# Patient Record
Sex: MALE | Race: WHITE | NOT HISPANIC OR LATINO | Employment: OTHER | URBAN - METROPOLITAN AREA
[De-identification: names, ages, dates, MRNs, and addresses within clinical notes are randomized per-mention and may not be internally consistent; named-entity substitution may affect disease eponyms.]

---

## 2017-01-09 ENCOUNTER — RESULTS ENCOUNTER (OUTPATIENT)
Dept: FAMILY MEDICINE CLINIC | Facility: CLINIC | Age: 67
End: 2017-01-09

## 2017-01-09 DIAGNOSIS — J30.2 SEASONAL ALLERGIES: ICD-10-CM

## 2017-01-09 DIAGNOSIS — R73.9 ELEVATED BLOOD SUGAR: ICD-10-CM

## 2017-01-09 DIAGNOSIS — E03.9 HYPOTHYROIDISM: ICD-10-CM

## 2017-01-09 DIAGNOSIS — F41.1 GAD (GENERALIZED ANXIETY DISORDER): ICD-10-CM

## 2017-01-09 DIAGNOSIS — Z87.438 HISTORY OF BPH: ICD-10-CM

## 2017-02-08 ENCOUNTER — OFFICE VISIT (OUTPATIENT)
Dept: FAMILY MEDICINE CLINIC | Facility: CLINIC | Age: 67
End: 2017-02-08

## 2017-02-08 VITALS
RESPIRATION RATE: 16 BRPM | BODY MASS INDEX: 38.37 KG/M2 | SYSTOLIC BLOOD PRESSURE: 126 MMHG | OXYGEN SATURATION: 96 % | HEART RATE: 86 BPM | HEIGHT: 74 IN | WEIGHT: 299 LBS | DIASTOLIC BLOOD PRESSURE: 84 MMHG | TEMPERATURE: 95.2 F

## 2017-02-08 DIAGNOSIS — H93.8X3 EAR PRESSURE, BILATERAL: Primary | ICD-10-CM

## 2017-02-08 DIAGNOSIS — H65.03 BILATERAL ACUTE SEROUS OTITIS MEDIA, RECURRENCE NOT SPECIFIED: ICD-10-CM

## 2017-02-08 PROCEDURE — 99213 OFFICE O/P EST LOW 20 MIN: CPT | Performed by: FAMILY MEDICINE

## 2017-02-08 RX ORDER — AMOXICILLIN 500 MG/1
CAPSULE ORAL
COMMUNITY
Start: 2017-01-10 | End: 2017-02-08

## 2017-02-08 RX ORDER — AMOXICILLIN AND CLAVULANATE POTASSIUM 875; 125 MG/1; MG/1
1 TABLET, FILM COATED ORAL 2 TIMES DAILY
Qty: 20 TABLET | Refills: 0 | Status: SHIPPED | OUTPATIENT
Start: 2017-02-08 | End: 2017-02-21

## 2017-02-08 RX ORDER — FINASTERIDE 5 MG/1
5 TABLET, FILM COATED ORAL DAILY
COMMUNITY
Start: 2017-01-27

## 2017-02-08 NOTE — PROGRESS NOTES
Subjective   Tony Max is a 66 y.o. male.     History of Present Illness   Chief Complaint:   Chief Complaint   Patient presents with   • ear pressure       Tony Max 66 y.o. male who presents today for ear pressure that has been present for 1 month and has not resolved since his last appontment.  The patient is currently taking no OTC medications for this issue. HIs blood pressure is elevated today: 132/89 and 156/100. He has so both ears.  he has a history of   Patient Active Problem List   Diagnosis   • JAIDA (generalized anxiety disorder)   • Atrial fibrillation   • Depression   • GERD (gastroesophageal reflux disease)   • Heart attack   • HLD (hyperlipidemia)   • Benign essential hypertension   • Hypothyroidism, acquired   • Sleep apnea   • Hypothyroidism   • History of BPH   • Elevated blood sugar   • Seasonal allergies   .  Since the last visit, he has overall felt well.  he has been compliant with   Current Outpatient Prescriptions:   •  amoxicillin (AMOXIL) 500 MG capsule, , Disp: , Rfl:   •  apixaban (ELIQUIS) 5 MG tablet tablet, Take 1 tablet by mouth 2 (Two) Times a Day., Disp: 60 tablet, Rfl:   •  aspirin 81 MG chewable tablet, Chew 1 tablet Daily., Disp: , Rfl:   •  citalopram (CeleXA) 40 MG tablet, Take 40 mg by mouth Daily., Disp: , Rfl:   •  finasteride (PROSCAR) 5 MG tablet, , Disp: , Rfl:   •  fluticasone (FLONASE) 50 MCG/ACT nasal spray, Put 2 sprays into each nostril once daily., Disp: , Rfl:   •  HYDROcodone-acetaminophen (NORCO) 5-325 MG per tablet, Take 1 tablet by mouth Every 6 (Six) Hours As Needed for moderate pain (4-6) (Pain)., Disp: 20 tablet, Rfl: 0  •  levoFLOXacin (LEVAQUIN) 500 MG tablet, Take 1 tablet by mouth Daily. One PO daily for infection, Disp: 10 tablet, Rfl: 0  •  levothyroxine (SYNTHROID, LEVOTHROID) 200 MCG tablet, Take 200 mcg by mouth Daily., Disp: , Rfl:   •  LORazepam (ATIVAN) 0.5 MG tablet, Take 0.5 mg by mouth Every 8 (Eight) Hours As Needed for anxiety.,  "Disp: , Rfl:   •  metoprolol succinate XL (TOPROL-XL) 25 MG 24 hr tablet, Take 25 mg by mouth Daily., Disp: , Rfl:   •  nitroglycerin (NITROSTAT) 0.4 MG SL tablet, Place 0.4 mg under the tongue Every 5 (Five) Minutes As Needed for chest pain. Take no more than 3 doses in 15 minutes., Disp: , Rfl:   •  phenazopyridine (PYRIDIUM) 200 MG tablet, Take 1 tablet by mouth 3 (Three) Times a Day As Needed for bladder spasms., Disp: 30 tablet, Rfl: 1  •  simvastatin (ZOCOR) 20 MG tablet, Take 20 mg by mouth Every Night., Disp: , Rfl:   •  tamsulosin (FLOMAX) 0.4 MG capsule 24 hr capsule, Take 0.4 mg by mouth Every Night., Disp: , Rfl: .  he denies medication side effects.    All of the chronic condition(s) listed above are stable w/o issues.    Visit Vitals   • /100   • Pulse 86   • Temp 95.2 °F (35.1 °C) (Oral)   • Resp 16   • Ht 74\" (188 cm)   • Wt 299 lb (136 kg)   • SpO2 96%   • BMI 38.39 kg/m2     The following portions of the patient's history were reviewed and updated as appropriate: allergies, current medications, past family history, past medical history, past social history, past surgical history and problem list.    Review of Systems   Constitutional: Negative for activity change, appetite change and unexpected weight change.   HENT:        Ear pressure-feels like fluid   Eyes: Negative for visual disturbance.   Respiratory: Negative for chest tightness and shortness of breath.    Cardiovascular: Negative for chest pain and palpitations.   Skin: Negative for color change.   Neurological: Negative for syncope and speech difficulty.   Psychiatric/Behavioral: Negative for confusion and decreased concentration.       Objective   Physical Exam   Constitutional: He is oriented to person, place, and time. He appears well-developed and well-nourished.   HENT:   Head: Normocephalic.   Right Ear: External ear normal.   Left Ear: External ear normal.   SO BOTH EARS   Eyes: EOM are normal. Pupils are equal, round, and " reactive to light.   Cardiovascular: Normal rate and regular rhythm.    Pulmonary/Chest: Effort normal and breath sounds normal.   Neurological: He is alert and oriented to person, place, and time.   Psychiatric: He has a normal mood and affect. His behavior is normal.   Nursing note and vitals reviewed.      Assessment/Plan   Tony was seen today for ear pressure.    Diagnoses and all orders for this visit:    Ear pressure, bilateral    Bilateral acute serous otitis media, recurrence not specified    Other orders  -     amoxicillin-clavulanate (AUGMENTIN) 875-125 MG per tablet; Take 1 tablet by mouth 2 (Two) Times a Day.

## 2017-02-08 NOTE — PATIENT INSTRUCTIONS
Rest  Increase fluids   RTC prn or 1 week if not better   Take mucinex DM  Take Zyrtec or Claritin   OTC cough med prn      Ibuprofen for pain and fever control                                                        Tylenol(acetominophen) for pain and fever control    Saline nasal spray 2 sprays to each nostril 6 times daily   FLONASE, MUCINEX, ZYRTEC AND VALSALVA MANUVER

## 2017-02-10 ENCOUNTER — TELEPHONE (OUTPATIENT)
Dept: FAMILY MEDICINE CLINIC | Facility: CLINIC | Age: 67
End: 2017-02-10

## 2017-02-10 RX ORDER — LEVOFLOXACIN 500 MG/1
500 TABLET, FILM COATED ORAL DAILY
Qty: 10 TABLET | Refills: 0 | Status: SHIPPED | OUTPATIENT
Start: 2017-02-10 | End: 2017-02-21

## 2017-02-11 DIAGNOSIS — Z87.438 HISTORY OF BPH: ICD-10-CM

## 2017-02-13 RX ORDER — TAMSULOSIN HYDROCHLORIDE 0.4 MG/1
CAPSULE ORAL
Qty: 90 CAPSULE | Refills: 0 | OUTPATIENT
Start: 2017-02-13

## 2017-02-16 RX ORDER — TAMSULOSIN HYDROCHLORIDE 0.4 MG/1
0.4 CAPSULE ORAL NIGHTLY
Qty: 30 CAPSULE | Refills: 0 | Status: SHIPPED | OUTPATIENT
Start: 2017-02-16 | End: 2017-02-21 | Stop reason: SDUPTHER

## 2017-02-21 ENCOUNTER — OFFICE VISIT (OUTPATIENT)
Dept: FAMILY MEDICINE CLINIC | Facility: CLINIC | Age: 67
End: 2017-02-21

## 2017-02-21 VITALS
RESPIRATION RATE: 16 BRPM | TEMPERATURE: 97.4 F | SYSTOLIC BLOOD PRESSURE: 120 MMHG | WEIGHT: 293 LBS | OXYGEN SATURATION: 93 % | HEART RATE: 84 BPM | HEIGHT: 74 IN | BODY MASS INDEX: 37.6 KG/M2 | DIASTOLIC BLOOD PRESSURE: 76 MMHG

## 2017-02-21 DIAGNOSIS — H65.03 BILATERAL ACUTE SEROUS OTITIS MEDIA, RECURRENCE NOT SPECIFIED: ICD-10-CM

## 2017-02-21 DIAGNOSIS — E03.9 HYPOTHYROIDISM, UNSPECIFIED TYPE: Primary | ICD-10-CM

## 2017-02-21 DIAGNOSIS — H93.8X1 EAR PRESSURE, RIGHT: ICD-10-CM

## 2017-02-21 DIAGNOSIS — Z87.438 HISTORY OF BPH: ICD-10-CM

## 2017-02-21 PROCEDURE — 99214 OFFICE O/P EST MOD 30 MIN: CPT | Performed by: FAMILY MEDICINE

## 2017-02-21 RX ORDER — MONTELUKAST SODIUM 10 MG/1
10 TABLET ORAL NIGHTLY
Qty: 30 TABLET | Refills: 0 | Status: SHIPPED | OUTPATIENT
Start: 2017-02-21 | End: 2017-03-22 | Stop reason: SDUPTHER

## 2017-02-21 RX ORDER — LEVOTHYROXINE SODIUM 0.2 MG/1
200 TABLET ORAL DAILY
Qty: 90 TABLET | Refills: 1 | Status: SHIPPED | OUTPATIENT
Start: 2017-02-21 | End: 2017-09-06 | Stop reason: SDUPTHER

## 2017-02-21 RX ORDER — TAMSULOSIN HYDROCHLORIDE 0.4 MG/1
0.4 CAPSULE ORAL NIGHTLY
Qty: 90 CAPSULE | Refills: 1 | Status: SHIPPED | OUTPATIENT
Start: 2017-02-21 | End: 2017-09-19 | Stop reason: SDUPTHER

## 2017-02-21 RX ORDER — AZITHROMYCIN 250 MG/1
TABLET, FILM COATED ORAL
Qty: 6 TABLET | Refills: 0 | Status: SHIPPED | OUTPATIENT
Start: 2017-02-21 | End: 2017-03-22

## 2017-02-21 RX ORDER — METHYLPREDNISOLONE 4 MG/1
TABLET ORAL
Qty: 21 TABLET | Refills: 0 | Status: SHIPPED | OUTPATIENT
Start: 2017-02-21 | End: 2017-03-22

## 2017-02-21 NOTE — PROGRESS NOTES
Subjective   Tony Max is a 66 y.o. male.     History of Present Illness   Chief Complaint:   Chief Complaint   Patient presents with   • Ear pressure     follow up   • Hypothyroidism   • Benign Prostatic Hypertrophy       Tony Max 66 y.o. male who presents today for Medical Management of the below listed issues and medication refills. He stated that the ear pressure has resolved in the left ear but still remains in the right ear. He has finished the antibiotic.  he has a history of   Patient Active Problem List   Diagnosis   • JAIDA (generalized anxiety disorder)   • Atrial fibrillation   • Depression   • GERD (gastroesophageal reflux disease)   • Heart attack   • HLD (hyperlipidemia)   • Benign essential hypertension   • Hypothyroidism, acquired   • Sleep apnea   • Hypothyroidism   • History of BPH   • Elevated blood sugar   • Seasonal allergies   .  Since the last visit, he has overall felt well.  he has been compliant with   Current Outpatient Prescriptions:   •  apixaban (ELIQUIS) 5 MG tablet tablet, Take 1 tablet by mouth 2 (Two) Times a Day., Disp: 60 tablet, Rfl:   •  aspirin 81 MG chewable tablet, Chew 1 tablet Daily., Disp: , Rfl:   •  citalopram (CeleXA) 40 MG tablet, Take 40 mg by mouth Daily., Disp: , Rfl:   •  finasteride (PROSCAR) 5 MG tablet, , Disp: , Rfl:   •  fluticasone (FLONASE) 50 MCG/ACT nasal spray, Put 2 sprays into each nostril once daily., Disp: , Rfl:   •  levothyroxine (SYNTHROID, LEVOTHROID) 200 MCG tablet, Take 200 mcg by mouth Daily., Disp: , Rfl:   •  LORazepam (ATIVAN) 0.5 MG tablet, Take 0.5 mg by mouth Every 8 (Eight) Hours As Needed for anxiety., Disp: , Rfl:   •  metoprolol succinate XL (TOPROL-XL) 25 MG 24 hr tablet, Take 25 mg by mouth Daily., Disp: , Rfl:   •  nitroglycerin (NITROSTAT) 0.4 MG SL tablet, Place 0.4 mg under the tongue Every 5 (Five) Minutes As Needed for chest pain. Take no more than 3 doses in 15 minutes., Disp: , Rfl:   •  simvastatin (ZOCOR) 20 MG  "tablet, Take 20 mg by mouth Every Night., Disp: , Rfl:   •  tamsulosin (FLOMAX) 0.4 MG capsule 24 hr capsule, Take 1 capsule by mouth Every Night., Disp: 30 capsule, Rfl: 0.  he denies medication side effects.    All of the chronic condition(s) listed above are stable w/o issues.    Visit Vitals   • /76   • Pulse 84   • Temp 97.4 °F (36.3 °C) (Oral)   • Resp 16   • Ht 74\" (188 cm)   • Wt 293 lb (133 kg)   • SpO2 93%   • BMI 37.62 kg/m2       Results for orders placed or performed during the hospital encounter of 11/21/16   Basic Metabolic Panel   Result Value Ref Range    Glucose 122 (H) 65 - 99 mg/dL    BUN 21 8 - 23 mg/dL    Creatinine 1.08 0.76 - 1.27 mg/dL    Sodium 143 136 - 145 mmol/L    Potassium 3.9 3.5 - 5.2 mmol/L    Chloride 107 98 - 107 mmol/L    CO2 22.5 22.0 - 29.0 mmol/L    Calcium 8.2 (L) 8.6 - 10.5 mg/dL    eGFR Non African Amer 68 >60 mL/min/1.73    BUN/Creatinine Ratio 19.4 7.0 - 25.0    Anion Gap 13.5 mmol/L   CBC Auto Differential   Result Value Ref Range    WBC 8.00 4.50 - 10.70 10*3/mm3    RBC 4.55 (L) 4.60 - 6.00 10*6/mm3    Hemoglobin 15.7 13.7 - 17.6 g/dL    Hematocrit 45.0 40.4 - 52.2 %    MCV 98.9 (H) 79.8 - 96.2 fL    MCH 34.5 (H) 27.0 - 32.7 pg    MCHC 34.9 32.6 - 36.4 g/dL    RDW 13.8 11.5 - 14.5 %    RDW-SD 49.6 37.0 - 54.0 fl    MPV 11.5 6.0 - 12.0 fL    Platelets 143 140 - 500 10*3/mm3    Neutrophil % 52.2 42.7 - 76.0 %    Lymphocyte % 35.4 19.6 - 45.3 %    Monocyte % 10.1 5.0 - 12.0 %    Eosinophil % 2.3 0.3 - 6.2 %    Basophil % 0.0 0.0 - 1.5 %    Immature Grans % 0.0 0.0 - 0.5 %    Neutrophils, Absolute 4.18 1.90 - 8.10 10*3/mm3    Lymphocytes, Absolute 2.83 0.90 - 4.80 10*3/mm3    Monocytes, Absolute 0.81 0.20 - 1.20 10*3/mm3    Eosinophils, Absolute 0.18 0.00 - 0.70 10*3/mm3    Basophils, Absolute 0.00 0.00 - 0.20 10*3/mm3    Immature Grans, Absolute 0.00 0.00 - 0.03 10*3/mm3   Tissue Exam   Result Value Ref Range    Case Report       Surgical Pathology Report             " "            Case: [id number]                                  Authorizing Provider:  Piotr GONCALVES              Collected:           11/21/2016 09:41 AM                                 MD Bakari                                                             Ordering Location:     [facility]  Received:            11/21/2016 10:48 AM                                 MAIN OR                                                                      Pathologist:           Ayaz Max MD                                                          Specimen:    Urinary Bladder, BLADDER TUMORS                                                            Final Diagnosis       URINARY BLADDER, TRANSURETHRAL RESECTION OF TUMOR (TURBT):         FRAGMENTS OF LOW-GRADE PAPILLARY UROTHELIAL CARCINOMA.         NO INVASION IS IDENTIFIED.          MUSCULARIS PROPRIA IS PRESENT IN THIS SAMPLE, BUT SHOWS NO EVIDENCE OF INVOLVEMENT BY                 TUMOR.          SEE SYNOPTIC REPORT (BELOW) FOR ADDITIONAL DETAILS.     SYNOPTIC REPORT (Based on [facility] Cancer Case Summary, version October 2013):       Procedure: TURBT.       Tumor type: Non-invasive carcinoma.        Histologic type: Urothelial (transitional cell) carcinoma.        Associated epithelial lesion: None identified.        Histologic grade: Low-grade urothelial carcinoma.        Tumor configuration: Papillary.        Adequacy of material for determining muscularis propria invasion: Muscularis propria (detrusor muscle) present,            uninvolved by tumor.        Lymph-vascular invasion: Not identified.        Microscopic tumor extension: Non-invasive papillary carcinoma.        Additional pathologic findings: Cautery artifact.     TDJ/sunni IHC/a/THM    CPT CODE: 36581      Gross Description       Received in formalin labeled \"urinary bladder, bladder tumors\" is a 1.5 x 1.3 x 0.6 cm aggregate of pink-tan friable pieces of tissue. Submitted all in block " 1A.    CC/USO/TDJ/hmf        Microscopic Description       Performed, incorporated in diagnosis.        Embedded Images           The following portions of the patient's history were reviewed and updated as appropriate: allergies, current medications, past family history, past medical history, past social history, past surgical history and problem list.    Review of Systems   Constitutional: Negative for activity change, appetite change and unexpected weight change.   HENT:        Right sided ear pressure   Eyes: Negative for visual disturbance.   Respiratory: Negative for chest tightness and shortness of breath.    Cardiovascular: Negative for chest pain and palpitations.   Skin: Negative for color change.   Neurological: Negative for syncope and speech difficulty.   Psychiatric/Behavioral: Negative for confusion and decreased concentration.       Objective   Physical Exam   Constitutional: He is oriented to person, place, and time. He appears well-developed and well-nourished.   HENT:   Head: Normocephalic and atraumatic.    Right s.o.   Eyes: EOM are normal. Pupils are equal, round, and reactive to light.   Neck: Normal range of motion. Neck supple.   Cardiovascular: Normal rate and regular rhythm.    Pulmonary/Chest: Effort normal and breath sounds normal.   Abdominal: Soft.   Neurological: He is alert and oriented to person, place, and time.   Psychiatric: He has a normal mood and affect. His behavior is normal.   Nursing note and vitals reviewed.      Assessment/Plan   Tony was seen today for ear pressure, hypothyroidism and benign prostatic hypertrophy.    Diagnoses and all orders for this visit:    Hypothyroidism, unspecified type  -     levothyroxine (SYNTHROID, LEVOTHROID) 200 MCG tablet; Take 1 tablet by mouth Daily.    History of BPH  -     tamsulosin (FLOMAX) 0.4 MG capsule 24 hr capsule; Take 1 capsule by mouth Every Night.    Ear pressure, right    Bilateral acute serous otitis media, recurrence not  specified    Other orders  -     azithromycin (ZITHROMAX Z-KATRIN) 250 MG tablet; Take 2 tablets the first day, then 1 tablet daily for 4 days.  -     montelukast (SINGULAIR) 10 MG tablet; Take 1 tablet by mouth Every Night.  -     MethylPREDNISolone (MEDROL, KATRIN,) 4 MG tablet; Take as directed on package instructions.

## 2017-02-21 NOTE — PATIENT INSTRUCTIONS
Exercise 30 minutes most days of the week  Sleep 6-8 hours each night if possible  Low fat, low cholesterol diet   we discussed prescribed medications and how to take them   make sure you get results of any labs/studies ordered today  Low glycemic index diet  Do estefani gupta

## 2017-03-22 ENCOUNTER — OFFICE VISIT (OUTPATIENT)
Dept: FAMILY MEDICINE CLINIC | Facility: CLINIC | Age: 67
End: 2017-03-22

## 2017-03-22 VITALS
TEMPERATURE: 97.4 F | SYSTOLIC BLOOD PRESSURE: 126 MMHG | HEART RATE: 91 BPM | WEIGHT: 296 LBS | OXYGEN SATURATION: 96 % | HEIGHT: 74 IN | DIASTOLIC BLOOD PRESSURE: 80 MMHG | BODY MASS INDEX: 37.99 KG/M2 | RESPIRATION RATE: 16 BRPM

## 2017-03-22 DIAGNOSIS — J30.9 ALLERGIC RHINITIS, UNSPECIFIED ALLERGIC RHINITIS TRIGGER, UNSPECIFIED RHINITIS SEASONALITY: Primary | ICD-10-CM

## 2017-03-22 DIAGNOSIS — F41.1 GAD (GENERALIZED ANXIETY DISORDER): ICD-10-CM

## 2017-03-22 PROCEDURE — 99213 OFFICE O/P EST LOW 20 MIN: CPT | Performed by: FAMILY MEDICINE

## 2017-03-22 RX ORDER — LORAZEPAM 0.5 MG/1
0.5 TABLET ORAL EVERY 8 HOURS PRN
Qty: 30 TABLET | Refills: 2 | Status: SHIPPED | OUTPATIENT
Start: 2017-03-22 | End: 2017-09-19 | Stop reason: SDUPTHER

## 2017-03-22 RX ORDER — MONTELUKAST SODIUM 10 MG/1
10 TABLET ORAL NIGHTLY
Qty: 90 TABLET | Refills: 1 | Status: SHIPPED | OUTPATIENT
Start: 2017-03-22 | End: 2017-09-19 | Stop reason: SDUPTHER

## 2017-03-22 NOTE — PROGRESS NOTES
Subjective   Tony Max is a 66 y.o. male.     History of Present Illness   Chief Complaint:   Chief Complaint   Patient presents with   • Anxiety   • Allergic Rhinitis       Tony Max 66 y.o. male who presents today for Medical Management of the below listed issues and medication refills. The patient has read and signed the [facility] Controlled Substance Contract.  I will continue to see patient for regular follow up appointments.  They are well controlled on their medication.  FABIAN has been reviewed by me and is updated every 3 months. The patient is aware of the potential for addiction and dependence.    he has a history of   Patient Active Problem List   Diagnosis   • JAIDA (generalized anxiety disorder)   • Atrial fibrillation   • Depression   • GERD (gastroesophageal reflux disease)   • Heart attack   • HLD (hyperlipidemia)   • Benign essential hypertension   • Hypothyroidism, acquired   • Sleep apnea   • Hypothyroidism   • History of BPH   • Elevated blood sugar   • Seasonal allergies   .  Since the last visit, he has overall felt well.  he has been compliant with   Current Outpatient Prescriptions:   •  apixaban (ELIQUIS) 5 MG tablet tablet, Take 1 tablet by mouth 2 (Two) Times a Day., Disp: 60 tablet, Rfl:   •  aspirin 81 MG chewable tablet, Chew 1 tablet Daily., Disp: , Rfl:   •  citalopram (CeleXA) 40 MG tablet, Take 40 mg by mouth Daily., Disp: , Rfl:   •  finasteride (PROSCAR) 5 MG tablet, , Disp: , Rfl:   •  fluticasone (FLONASE) 50 MCG/ACT nasal spray, Put 2 sprays into each nostril once daily., Disp: , Rfl:   •  levothyroxine (SYNTHROID, LEVOTHROID) 200 MCG tablet, Take 1 tablet by mouth Daily., Disp: 90 tablet, Rfl: 1  •  LORazepam (ATIVAN) 0.5 MG tablet, Take 0.5 mg by mouth Every 8 (Eight) Hours As Needed for anxiety., Disp: , Rfl:   •  metoprolol succinate XL (TOPROL-XL) 25 MG 24 hr tablet, Take 25 mg by mouth Daily., Disp: , Rfl:   •  montelukast (SINGULAIR) 10 MG tablet, Take 1  "tablet by mouth Every Night., Disp: 30 tablet, Rfl: 0  •  nitroglycerin (NITROSTAT) 0.4 MG SL tablet, Place 0.4 mg under the tongue Every 5 (Five) Minutes As Needed for chest pain. Take no more than 3 doses in 15 minutes., Disp: , Rfl:   •  simvastatin (ZOCOR) 20 MG tablet, Take 20 mg by mouth Every Night., Disp: , Rfl:   •  tamsulosin (FLOMAX) 0.4 MG capsule 24 hr capsule, Take 1 capsule by mouth Every Night., Disp: 90 capsule, Rfl: 1.  he denies medication side effects.    All of the chronic condition(s) listed above are stable w/o issues.    /80  Pulse 91  Temp 97.4 °F (36.3 °C) (Oral)   Resp 16  Ht 74\" (188 cm)  Wt 296 lb (134 kg)  SpO2 96%  BMI 38 kg/m2    The following portions of the patient's history were reviewed and updated as appropriate: allergies, current medications, past family history, past medical history, past social history, past surgical history and problem list.    Review of Systems   Constitutional: Negative for activity change, appetite change and unexpected weight change.   Eyes: Negative for visual disturbance.   Respiratory: Negative for chest tightness and shortness of breath.    Cardiovascular: Negative for chest pain and palpitations.   Skin: Negative for color change.   Neurological: Negative for syncope and speech difficulty.   Psychiatric/Behavioral: Negative for confusion and decreased concentration.       Objective   Physical Exam   Constitutional: He is oriented to person, place, and time. He appears well-developed and well-nourished.   HENT:   Head: Normocephalic and atraumatic.   Eyes: EOM are normal. Pupils are equal, round, and reactive to light.   Neck: Normal range of motion. Neck supple.   Cardiovascular: Normal rate and regular rhythm.    Pulmonary/Chest: Effort normal and breath sounds normal.   Abdominal: Soft. Bowel sounds are normal.   Musculoskeletal: Normal range of motion.   Neurological: He is alert and oriented to person, place, and time. He has normal " reflexes.   Skin: No rash noted.   Psychiatric: He has a normal mood and affect. His behavior is normal. Thought content normal.   Nursing note and vitals reviewed.      Assessment/Plan   Tony was seen today for anxiety and allergic rhinitis.    Diagnoses and all orders for this visit:    Allergic rhinitis, unspecified allergic rhinitis trigger, unspecified rhinitis seasonality  -     montelukast (SINGULAIR) 10 MG tablet; Take 1 tablet by mouth Every Night.    JAIDA (generalized anxiety disorder)  -     LORazepam (ATIVAN) 0.5 MG tablet; Take 1 tablet by mouth Every 8 (Eight) Hours As Needed for Anxiety.

## 2017-03-22 NOTE — PATIENT INSTRUCTIONS
Exercise 30 minutes most days of the week  Sleep 6-8 hours each night if possible  Low fat, low cholesterol diet   we discussed prescribed medications and how to take them   make sure you get results of any labs/studies ordered today  Low glycemic index diet    Patients must be seen every 3 months for their presription medication review and refill required by [place] law.  Patient has been advised on the potential for addiction  and dependence to their prescribed scheduled medication.  FABIAN was obtained today and reviewed. This was scanned into the patient's chart.  Do labs  Already at lab

## 2017-04-17 ENCOUNTER — OFFICE VISIT (OUTPATIENT)
Dept: FAMILY MEDICINE CLINIC | Facility: CLINIC | Age: 67
End: 2017-04-17

## 2017-04-17 VITALS
TEMPERATURE: 97.9 F | RESPIRATION RATE: 16 BRPM | SYSTOLIC BLOOD PRESSURE: 120 MMHG | DIASTOLIC BLOOD PRESSURE: 74 MMHG | HEART RATE: 106 BPM | BODY MASS INDEX: 37.73 KG/M2 | OXYGEN SATURATION: 97 % | HEIGHT: 74 IN | WEIGHT: 294 LBS

## 2017-04-17 DIAGNOSIS — J06.9 ACUTE URI: ICD-10-CM

## 2017-04-17 DIAGNOSIS — R05.9 COUGH: ICD-10-CM

## 2017-04-17 DIAGNOSIS — G47.30 SLEEP APNEA IN ADULT: ICD-10-CM

## 2017-04-17 DIAGNOSIS — J32.0 MAXILLARY SINUSITIS, UNSPECIFIED CHRONICITY: Primary | ICD-10-CM

## 2017-04-17 PROCEDURE — 99214 OFFICE O/P EST MOD 30 MIN: CPT | Performed by: FAMILY MEDICINE

## 2017-04-17 RX ORDER — NITROFURANTOIN 25; 75 MG/1; MG/1
CAPSULE ORAL
COMMUNITY
Start: 2017-04-11 | End: 2017-04-17

## 2017-04-17 RX ORDER — LEVOFLOXACIN 500 MG/1
500 TABLET, FILM COATED ORAL DAILY
Qty: 10 TABLET | Refills: 0 | Status: SHIPPED | OUTPATIENT
Start: 2017-04-17 | End: 2017-04-27

## 2017-04-17 NOTE — PATIENT INSTRUCTIONS
Rest  Increase fluids   RTC prn or 1 week if not better   Take mucinex DM  Take Zyrtec or Claritin   OTC cough med prn      Ibuprofen for pain and fever control                                                        Tylenol(acetominophen) for pain and fever control    Saline nasal spray 2 sprays to each nostril 6 times daily

## 2017-04-17 NOTE — PROGRESS NOTES
Subjective   Tony Max is a 66 y.o. male.     History of Present Illness   Chief Complaint:   Chief Complaint   Patient presents with   • Sinusitis   • URI   • Cough       Tony Max 66 y.o. male who presents today for a sinus infection that has been present for 4 days. His symptoms include a cough, chest congestion, sinus pressure and an earache. He has taken OTC decongestant.  he has a history of   Patient Active Problem List   Diagnosis   • JAIDA (generalized anxiety disorder)   • Atrial fibrillation   • Depression   • GERD (gastroesophageal reflux disease)   • Heart attack   • HLD (hyperlipidemia)   • Benign essential hypertension   • Hypothyroidism, acquired   • Sleep apnea   • Hypothyroidism   • History of BPH   • Elevated blood sugar   • Seasonal allergies   .  Since the last visit, he has overall felt well.  he has been compliant with   Current Outpatient Prescriptions:   •  apixaban (ELIQUIS) 5 MG tablet tablet, Take 1 tablet by mouth 2 (Two) Times a Day., Disp: 60 tablet, Rfl:   •  aspirin 81 MG chewable tablet, Chew 1 tablet Daily., Disp: , Rfl:   •  citalopram (CeleXA) 40 MG tablet, Take 40 mg by mouth Daily., Disp: , Rfl:   •  finasteride (PROSCAR) 5 MG tablet, , Disp: , Rfl:   •  fluticasone (FLONASE) 50 MCG/ACT nasal spray, Put 2 sprays into each nostril once daily., Disp: , Rfl:   •  levothyroxine (SYNTHROID, LEVOTHROID) 200 MCG tablet, Take 1 tablet by mouth Daily., Disp: 90 tablet, Rfl: 1  •  LORazepam (ATIVAN) 0.5 MG tablet, Take 1 tablet by mouth Every 8 (Eight) Hours As Needed for Anxiety., Disp: 30 tablet, Rfl: 2  •  metoprolol succinate XL (TOPROL-XL) 25 MG 24 hr tablet, Take 25 mg by mouth Daily., Disp: , Rfl:   •  montelukast (SINGULAIR) 10 MG tablet, Take 1 tablet by mouth Every Night., Disp: 90 tablet, Rfl: 1  •  nitrofurantoin, macrocrystal-monohydrate, (MACROBID) 100 MG capsule, , Disp: , Rfl:   •  nitroglycerin (NITROSTAT) 0.4 MG SL tablet, Place 0.4 mg under the tongue Every 5  "(Five) Minutes As Needed for chest pain. Take no more than 3 doses in 15 minutes., Disp: , Rfl:   •  simvastatin (ZOCOR) 20 MG tablet, Take 20 mg by mouth Every Night., Disp: , Rfl:   •  tamsulosin (FLOMAX) 0.4 MG capsule 24 hr capsule, Take 1 capsule by mouth Every Night., Disp: 90 capsule, Rfl: 1.  he denies medication side effects.    All of the chronic condition(s) listed above are stable w/o issues.    /74  Pulse 106  Temp 97.9 °F (36.6 °C) (Oral)   Resp 16  Ht 74\" (188 cm)  Wt 294 lb (133 kg)  SpO2 97%  BMI 37.75 kg/m2    The following portions of the patient's history were reviewed and updated as appropriate: allergies, current medications, past family history, past medical history, past social history, past surgical history and problem list.    Review of Systems   Constitutional: Negative for activity change, appetite change and unexpected weight change.   HENT: Positive for congestion, ear pain and sinus pressure.    Eyes: Negative for visual disturbance.   Respiratory: Positive for cough. Negative for chest tightness and shortness of breath.    Cardiovascular: Negative for chest pain and palpitations.   Skin: Negative for color change.   Neurological: Negative for syncope and speech difficulty.   Psychiatric/Behavioral: Negative for confusion and decreased concentration.       Objective   Physical Exam   Constitutional: He is oriented to person, place, and time. He appears well-developed and well-nourished.   HENT:   Head: Normocephalic.   Right Ear: External ear normal.   Left Ear: External ear normal.   Nose: Nose normal.   Eyes: EOM are normal. Pupils are equal, round, and reactive to light.   Neck: Normal range of motion. Neck supple. No thyromegaly present.   Cardiovascular: Normal rate, regular rhythm, normal heart sounds and intact distal pulses.  Exam reveals no friction rub.    No murmur heard.  Pulmonary/Chest: Effort normal and breath sounds normal. No respiratory distress. He has " no wheezes. He has no rales.   Abdominal: Soft.   Musculoskeletal: Normal range of motion.   Lymphadenopathy:     He has no cervical adenopathy.   Neurological: He is alert and oriented to person, place, and time.   Skin: Skin is warm and dry.   Psychiatric: He has a normal mood and affect. His behavior is normal.   Nursing note and vitals reviewed.      Assessment/Plan   Tony was seen today for sinusitis, uri and cough.    Diagnoses and all orders for this visit:    Maxillary sinusitis, unspecified chronicity    Acute URI    Cough    Other orders  -     levoFLOXacin (LEVAQUIN) 500 MG tablet; Take 1 tablet by mouth Daily for 10 days. For Respiratory infection

## 2017-07-11 PROCEDURE — 81001 URINALYSIS AUTO W/SCOPE: CPT | Performed by: EMERGENCY MEDICINE

## 2017-07-11 PROCEDURE — 99283 EMERGENCY DEPT VISIT LOW MDM: CPT

## 2017-07-11 PROCEDURE — 87186 SC STD MICRODIL/AGAR DIL: CPT | Performed by: EMERGENCY MEDICINE

## 2017-07-11 PROCEDURE — 87086 URINE CULTURE/COLONY COUNT: CPT | Performed by: EMERGENCY MEDICINE

## 2017-07-12 ENCOUNTER — HOSPITAL ENCOUNTER (EMERGENCY)
Facility: HOSPITAL | Age: 67
Discharge: HOME | End: 2017-07-12
Attending: EMERGENCY MEDICINE | Admitting: EMERGENCY MEDICINE

## 2017-07-12 VITALS
HEIGHT: 74 IN | RESPIRATION RATE: 18 BRPM | SYSTOLIC BLOOD PRESSURE: 121 MMHG | DIASTOLIC BLOOD PRESSURE: 77 MMHG | WEIGHT: 290 LBS | HEART RATE: 112 BPM | TEMPERATURE: 99.5 F | OXYGEN SATURATION: 96 % | BODY MASS INDEX: 37.22 KG/M2

## 2017-07-12 DIAGNOSIS — N39.0 URINARY TRACT INFECTION, SITE UNSPECIFIED: Primary | ICD-10-CM

## 2017-07-12 LAB

## 2017-07-12 NOTE — ED PROVIDER NOTES
EMERGENCY DEPARTMENT ENCOUNTER    CHIEF COMPLAINT  Chief Complaint: myalgias   History given by: patient   History limited by: nothing   Room Number: 17/17  PMD: Lane Altamirano MD      HPI:  Pt is a 66 y.o. male who presents complaining of generalized myalgias onset yesterday. Pt also complains of mild dysuria, increased urinary frequency, fever (101.8 at home), HA, nausea, and urine that is dark in color. Pt denies chest pain, SOA, diarrhea, blood in stool, and any other sx at this time. Pt was seen at [facility] yesterday and diagnosed with a UTI; he was sent here for further evaluation. He had a bladder scope done on 06/20/17. Pt was started on Augmentin after scope, and he completed the course of abx roughly 10 days ago. He gets scopes every 3 months due to h/o bladder cancer. Pt takes Eliquis and aspirin QD.     Onset: yesterday  Timing: constant   Location: generalized   Radiation: does not radiate   Quality: new   Intensity/Severity: moderate   Progression: unchanged   Associated Symptoms: dysuria, frequency, fever, HA, nausea, dark urine   Aggravating Factors: none specified  Alleviating Factors: none specified   Previous Episodes: Pt has gotten UTIs in the past.   Treatment before arrival: Pt completed a course of Augmentin 10 years day.     PAST MEDICAL HISTORY  Active Ambulatory Problems     Diagnosis Date Noted   • JAIDA (generalized anxiety disorder)    • Atrial fibrillation    • Depression    • GERD (gastroesophageal reflux disease)    • Heart attack    • HLD (hyperlipidemia)    • Benign essential hypertension    • Hypothyroidism, acquired    • Sleep apnea    • Hypothyroidism 12/28/2015   • History of BPH 12/28/2015   • Elevated blood sugar 08/09/2016   • Seasonal allergies 08/09/2016     Resolved Ambulatory Problems     Diagnosis Date Noted   • No Resolved Ambulatory Problems     Past Medical History:   Diagnosis Date   • Acute sinus infection    • Atrial fibrillation    • Benign essential hypertension    •  Bladder tumor    • Depression    • JAIDA (generalized anxiety disorder)    • GERD (gastroesophageal reflux disease)    • Heart attack    • HLD (hyperlipidemia)    • Hypothyroidism, acquired    • Screening for glaucoma 2007   • Sleep apnea        PAST SURGICAL HISTORY  Past Surgical History:   Procedure Laterality Date   • APPENDECTOMY  1963   • CHOLECYSTECTOMY     • COLONOSCOPY  2006    negative for cancer; polyps   • CORONARY ANGIOPLASTY WITH STENT PLACEMENT  05/14/2011   • KNEE SURGERY  1975   • TRANSURETHRAL RESECTION OF BLADDER TUMOR N/A 11/21/2016    Procedure: CYSTOSCOPY TRANSURETHRAL RESECTION OF BLADDER TUMOR;  Surgeon: Piotr Villegas MD;  Location: [facility] OR;  Service:        FAMILY HISTORY  Family History   Problem Relation Age of Onset   • Anxiety disorder Mother    • Depression Mother    • Diabetes Mother    • Heart disease Mother    • Hypertension Mother    • Depression Sister    • Thyroid disease Sister    • Cancer Other      breast; lung; prostate   • Heart disease Other      uncle       SOCIAL HISTORY  Social History     Social History   • Marital status:      Spouse name: N/A   • Number of children: N/A   • Years of education: N/A     Occupational History   • Not on file.     Social History Main Topics   • Smoking status: Current Every Day Smoker     Packs/day: 1.00     Years: 40.00     Types: Cigarettes   • Smokeless tobacco: Never Used   • Alcohol use No   • Drug use: No   • Sexual activity: Defer     Other Topics Concern   • Not on file     Social History Narrative       ALLERGIES  Keflex [cephalexin]; Buspar [buspirone]; Pristiq [desvenlafaxine]; Prozac [fluoxetine]; Viibryd [vilazodone hcl]; and Zoloft [sertraline]    REVIEW OF SYSTEMS  Review of Systems   Constitutional: Positive for fever (101.8).   Respiratory: Negative.  Negative for shortness of breath.    Cardiovascular: Negative.  Negative for chest pain.   Gastrointestinal: Positive for nausea. Negative for blood  in stool and diarrhea.   Genitourinary: Positive for dysuria and frequency.        Dark colored urine    Musculoskeletal: Positive for myalgias.   Neurological: Positive for headaches.   All other systems reviewed and are negative.      PHYSICAL EXAM  ED Triage Vitals   Temp Heart Rate Resp BP SpO2   07/11/17 2109 07/11/17 2109 07/11/17 2109 07/11/17 2109 07/11/17 2109   99.3 °F (37.4 °C) 119 18 138/94 97 %      Temp src Heart Rate Source Patient Position BP Location FiO2 (%)   07/11/17 2109 07/11/17 2109 07/11/17 2109 07/11/17 2109 --   Tympanic Monitor Standing Right arm        Physical Exam   Constitutional: He is oriented to person, place, and time and well-developed, well-nourished, and in no distress.   HENT:   Head: Normocephalic and atraumatic.   Eyes: EOM are normal. Pupils are equal, round, and reactive to light.   Neck: Normal range of motion. Neck supple.   Cardiovascular: Normal rate and normal heart sounds.  An irregularly irregular rhythm present.   Pulmonary/Chest: Effort normal. No respiratory distress. He has decreased breath sounds in the right lower field and the left lower field.   Abdominal: Soft. There is no tenderness. There is no rebound, no guarding and no CVA tenderness.   Musculoskeletal: Normal range of motion. He exhibits no edema.   Neurological: He is alert and oriented to person, place, and time. He has normal sensation and normal strength.   Skin: Skin is warm and dry.   Psychiatric: Mood and affect normal.   Nursing note and vitals reviewed.      LAB RESULTS  Lab Results (last 24 hours)     Procedure Component Value Units Date/Time    POCT Urinalysis (automated dipstick) [[id number]]  (Abnormal) Collected:  07/11/17 1956    Specimen:  Urine Updated:  07/11/17 1957     Color Dark Yellow     Clarity, UA Cloudy (A)     Glucose, UA Negative mg/dL      Bilirubin Negative     Ketones, UA Trace (A)     Specific Gravity  1.030     Blood, UA Moderate (A)     pH, Urine 6.0     Protein,   mg/dL (A) mg/dL      Urobilinogen, UA Normal     Leukocytes Trace (A)     Nitrite, UA Positive (A)    Urinalysis With / Culture If Indicated [[id number]]  (Abnormal) Collected:  07/11/17 2338    Specimen:  Urine from Urine, Clean Catch Updated:  07/12/17 0012     Color, UA Dark Yellow (A)     Appearance, UA Cloudy (A)     pH, UA 6.0     Specific Gravity, UA >=1.030     Glucose, UA Negative     Ketones, UA Trace (A)     Bilirubin, UA Negative     Blood, UA Moderate (2+) (A)     Protein, UA 30 mg/dL (1+) (A)     Leuk Esterase, UA Moderate (2+) (A)     Nitrite, UA Positive (A)     Urobilinogen, UA 1.0 E.U./dL    Urinalysis, Microscopic Only [[id number]]  (Abnormal) Collected:  07/11/17 2338    Specimen:  Urine from Urine, Clean Catch Updated:  07/12/17 0012     RBC, UA 3-5 (A) /HPF      WBC, UA Too Numerous to Count (A) /HPF      Bacteria, UA 1+ (A) /HPF      Squamous Epithelial Cells, UA 0-2 /HPF      Hyaline Casts, UA 31-50 /LPF      Methodology Automated Microscopy    Urine Culture [[id number]] Collected:  07/11/17 2338    Specimen:  Urine from Urine, Clean Catch Updated:  07/11/17 2358          I ordered the above labs and reviewed the results.      PROCEDURES  Procedures      PROGRESS AND CONSULTS  ED Course     0101: Discussed diagnosis of UTI. I offered to draw further labs, but pt is comfortable being discharged with abx for UTI treatment. Urine culture results will be reviewed. Pt will f/u with urologist PRN. All questions were addressed.     MEDICAL DECISION MAKING  Results were reviewed/discussed with the patient and they were also made aware of online access. Pt also made aware that some labs, such as cultures, will not be resulted during ER visit and follow up with PMD is necessary.     MDM  Number of Diagnoses or Management Options  Urinary tract infection, site unspecified:      Amount and/or Complexity of Data Reviewed  Clinical lab tests: ordered and reviewed    Patient Progress  Patient progress: stable          DIAGNOSIS  Final diagnoses:   Urinary tract infection, site unspecified       DISPOSITION  DISCHARGE    Patient discharged in stable condition.    Reviewed implications of results, diagnosis, meds, responsibility to follow up, warning signs and symptoms of possible worsening, potential complications and reasons to return to ER.    Patient/Family voiced understanding of above instructions.    Discussed plan for discharge, as there is no emergent indication for admission.  Pt/family is agreeable and understands need for follow up and repeat testing.  Pt is aware that discharge does not mean that nothing is wrong but it indicates no emergency is present that requires admission and they must continue care with follow-up as given below or physician of their choice.     FOLLOW-UP  Piotr Villegas MD  [address] [place], [place] [id number]  [phone]          Lane Altamirano MD  [address] [address]  [phone]               Medication List      Notice     No changes were made to your prescriptions during this visit.        Latest Documented Vital Signs:  As of 1:05 AM  BP- 121/77 HR- 112 Temp- 99.5 °F (37.5 °C) O2 sat- 96%    --  Documentation assistance provided by portillo Silva for Tony Cortez.  Information recorded by the scribe was done at my direction and has been verified and validated by me.          Stacie Silva  07/12/17 0106       Tony Cortez MD  07/12/17 0109

## 2017-07-13 ENCOUNTER — TELEPHONE (OUTPATIENT)
Dept: SOCIAL WORK | Facility: HOSPITAL | Age: 67
End: 2017-07-13

## 2017-07-13 NOTE — TELEPHONE ENCOUNTER
ED f/u phone call: spoke w/ wife, who states that patient is feeling better. Advised to follow up w/ PCP re final culture results, as they are not back yet. Reminded to f/u w/ both PCP and urologist.

## 2017-07-14 LAB — BACTERIA SPEC AEROBE CULT: ABNORMAL

## 2017-07-14 RX ORDER — NITROFURANTOIN 25; 75 MG/1; MG/1
100 CAPSULE ORAL 2 TIMES DAILY
Qty: 20 CAPSULE | Refills: 0 | Status: SHIPPED | OUTPATIENT
Start: 2017-07-14 | End: 2017-09-19

## 2017-07-14 NOTE — TELEPHONE ENCOUNTER
Urine culture from ER show that bactrim will not cover his UTI. I talked with Mariah BALBUENA and changed his antibiotic. I let pt know

## 2017-08-29 ENCOUNTER — OFFICE VISIT (OUTPATIENT)
Dept: RETAIL CLINIC | Facility: CLINIC | Age: 67
End: 2017-08-29

## 2017-08-29 VITALS — HEART RATE: 90 BPM | TEMPERATURE: 98.1 F | OXYGEN SATURATION: 98 %

## 2017-08-29 DIAGNOSIS — J01.00 ACUTE MAXILLARY SINUSITIS, RECURRENCE NOT SPECIFIED: Primary | ICD-10-CM

## 2017-08-29 PROCEDURE — 99213 OFFICE O/P EST LOW 20 MIN: CPT | Performed by: NURSE PRACTITIONER

## 2017-08-29 RX ORDER — AMOXICILLIN 875 MG/1
875 TABLET, COATED ORAL 2 TIMES DAILY
Qty: 20 TABLET | Refills: 0 | Status: SHIPPED | OUTPATIENT
Start: 2017-08-29 | End: 2017-09-19

## 2017-08-29 NOTE — PROGRESS NOTES
Subjective   Patient ID: Tony Max is a 66 y.o. male presents with No chief complaint on file.      HPI Comments: 67 yo wm  Cc: purulent nasal d/c and severe sinus congestion x 5d. He has been using zyrtec with worsening symptoms. Denies fever, cough or sob. Pt is taking eliquis for AFIB      Allergies   Allergen Reactions   • Keflex [Cephalexin] Rash   • Buspar [Buspirone]    • Pristiq [Desvenlafaxine]    • Prozac [Fluoxetine]    • Viibryd [Vilazodone Hcl]    • Zoloft [Sertraline]        The following portions of the patient's history were reviewed and updated as appropriate: allergies, current medications, past family history, past medical history, past social history, past surgical history and problem list.      Review of Systems   Constitutional: Positive for fatigue. Negative for activity change, fever and unexpected weight change.   HENT: Positive for congestion, postnasal drip, rhinorrhea, sinus pressure and sore throat. Negative for ear pain.         Hoarseness   Eyes: Negative for pain and discharge.   Respiratory: Negative for cough, chest tightness, shortness of breath and wheezing.    Cardiovascular: Negative for chest pain and palpitations.   Gastrointestinal: Negative for abdominal pain, diarrhea and vomiting.   Endocrine: Negative.    Genitourinary: Negative.    Musculoskeletal: Negative for joint swelling.   Skin: Negative for color change, rash and wound.   Allergic/Immunologic: Negative.    Neurological: Negative for seizures and syncope.   Psychiatric/Behavioral: Negative.        Objective     Vitals:    08/29/17 1420   Pulse: 90   Temp: 98.1 °F (36.7 °C)   SpO2: 98%         Physical Exam   Constitutional: He is oriented to person, place, and time. He appears well-developed and well-nourished.  Non-toxic appearance. No distress.   HENT:   Head: Normocephalic and atraumatic. Hair is normal.   Right Ear: Hearing, tympanic membrane, external ear and ear canal normal. No drainage, swelling or  tenderness.   Left Ear: Hearing, tympanic membrane, external ear and ear canal normal. No drainage, swelling or tenderness.   Nose: Mucosal edema and rhinorrhea present. No epistaxis.   Mouth/Throat: Uvula is midline and mucous membranes are normal. No oral lesions. No uvula swelling. Posterior oropharyngeal erythema present. No oropharyngeal exudate. Tonsils are 1+ on the right. Tonsils are 1+ on the left. No tonsillar exudate.   Eyes: Conjunctivae, EOM and lids are normal. Pupils are equal, round, and reactive to light. Right eye exhibits no discharge. Left eye exhibits no discharge. No scleral icterus.   Neck: Normal range of motion. Neck supple.   Cardiovascular: Normal rate, regular rhythm and normal heart sounds.  Exam reveals no gallop.    No murmur heard.  Pulmonary/Chest: Effort normal and breath sounds normal. No stridor. No respiratory distress. He has no wheezes. He has no rales. He exhibits no tenderness.   Abdominal: Soft. There is no tenderness.   Lymphadenopathy:        Head (right side): No tonsillar adenopathy present.        Head (left side): No tonsillar adenopathy present.     He has no cervical adenopathy.   Neurological: He is alert and oriented to person, place, and time. He exhibits normal muscle tone.   Skin: Skin is warm and dry. No rash noted. He is not diaphoretic.   Psychiatric: He has a normal mood and affect. His behavior is normal. Judgment and thought content normal.   Nursing note and vitals reviewed.        Diagnoses and all orders for this visit:    Acute maxillary sinusitis, recurrence not specified  -     amoxicillin (AMOXIL) 875 MG tablet; Take 1 tablet by mouth 2 (Two) Times a Day.    intra nasal saline lavage daily. Increase fluids, rest, activity as tolerated, Tylenol  OTC as needed for pain      Follow-up with Primary Care Physician in 24-48 hours if these symptoms worsen or fail to improve as anticipated.

## 2017-09-06 DIAGNOSIS — E03.9 HYPOTHYROIDISM, UNSPECIFIED TYPE: ICD-10-CM

## 2017-09-06 RX ORDER — LEVOTHYROXINE SODIUM 0.2 MG/1
200 TABLET ORAL DAILY
Qty: 30 TABLET | Refills: 0 | Status: SHIPPED | OUTPATIENT
Start: 2017-09-06 | End: 2017-09-19 | Stop reason: SDUPTHER

## 2017-09-13 DIAGNOSIS — F32.A DEPRESSION, UNSPECIFIED DEPRESSION TYPE: ICD-10-CM

## 2017-09-13 DIAGNOSIS — R73.9 HYPERGLYCEMIA: ICD-10-CM

## 2017-09-13 DIAGNOSIS — E03.9 HYPOTHYROIDISM, UNSPECIFIED TYPE: ICD-10-CM

## 2017-09-13 DIAGNOSIS — I10 BENIGN ESSENTIAL HYPERTENSION: Primary | ICD-10-CM

## 2017-09-13 DIAGNOSIS — E78.5 HYPERLIPIDEMIA, UNSPECIFIED HYPERLIPIDEMIA TYPE: ICD-10-CM

## 2017-09-14 LAB
ALBUMIN SERPL-MCNC: 4.3 G/DL (ref 3.5–5.2)
ALBUMIN/GLOB SERPL: 1.7 G/DL
ALP SERPL-CCNC: 101 U/L (ref 39–117)
ALT SERPL-CCNC: 28 U/L (ref 1–41)
AST SERPL-CCNC: 22 U/L (ref 1–40)
BASOPHILS # BLD AUTO: 0 10*3/MM3 (ref 0–0.2)
BASOPHILS NFR BLD AUTO: 0 % (ref 0–1.5)
BILIRUB SERPL-MCNC: 2 MG/DL (ref 0.1–1.2)
BUN SERPL-MCNC: 15 MG/DL (ref 8–23)
BUN/CREAT SERPL: 11.6 (ref 7–25)
CALCIUM SERPL-MCNC: 9.3 MG/DL (ref 8.6–10.5)
CHLORIDE SERPL-SCNC: 104 MMOL/L (ref 98–107)
CHOLEST SERPL-MCNC: 107 MG/DL (ref 0–200)
CO2 SERPL-SCNC: 28 MMOL/L (ref 22–29)
CREAT SERPL-MCNC: 1.29 MG/DL (ref 0.76–1.27)
EOSINOPHIL # BLD AUTO: 0 10*3/MM3 (ref 0–0.7)
EOSINOPHIL NFR BLD AUTO: 0 % (ref 0.3–6.2)
ERYTHROCYTE [DISTWIDTH] IN BLOOD BY AUTOMATED COUNT: 13.9 % (ref 11.5–14.5)
GLOBULIN SER CALC-MCNC: 2.6 GM/DL
GLUCOSE SERPL-MCNC: 94 MG/DL (ref 65–99)
HBA1C MFR BLD: 5.8 % (ref 4.8–5.6)
HCT VFR BLD AUTO: 49.3 % (ref 40.4–52.2)
HDLC SERPL-MCNC: 32 MG/DL (ref 40–60)
HGB BLD-MCNC: 16 G/DL (ref 13.7–17.6)
IMM GRANULOCYTES # BLD: 0 10*3/MM3 (ref 0–0.03)
IMM GRANULOCYTES NFR BLD: 0 % (ref 0–0.5)
LDLC SERPL CALC-MCNC: 55 MG/DL (ref 0–100)
LYMPHOCYTES # BLD AUTO: 4.05 10*3/MM3 (ref 0.9–4.8)
LYMPHOCYTES NFR BLD AUTO: 42.5 % (ref 19.6–45.3)
MCH RBC QN AUTO: 33.3 PG (ref 27–32.7)
MCHC RBC AUTO-ENTMCNC: 32.5 G/DL (ref 32.6–36.4)
MCV RBC AUTO: 102.5 FL (ref 79.8–96.2)
MONOCYTES # BLD AUTO: 0.82 10*3/MM3 (ref 0.2–1.2)
MONOCYTES NFR BLD AUTO: 8.6 % (ref 5–12)
NEUTROPHILS # BLD AUTO: 4.67 10*3/MM3 (ref 1.9–8.1)
NEUTROPHILS NFR BLD AUTO: 48.9 % (ref 42.7–76)
PLATELET # BLD AUTO: 164 10*3/MM3 (ref 140–500)
POTASSIUM SERPL-SCNC: 4.9 MMOL/L (ref 3.5–5.2)
PROT SERPL-MCNC: 6.9 G/DL (ref 6–8.5)
RBC # BLD AUTO: 4.81 10*6/MM3 (ref 4.6–6)
SODIUM SERPL-SCNC: 144 MMOL/L (ref 136–145)
TRIGL SERPL-MCNC: 101 MG/DL (ref 0–150)
TSH SERPL DL<=0.005 MIU/L-ACNC: 1.63 MIU/ML (ref 0.27–4.2)
VLDLC SERPL CALC-MCNC: 20.2 MG/DL (ref 5–40)
WBC # BLD AUTO: 9.54 10*3/MM3 (ref 4.5–10.7)

## 2017-09-19 ENCOUNTER — OFFICE VISIT (OUTPATIENT)
Dept: FAMILY MEDICINE CLINIC | Facility: CLINIC | Age: 67
End: 2017-09-19

## 2017-09-19 VITALS
HEIGHT: 74 IN | DIASTOLIC BLOOD PRESSURE: 76 MMHG | OXYGEN SATURATION: 95 % | BODY MASS INDEX: 36.7 KG/M2 | RESPIRATION RATE: 16 BRPM | HEART RATE: 71 BPM | WEIGHT: 286 LBS | SYSTOLIC BLOOD PRESSURE: 120 MMHG | TEMPERATURE: 97.7 F

## 2017-09-19 DIAGNOSIS — I48.91 ATRIAL FIBRILLATION, UNSPECIFIED TYPE (HCC): ICD-10-CM

## 2017-09-19 DIAGNOSIS — F41.1 GAD (GENERALIZED ANXIETY DISORDER): ICD-10-CM

## 2017-09-19 DIAGNOSIS — Z87.438 HISTORY OF BPH: ICD-10-CM

## 2017-09-19 DIAGNOSIS — E03.9 HYPOTHYROIDISM, UNSPECIFIED TYPE: Primary | ICD-10-CM

## 2017-09-19 DIAGNOSIS — G47.30 SLEEP APNEA, UNSPECIFIED TYPE: ICD-10-CM

## 2017-09-19 DIAGNOSIS — J30.9 ALLERGIC RHINITIS, UNSPECIFIED ALLERGIC RHINITIS TRIGGER, UNSPECIFIED RHINITIS SEASONALITY: ICD-10-CM

## 2017-09-19 PROCEDURE — 99214 OFFICE O/P EST MOD 30 MIN: CPT | Performed by: FAMILY MEDICINE

## 2017-09-19 RX ORDER — FLUTICASONE PROPIONATE 50 MCG
2 SPRAY, SUSPENSION (ML) NASAL DAILY
Qty: 3 BOTTLE | Refills: 1 | Status: SHIPPED | OUTPATIENT
Start: 2017-09-19 | End: 2019-02-26

## 2017-09-19 RX ORDER — LORAZEPAM 0.5 MG/1
0.5 TABLET ORAL EVERY 8 HOURS PRN
Qty: 30 TABLET | Refills: 2 | Status: SHIPPED | OUTPATIENT
Start: 2017-09-19 | End: 2018-02-09 | Stop reason: SDUPTHER

## 2017-09-19 RX ORDER — TAMSULOSIN HYDROCHLORIDE 0.4 MG/1
0.4 CAPSULE ORAL NIGHTLY
Qty: 90 CAPSULE | Refills: 1 | Status: SHIPPED | OUTPATIENT
Start: 2017-09-19 | End: 2018-03-12 | Stop reason: SDUPTHER

## 2017-09-19 RX ORDER — MONTELUKAST SODIUM 10 MG/1
10 TABLET ORAL NIGHTLY
Qty: 90 TABLET | Refills: 1 | Status: SHIPPED | OUTPATIENT
Start: 2017-09-19 | End: 2018-04-05 | Stop reason: SDUPTHER

## 2017-09-19 RX ORDER — LEVOTHYROXINE SODIUM 0.2 MG/1
200 TABLET ORAL DAILY
Qty: 90 TABLET | Refills: 1 | Status: SHIPPED | OUTPATIENT
Start: 2017-09-19 | End: 2018-04-05 | Stop reason: SDUPTHER

## 2017-09-19 NOTE — PROGRESS NOTES
Subjective   Tony Max is a 66 y.o. male.     History of Present Illness   Chief Complaint:   Chief Complaint   Patient presents with   • Anxiety   • Hypothyroidism   • Benign Prostatic Hypertrophy       Tony Max 66 y.o. male who presents today for Medical Management of the below listed issues and medication refills. I reviewed his lab results. The patient has read and signed the [facility] Controlled Substance Contract.  I will continue to see patient for regular follow up appointments.  They are well controlled on their medication.  FABIAN has been reviewed by me and is updated every 3 months. The patient is aware of the potential for addiction and dependence. Labs good, ifg stable    he has a problem list of   Patient Active Problem List   Diagnosis   • JAIDA (generalized anxiety disorder)   • Atrial fibrillation   • Depression   • GERD (gastroesophageal reflux disease)   • Heart attack   • HLD (hyperlipidemia)   • Benign essential hypertension   • Hypothyroidism, acquired   • Sleep apnea   • Hypothyroidism   • History of BPH   • Elevated blood sugar   • Seasonal allergies   • Acute maxillary sinusitis   .  Since the last visit, he has overall felt well.  he has been compliant with   Current Outpatient Prescriptions:   •  apixaban (ELIQUIS) 5 MG tablet tablet, Take 1 tablet by mouth 2 (Two) Times a Day., Disp: 60 tablet, Rfl:   •  aspirin 81 MG chewable tablet, Chew 1 tablet Daily., Disp: , Rfl:   •  citalopram (CeleXA) 40 MG tablet, Take 40 mg by mouth Daily., Disp: , Rfl:   •  finasteride (PROSCAR) 5 MG tablet, , Disp: , Rfl:   •  fluticasone (FLONASE) 50 MCG/ACT nasal spray, Put 2 sprays into each nostril once daily., Disp: , Rfl:   •  levothyroxine (SYNTHROID, LEVOTHROID) 200 MCG tablet, Take 1 tablet by mouth Daily., Disp: 30 tablet, Rfl: 0  •  LORazepam (ATIVAN) 0.5 MG tablet, Take 1 tablet by mouth Every 8 (Eight) Hours As Needed for Anxiety., Disp: 30 tablet, Rfl: 2  •  metoprolol succinate XL  "(TOPROL-XL) 25 MG 24 hr tablet, Take 25 mg by mouth Daily., Disp: , Rfl:   •  montelukast (SINGULAIR) 10 MG tablet, Take 1 tablet by mouth Every Night., Disp: 90 tablet, Rfl: 1  •  nitroglycerin (NITROSTAT) 0.4 MG SL tablet, Place 0.4 mg under the tongue Every 5 (Five) Minutes As Needed for chest pain. Take no more than 3 doses in 15 minutes., Disp: , Rfl:   •  simvastatin (ZOCOR) 20 MG tablet, Take 20 mg by mouth Every Night., Disp: , Rfl:   •  tamsulosin (FLOMAX) 0.4 MG capsule 24 hr capsule, Take 1 capsule by mouth Every Night., Disp: 90 capsule, Rfl: 1.  he denies medication side effects.    All of the chronic condition(s) listed above are stable w/o issues.    /76  Pulse 71  Temp 97.7 °F (36.5 °C) (Oral)   Resp 16  Ht 74\" (188 cm)  Wt 286 lb (130 kg)  SpO2 95%  BMI 36.72 kg/m2    Results for orders placed or performed in visit on 09/13/17   Comprehensive metabolic panel   Result Value Ref Range    Glucose 94 65 - 99 mg/dL    BUN 15 8 - 23 mg/dL    Creatinine 1.29 (H) 0.76 - 1.27 mg/dL    eGFR Non African Am 56 (L) >60 mL/min/1.73    eGFR African Am 67 >60 mL/min/1.73    BUN/Creatinine Ratio 11.6 7.0 - 25.0    Sodium 144 136 - 145 mmol/L    Potassium 4.9 3.5 - 5.2 mmol/L    Chloride 104 98 - 107 mmol/L    Total CO2 28.0 22.0 - 29.0 mmol/L    Calcium 9.3 8.6 - 10.5 mg/dL    Total Protein 6.9 6.0 - 8.5 g/dL    Albumin 4.30 3.50 - 5.20 g/dL    Globulin 2.6 gm/dL    A/G Ratio 1.7 g/dL    Total Bilirubin 2.0 (H) 0.1 - 1.2 mg/dL    Alkaline Phosphatase 101 39 - 117 U/L    AST (SGOT) 22 1 - 40 U/L    ALT (SGPT) 28 1 - 41 U/L   Lipid panel   Result Value Ref Range    Total Cholesterol 107 0 - 200 mg/dL    Triglycerides 101 0 - 150 mg/dL    HDL Cholesterol 32 (L) 40 - 60 mg/dL    VLDL Cholesterol 20.2 5 - 40 mg/dL    LDL Cholesterol  55 0 - 100 mg/dL   TSH   Result Value Ref Range    TSH 1.630 0.270 - 4.200 mIU/mL   Hemoglobin A1c   Result Value Ref Range    Hemoglobin A1C 5.80 (H) 4.80 - 5.60 %   CBC and " Differential   Result Value Ref Range    WBC 9.54 4.50 - 10.70 10*3/mm3    RBC 4.81 4.60 - 6.00 10*6/mm3    Hemoglobin 16.0 13.7 - 17.6 g/dL    Hematocrit 49.3 40.4 - 52.2 %    .5 (H) 79.8 - 96.2 fL    MCH 33.3 (H) 27.0 - 32.7 pg    MCHC 32.5 (L) 32.6 - 36.4 g/dL    RDW 13.9 11.5 - 14.5 %    Platelets 164 140 - 500 10*3/mm3    Neutrophil Rel % 48.9 42.7 - 76.0 %    Lymphocyte Rel % 42.5 19.6 - 45.3 %    Monocyte Rel % 8.6 5.0 - 12.0 %    Eosinophil Rel % 0.0 (L) 0.3 - 6.2 %    Basophil Rel % 0.0 0.0 - 1.5 %    Neutrophils Absolute 4.67 1.90 - 8.10 10*3/mm3    Lymphocytes Absolute 4.05 0.90 - 4.80 10*3/mm3    Monocytes Absolute 0.82 0.20 - 1.20 10*3/mm3    Eosinophils Absolute 0.00 0.00 - 0.70 10*3/mm3    Basophils Absolute 0.00 0.00 - 0.20 10*3/mm3    Immature Granulocyte Rel % 0.0 0.0 - 0.5 %    Immature Grans Absolute 0.00 0.00 - 0.03 10*3/mm3         The following portions of the patient's history were reviewed and updated as appropriate: allergies, current medications, past family history, past medical history, past social history, past surgical history and problem list.    Review of Systems   Constitutional: Negative for activity change, appetite change and unexpected weight change.   Eyes: Negative for visual disturbance.   Respiratory: Negative for chest tightness and shortness of breath.    Cardiovascular: Negative for chest pain and palpitations.   Skin: Negative for color change.   Neurological: Negative for syncope and speech difficulty.   Psychiatric/Behavioral: Negative for confusion and decreased concentration.       Objective   Physical Exam   Constitutional: He is oriented to person, place, and time. He appears well-developed and well-nourished.   A fib   HENT:   Head: Normocephalic and atraumatic.   Right Ear: External ear normal.   Left Ear: External ear normal.   Nose: Nose normal.   Mouth/Throat: Oropharynx is clear and moist.   Eyes: Conjunctivae and EOM are normal. Pupils are equal,  round, and reactive to light. Right eye exhibits no discharge. Left eye exhibits no discharge.   Neck: No thyromegaly present.   Cardiovascular:   No murmur heard.  Pulmonary/Chest: No respiratory distress. He has no wheezes. He has no rales.   Abdominal: Soft.   Musculoskeletal: Normal range of motion.   Lymphadenopathy:     He has no cervical adenopathy.   Neurological: He is alert and oriented to person, place, and time.   Skin: Skin is warm and dry.   Psychiatric: He has a normal mood and affect. His behavior is normal.   Nursing note and vitals reviewed.      Assessment/Plan   Tony was seen today for anxiety, hypothyroidism and benign prostatic hypertrophy.    Diagnoses and all orders for this visit:    Hypothyroidism, unspecified type  -     levothyroxine (SYNTHROID, LEVOTHROID) 200 MCG tablet; Take 1 tablet by mouth Daily.    JAIDA (generalized anxiety disorder)  -     LORazepam (ATIVAN) 0.5 MG tablet; Take 1 tablet by mouth Every 8 (Eight) Hours As Needed for Anxiety.    Allergic rhinitis, unspecified allergic rhinitis trigger, unspecified rhinitis seasonality  -     montelukast (SINGULAIR) 10 MG tablet; Take 1 tablet by mouth Every Night.  -     fluticasone (FLONASE) 50 MCG/ACT nasal spray; 2 sprays into each nostril Daily.    History of BPH  -     tamsulosin (FLOMAX) 0.4 MG capsule 24 hr capsule; Take 1 capsule by mouth Every Night.    Atrial fibrillation, unspecified type    Sleep apnea, unspecified type

## 2017-09-19 NOTE — PATIENT INSTRUCTIONS
Exercise 30 minutes most days of the week  Sleep 6-8 hours each night if possible  Low fat, low cholesterol diet   we discussed prescribed medications and how to take them   make sure you get results of any labs/studies ordered today  Low glycemic index diet      Patients must be seen every 3 months for their presription medication review and refill required by [place] law.  Patient has been advised on the potential for addiction  and dependence to their prescribed scheduled medication.  FABIAN was obtained today and reviewed. This was scanned into the patient's chart.  Flonase,afrin nasal spray, mucinex, zyrtec

## 2017-12-08 ENCOUNTER — OFFICE VISIT (OUTPATIENT)
Dept: FAMILY MEDICINE CLINIC | Facility: CLINIC | Age: 67
End: 2017-12-08

## 2017-12-08 VITALS
SYSTOLIC BLOOD PRESSURE: 147 MMHG | TEMPERATURE: 98 F | HEIGHT: 74 IN | BODY MASS INDEX: 37.86 KG/M2 | OXYGEN SATURATION: 96 % | DIASTOLIC BLOOD PRESSURE: 97 MMHG | HEART RATE: 84 BPM | RESPIRATION RATE: 18 BRPM | WEIGHT: 295 LBS

## 2017-12-08 DIAGNOSIS — N39.0 ACUTE UTI: Primary | ICD-10-CM

## 2017-12-08 DIAGNOSIS — N45.1 EPIDIDYMITIS: ICD-10-CM

## 2017-12-08 LAB
BILIRUB BLD-MCNC: NEGATIVE MG/DL
CLARITY, POC: CLEAR
COLOR UR: ABNORMAL
GLUCOSE UR STRIP-MCNC: NEGATIVE MG/DL
KETONES UR QL: NEGATIVE
LEUKOCYTE EST, POC: ABNORMAL
NITRITE UR-MCNC: POSITIVE MG/ML
PH UR: 6 [PH] (ref 5–8)
PROT UR STRIP-MCNC: ABNORMAL MG/DL
RBC # UR STRIP: ABNORMAL /UL
SP GR UR: 1.03 (ref 1–1.03)
UROBILINOGEN UR QL: NORMAL

## 2017-12-08 PROCEDURE — 81003 URINALYSIS AUTO W/O SCOPE: CPT | Performed by: NURSE PRACTITIONER

## 2017-12-08 PROCEDURE — 99213 OFFICE O/P EST LOW 20 MIN: CPT | Performed by: NURSE PRACTITIONER

## 2017-12-08 RX ORDER — LEVOFLOXACIN 500 MG/1
500 TABLET, FILM COATED ORAL DAILY
Qty: 14 TABLET | Refills: 0 | Status: SHIPPED | OUTPATIENT
Start: 2017-12-08 | End: 2017-12-18

## 2017-12-08 NOTE — PROGRESS NOTES
Subjective   Tony Max is a 67 y.o. male.     History of Present Illness   Tony Max 67 y.o. male who presents for evaluation of testicular pain. Symptoms started several days. Symptoms include right testicular pain.  No swelling, erythema or discharge.   STI Risk: Very low risk of STD exposure.  He denies having hematuria, fever, chills, sweats, and abdominal pain.  Had similar pain in the past and was diagnosed with epididymitis.      The following portions of the patient's history were reviewed and updated as appropriate: allergies, current medications, past family history, past medical history, past social history, past surgical history and problem list.    Review of Systems   Constitutional: Negative for chills and fever.   Genitourinary: Positive for testicular pain. Negative for decreased urine volume, difficulty urinating, discharge, dysuria, flank pain, frequency, hematuria, penile pain, penile swelling, scrotal swelling and urgency.       Objective   Physical Exam   Constitutional: He is oriented to person, place, and time. He appears well-developed and well-nourished.   Pulmonary/Chest: Effort normal.   Genitourinary: Penis normal. Right testis shows tenderness. Right testis shows no mass and no swelling. Right testis is descended. Cremasteric reflex is not absent on the right side. Left testis shows no mass, no swelling and no tenderness. Left testis is descended. Cremasteric reflex is not absent on the left side.   Neurological: He is oriented to person, place, and time.   Skin: Skin is warm and dry.   Psychiatric: He has a normal mood and affect. His behavior is normal. Judgment and thought content normal.   Nursing note and vitals reviewed.      Assessment/Plan   Tony was seen today for groin pain.    Diagnoses and all orders for this visit:    Acute UTI  -     levoFLOXacin (LEVAQUIN) 500 MG tablet; Take 1 tablet by mouth Daily for 10 days.  -     Urine Culture - Urine, Urine, Clean Catch  -      POCT urinalysis dipstick, automated    Epididymitis  -     levoFLOXacin (LEVAQUIN) 500 MG tablet; Take 1 tablet by mouth Daily for 10 days.  -     Urine Culture - Urine, Urine, Clean Catch  -     POCT urinalysis dipstick, automated    Has f/u with urology in 2 weeks.

## 2017-12-12 LAB
BACTERIA UR CULT: ABNORMAL
BACTERIA UR CULT: ABNORMAL
OTHER ANTIBIOTIC SUSC ISLT: ABNORMAL

## 2017-12-13 RX ORDER — AMOXICILLIN AND CLAVULANATE POTASSIUM 875; 125 MG/1; MG/1
1 TABLET, FILM COATED ORAL 2 TIMES DAILY
Qty: 14 TABLET | Refills: 0 | Status: SHIPPED | OUTPATIENT
Start: 2017-12-13 | End: 2018-01-17

## 2018-01-17 ENCOUNTER — OFFICE VISIT (OUTPATIENT)
Dept: FAMILY MEDICINE CLINIC | Facility: CLINIC | Age: 68
End: 2018-01-17

## 2018-01-17 VITALS
RESPIRATION RATE: 16 BRPM | DIASTOLIC BLOOD PRESSURE: 100 MMHG | OXYGEN SATURATION: 95 % | HEART RATE: 92 BPM | TEMPERATURE: 97.7 F | WEIGHT: 295 LBS | SYSTOLIC BLOOD PRESSURE: 140 MMHG | BODY MASS INDEX: 37.86 KG/M2 | HEIGHT: 74 IN

## 2018-01-17 DIAGNOSIS — H93.8X3 EAR PRESSURE, BILATERAL: Primary | ICD-10-CM

## 2018-01-17 DIAGNOSIS — I15.8 OTHER SECONDARY HYPERTENSION: ICD-10-CM

## 2018-01-17 DIAGNOSIS — R42 LIGHTHEADEDNESS: ICD-10-CM

## 2018-01-17 DIAGNOSIS — H92.03 EARACHE SYMPTOMS, BILATERAL: ICD-10-CM

## 2018-01-17 PROCEDURE — 99214 OFFICE O/P EST MOD 30 MIN: CPT | Performed by: FAMILY MEDICINE

## 2018-01-17 RX ORDER — LISINOPRIL 5 MG/1
5 TABLET ORAL DAILY
Qty: 30 TABLET | Refills: 2 | Status: SHIPPED | OUTPATIENT
Start: 2018-01-17 | End: 2018-02-09 | Stop reason: SDUPTHER

## 2018-01-17 RX ORDER — AMOXICILLIN 500 MG/1
500 CAPSULE ORAL 3 TIMES DAILY
Qty: 30 CAPSULE | Refills: 0 | Status: SHIPPED | OUTPATIENT
Start: 2018-01-17 | End: 2018-02-09

## 2018-01-17 RX ORDER — MELOXICAM 15 MG/1
TABLET ORAL
COMMUNITY
Start: 2018-01-07 | End: 2018-02-23

## 2018-01-17 NOTE — PROGRESS NOTES
Subjective   Tony Max is a 67 y.o. male.     History of Present Illness   Chief Complaint:   Chief Complaint   Patient presents with   • ear pressure   • Dizziness       Tony Max 67 y.o. male who presents for evaluation of ear pressure, lightheadness. Symptoms include ear pressure.  Onset of symptoms was 2 months ago, unchanged since that time. Patient denies upper respiratory congestion.   Evaluation to date: was seen recently by me Treatment to date:  none. BP /100  ADD LISINOPRIL 5MG DAILY    .  he has a problem list of   Patient Active Problem List   Diagnosis   • JAIDA (generalized anxiety disorder)   • Atrial fibrillation   • Depression   • GERD (gastroesophageal reflux disease)   • Heart attack   • HLD (hyperlipidemia)   • Benign essential hypertension   • Hypothyroidism, acquired   • Sleep apnea   • Hypothyroidism   • History of BPH   • Elevated blood sugar   • Seasonal allergies   • Acute maxillary sinusitis   .  Since the last visit, he has overall felt well.  he has been compliant with   Current Outpatient Prescriptions:   •  amoxicillin-clavulanate (AUGMENTIN) 875-125 MG per tablet, Take 1 tablet by mouth 2 (Two) Times a Day., Disp: 14 tablet, Rfl: 0  •  apixaban (ELIQUIS) 5 MG tablet tablet, Take 1 tablet by mouth 2 (Two) Times a Day., Disp: 60 tablet, Rfl:   •  aspirin 81 MG chewable tablet, Chew 1 tablet Daily., Disp: , Rfl:   •  citalopram (CeleXA) 40 MG tablet, Take 40 mg by mouth Daily., Disp: , Rfl:   •  finasteride (PROSCAR) 5 MG tablet, , Disp: , Rfl:   •  fluticasone (FLONASE) 50 MCG/ACT nasal spray, 2 sprays into each nostril Daily., Disp: 3 bottle, Rfl: 1  •  levothyroxine (SYNTHROID, LEVOTHROID) 200 MCG tablet, Take 1 tablet by mouth Daily., Disp: 90 tablet, Rfl: 1  •  LORazepam (ATIVAN) 0.5 MG tablet, Take 1 tablet by mouth Every 8 (Eight) Hours As Needed for Anxiety., Disp: 30 tablet, Rfl: 2  •  metoprolol succinate XL (TOPROL-XL) 25 MG 24 hr tablet, Take 25 mg by mouth  "Daily., Disp: , Rfl:   •  montelukast (SINGULAIR) 10 MG tablet, Take 1 tablet by mouth Every Night., Disp: 90 tablet, Rfl: 1  •  nitroglycerin (NITROSTAT) 0.4 MG SL tablet, Place 0.4 mg under the tongue Every 5 (Five) Minutes As Needed for chest pain. Take no more than 3 doses in 15 minutes., Disp: , Rfl:   •  simvastatin (ZOCOR) 20 MG tablet, Take 20 mg by mouth Every Night., Disp: , Rfl:   •  tamsulosin (FLOMAX) 0.4 MG capsule 24 hr capsule, Take 1 capsule by mouth Every Night., Disp: 90 capsule, Rfl: 1.  he denies medication side effects.    All of the chronic condition(s) listed above are stable w/o issues.    /95  Pulse 92  Temp 97.7 °F (36.5 °C) (Oral)   Resp 16  Ht 188 cm (74\")  Wt 134 kg (295 lb)  SpO2 95%  BMI 37.88 kg/m2    The following portions of the patient's history were reviewed and updated as appropriate: allergies, current medications, past family history, past medical history, past social history, past surgical history and problem list.    Review of Systems   Constitutional: Negative for activity change, appetite change and unexpected weight change.   Eyes: Negative for visual disturbance.   Respiratory: Negative for chest tightness and shortness of breath.    Cardiovascular: Negative for chest pain and palpitations.   Skin: Negative for color change.   Neurological: Negative for syncope and speech difficulty.   Psychiatric/Behavioral: Negative for confusion and decreased concentration.       Objective   Physical Exam   Constitutional: He is oriented to person, place, and time. He appears well-developed and well-nourished.   A FIB   HENT:   Head: Normocephalic and atraumatic.   Right Ear: External ear normal.   Left Ear: External ear normal.   Nose: Nose normal.   Mouth/Throat: Oropharynx is clear and moist.   SEROUS OTITIS EARS   Eyes: Conjunctivae and EOM are normal. Pupils are equal, round, and reactive to light.   Neck: Normal range of motion. Neck supple.   Pulmonary/Chest: " Effort normal and breath sounds normal.   Neurological: He is alert and oriented to person, place, and time.       Assessment/Plan   Tony was seen today for ear pressure and dizziness.    Diagnoses and all orders for this visit:    Ear pressure, bilateral  Comments:  SEROUS OTITIS  BILATERAL    Earache symptoms, bilateral    Lightheadedness    Other secondary hypertension    Other orders  -     amoxicillin (AMOXIL) 500 MG capsule; Take 1 capsule by mouth 3 (Three) Times a Day.  -     lisinopril (PRINIVIL,ZESTRIL) 5 MG tablet; Take 1 tablet by mouth Daily.

## 2018-01-19 ENCOUNTER — TELEPHONE (OUTPATIENT)
Dept: FAMILY MEDICINE CLINIC | Facility: CLINIC | Age: 68
End: 2018-01-19

## 2018-01-19 RX ORDER — AZITHROMYCIN 250 MG/1
TABLET, FILM COATED ORAL
Qty: 6 TABLET | Refills: 0 | Status: SHIPPED | OUTPATIENT
Start: 2018-01-19 | End: 2018-02-09

## 2018-01-19 NOTE — TELEPHONE ENCOUNTER
Pt was in the office on 1/17/18. He took one amoxicillin on Wed and felt very nervous. He tried again yesterday and felt the same way. He is wanting to change this medication.

## 2018-02-07 ENCOUNTER — TELEPHONE (OUTPATIENT)
Dept: FAMILY MEDICINE CLINIC | Facility: CLINIC | Age: 68
End: 2018-02-07

## 2018-02-07 NOTE — TELEPHONE ENCOUNTER
Pt seen you on 1/17/18 and was given lisinopril 5 mg. His BP is still elevated 149/106, 156/119. He is wanting to know what to do.

## 2018-02-08 ENCOUNTER — TELEPHONE (OUTPATIENT)
Dept: FAMILY MEDICINE CLINIC | Facility: CLINIC | Age: 68
End: 2018-02-08

## 2018-02-08 NOTE — TELEPHONE ENCOUNTER
PATIENT CALLING WITH SECOND READING /102; DR DAVE INSTRUCTED TO TAKE ANOTHER 5MG LISINOPRIL= 15MG TOTAL AND TO TAKE 15MG IN THE MORNING BEFORE APPOINTMENT

## 2018-02-08 NOTE — TELEPHONE ENCOUNTER
WHEN I SAW HIM 3 WEEKS AGO HIS BP /100      INCREASE LISINOPRIL TO 10 MG THIS AM AND TELL HIM TO CALL YOU HIS BP IN 6 HOURS  AND CALL BP TO ME AND I MAY INCREASE LISINOPRIL MORE,  MAKE APPOINTMENT Friday SO I CAN TAKE HIS BP AND TELL HIM TO BRING IN HIS CUFF SO I CAN COMPARE TO OUR CUFF,

## 2018-02-09 ENCOUNTER — OFFICE VISIT (OUTPATIENT)
Dept: FAMILY MEDICINE CLINIC | Facility: CLINIC | Age: 68
End: 2018-02-09

## 2018-02-09 VITALS
TEMPERATURE: 97 F | SYSTOLIC BLOOD PRESSURE: 139 MMHG | OXYGEN SATURATION: 96 % | BODY MASS INDEX: 37.99 KG/M2 | HEART RATE: 87 BPM | HEIGHT: 74 IN | DIASTOLIC BLOOD PRESSURE: 90 MMHG | WEIGHT: 296 LBS | RESPIRATION RATE: 16 BRPM

## 2018-02-09 DIAGNOSIS — F41.1 GAD (GENERALIZED ANXIETY DISORDER): ICD-10-CM

## 2018-02-09 DIAGNOSIS — H93.8X3 EAR PRESSURE, BILATERAL: ICD-10-CM

## 2018-02-09 DIAGNOSIS — I10 BENIGN ESSENTIAL HYPERTENSION: Primary | ICD-10-CM

## 2018-02-09 PROCEDURE — 99214 OFFICE O/P EST MOD 30 MIN: CPT | Performed by: FAMILY MEDICINE

## 2018-02-09 RX ORDER — LORAZEPAM 0.5 MG/1
0.5 TABLET ORAL EVERY 8 HOURS PRN
Qty: 30 TABLET | Refills: 2 | Status: SHIPPED | OUTPATIENT
Start: 2018-02-09 | End: 2021-03-01 | Stop reason: ALTCHOICE

## 2018-02-09 RX ORDER — LISINOPRIL 5 MG/1
TABLET ORAL
Qty: 90 TABLET | Refills: 2 | Status: SHIPPED | OUTPATIENT
Start: 2018-02-09 | End: 2018-02-23

## 2018-02-09 NOTE — PATIENT INSTRUCTIONS
Exercise 30 minutes most days of the week  Sleep 6-8 hours each night if possible  Low fat, low cholesterol diet   we discussed prescribed medications and how to take them   make sure you get results of any labs/studies ordered today  Low glycemic index diet      Patients must be seen every 3 months for their presription medication review and refill required by [place] law.  Patient has been advised on the potential for addiction  and dependence to their prescribed scheduled medication.  FABIAN was obtained today and reviewed. This was scanned into the patient's chart.  See me 1 week

## 2018-02-09 NOTE — PROGRESS NOTES
Subjective   Tony Max is a 67 y.o. male.     History of Present Illness   Chief Complaint:   Chief Complaint   Patient presents with   • Hypertension   • Anxiety       Tony Max 67 y.o. male who presents today with complaints of elevated blood pressure and Medical Management of the below listed issues and medication refills. He took three of his Lisinopril 5 mg (15 mg)  this morning.  His blood pressure in the office today was 138/83 and 139/90. His machine was 159/99. 153/110 also on his bp cuff. He complains of continuing fluid on his ears. He requests av referral to an ENT. The patient has read and signed the [facility] Controlled Substance Contract.  I will continue to see patient for regular follow up appointments.  They are well controlled on their medication.  FABIAN has been reviewed by me and is updated every 3 months. The patient is aware of the potential for addiction and dependence.\   he has a problem list of    Stay with lisinopril  5mg   2 am  1 nite.   bp 120/82    HIS BP CUFF GIVES VERY HIGH AND WRONG READINGS.  Patient Active Problem List   Diagnosis   • JAIDA (generalized anxiety disorder)   • Atrial fibrillation   • Depression   • GERD (gastroesophageal reflux disease)   • Heart attack   • HLD (hyperlipidemia)   • Benign essential hypertension   • Hypothyroidism, acquired   • Sleep apnea   • Hypothyroidism   • History of BPH   • Elevated blood sugar   • Seasonal allergies   • Acute maxillary sinusitis   .  Since the last visit, he has overall felt well.  he has been compliant with   Current Outpatient Prescriptions:   •  apixaban (ELIQUIS) 5 MG tablet tablet, Take 1 tablet by mouth 2 (Two) Times a Day., Disp: 60 tablet, Rfl:   •  aspirin 81 MG chewable tablet, Chew 1 tablet Daily., Disp: , Rfl:   •  citalopram (CeleXA) 40 MG tablet, Take 40 mg by mouth Daily., Disp: , Rfl:   •  finasteride (PROSCAR) 5 MG tablet, , Disp: , Rfl:   •  fluticasone (FLONASE) 50 MCG/ACT nasal spray, 2 sprays  "into each nostril Daily., Disp: 3 bottle, Rfl: 1  •  levothyroxine (SYNTHROID, LEVOTHROID) 200 MCG tablet, Take 1 tablet by mouth Daily., Disp: 90 tablet, Rfl: 1  •  lisinopril (PRINIVIL,ZESTRIL) 5 MG tablet, Take 1 tablet by mouth Daily., Disp: 30 tablet, Rfl: 2  •  LORazepam (ATIVAN) 0.5 MG tablet, Take 1 tablet by mouth Every 8 (Eight) Hours As Needed for Anxiety., Disp: 30 tablet, Rfl: 2  •  meloxicam (MOBIC) 15 MG tablet, , Disp: , Rfl:   •  metoprolol succinate XL (TOPROL-XL) 25 MG 24 hr tablet, Take 25 mg by mouth Daily., Disp: , Rfl:   •  montelukast (SINGULAIR) 10 MG tablet, Take 1 tablet by mouth Every Night., Disp: 90 tablet, Rfl: 1  •  nitroglycerin (NITROSTAT) 0.4 MG SL tablet, Place 0.4 mg under the tongue Every 5 (Five) Minutes As Needed for chest pain. Take no more than 3 doses in 15 minutes., Disp: , Rfl:   •  simvastatin (ZOCOR) 20 MG tablet, Take 20 mg by mouth Every Night., Disp: , Rfl:   •  tamsulosin (FLOMAX) 0.4 MG capsule 24 hr capsule, Take 1 capsule by mouth Every Night., Disp: 90 capsule, Rfl: 1.  he denies medication side effects.    All of the chronic condition(s) listed above are stable w/o issues.    /90  Pulse 87  Temp 97 °F (36.1 °C) (Oral)   Resp 16  Ht 188 cm (74\")  Wt 134 kg (296 lb)  SpO2 96%  BMI 38 kg/m2    The following portions of the patient's history were reviewed and updated as appropriate: allergies, current medications, past family history, past medical history, past social history, past surgical history and problem list.    Review of Systems   Constitutional: Negative for activity change, appetite change, chills, fatigue, fever and unexpected weight change.   HENT: Negative for congestion, ear pain, hearing loss, mouth sores, nosebleeds, rhinorrhea and sore throat.         Ear pressure   Eyes: Negative for pain and visual disturbance.   Respiratory: Negative for cough, shortness of breath and wheezing.    Cardiovascular: Negative for chest pain, " palpitations and leg swelling.   Gastrointestinal: Negative for abdominal distention, abdominal pain, blood in stool, constipation, diarrhea, nausea and vomiting.   Endocrine: Negative for cold intolerance and heat intolerance.   Genitourinary: Negative for difficulty urinating, discharge, dysuria, frequency, hematuria and urgency.   Musculoskeletal: Negative for back pain and joint swelling.   Skin: Negative for rash and wound.   Neurological: Negative for dizziness, weakness, numbness and headaches.   Hematological: Does not bruise/bleed easily.   Psychiatric/Behavioral: Negative for confusion, dysphoric mood, sleep disturbance and suicidal ideas. The patient is not nervous/anxious.        Objective   Physical Exam   Constitutional: He is oriented to person, place, and time. He appears well-developed and well-nourished.   HENT:   Head: Atraumatic.   Mouth/Throat: Oropharynx is clear and moist.   Eyes: EOM are normal. Pupils are equal, round, and reactive to light.   Neck: Normal range of motion. Neck supple. No thyromegaly present.   Cardiovascular: Normal rate and regular rhythm.    Pulmonary/Chest: Effort normal and breath sounds normal.   Abdominal: Soft.   Musculoskeletal: Normal range of motion.   Neurological: He is alert and oriented to person, place, and time.   Skin: Skin is warm and dry.   Psychiatric: He has a normal mood and affect. His behavior is normal.   Nursing note and vitals reviewed.      Assessment/Plan   Tony was seen today for hypertension and anxiety.    Diagnoses and all orders for this visit:    Benign essential hypertension    JAIDA (generalized anxiety disorder)  -     LORazepam (ATIVAN) 0.5 MG tablet; Take 1 tablet by mouth Every 8 (Eight) Hours As Needed for Anxiety.

## 2018-02-16 ENCOUNTER — OFFICE VISIT (OUTPATIENT)
Dept: FAMILY MEDICINE CLINIC | Facility: CLINIC | Age: 68
End: 2018-02-16

## 2018-02-16 VITALS
SYSTOLIC BLOOD PRESSURE: 140 MMHG | DIASTOLIC BLOOD PRESSURE: 81 MMHG | WEIGHT: 296 LBS | OXYGEN SATURATION: 97 % | BODY MASS INDEX: 37.99 KG/M2 | RESPIRATION RATE: 16 BRPM | TEMPERATURE: 97 F | HEIGHT: 74 IN | HEART RATE: 97 BPM

## 2018-02-16 DIAGNOSIS — I10 ESSENTIAL HYPERTENSION: Primary | ICD-10-CM

## 2018-02-16 DIAGNOSIS — I48.91 ATRIAL FIBRILLATION, UNSPECIFIED TYPE (HCC): ICD-10-CM

## 2018-02-16 PROCEDURE — 99213 OFFICE O/P EST LOW 20 MIN: CPT | Performed by: FAMILY MEDICINE

## 2018-02-16 NOTE — PATIENT INSTRUCTIONS
Exercise 30 minutes most days of the week  Sleep 6-8 hours each night if possible  Low fat, low cholesterol diet   we discussed prescribed medications and how to take them   make sure you get results of any labs/studies ordered today  Low glycemic index diet     See me 1 week

## 2018-02-20 ENCOUNTER — OFFICE VISIT (OUTPATIENT)
Dept: FAMILY MEDICINE CLINIC | Facility: CLINIC | Age: 68
End: 2018-02-20

## 2018-02-20 VITALS
OXYGEN SATURATION: 95 % | HEART RATE: 115 BPM | WEIGHT: 281 LBS | HEIGHT: 74 IN | BODY MASS INDEX: 36.06 KG/M2 | TEMPERATURE: 97.5 F | RESPIRATION RATE: 20 BRPM | SYSTOLIC BLOOD PRESSURE: 118 MMHG | DIASTOLIC BLOOD PRESSURE: 80 MMHG

## 2018-02-20 DIAGNOSIS — F32.A DEPRESSION, UNSPECIFIED DEPRESSION TYPE: ICD-10-CM

## 2018-02-20 DIAGNOSIS — I10 BENIGN ESSENTIAL HYPERTENSION: ICD-10-CM

## 2018-02-20 DIAGNOSIS — F41.1 GAD (GENERALIZED ANXIETY DISORDER): Primary | ICD-10-CM

## 2018-02-20 DIAGNOSIS — E03.9 HYPOTHYROIDISM, UNSPECIFIED TYPE: ICD-10-CM

## 2018-02-20 DIAGNOSIS — I48.0 PAROXYSMAL ATRIAL FIBRILLATION (HCC): ICD-10-CM

## 2018-02-20 PROCEDURE — 99214 OFFICE O/P EST MOD 30 MIN: CPT | Performed by: FAMILY MEDICINE

## 2018-02-20 NOTE — PATIENT INSTRUCTIONS
Exercise 30 minutes most days of the week  Sleep 6-8 hours each night if possible  Low fat, low cholesterol diet   we discussed prescribed medications and how to take them   make sure you get results of any labs/studies ordered today  Low glycemic index diet   To pete er now   SEE ME NEXT WEEK  ABOUT ANXIETY AND BP  BRING ME LOG OF BP  AND MEDS

## 2018-02-20 NOTE — PROGRESS NOTES
Subjective   Tony Max is a 67 y.o. male.   Chief Complaint:   Chief Complaint   Patient presents with   • Hypertension   • Anxiety   • Diarrhea   • Nausea   • Dizziness       Tony Max 67 y.o. male who presents today for hypertension and anxiety.  Weak today and anxiety increased  No chest pain. A fib   bp 130/80/  Feels weak and will send to er for evaluation ANXIETY INCREASED, . he has a problem list of   Patient Active Problem List   Diagnosis   • JAIDA (generalized anxiety disorder)   • Atrial fibrillation   • Depression   • GERD (gastroesophageal reflux disease)   • Heart attack   • HLD (hyperlipidemia)   • Benign essential hypertension   • Hypothyroidism, acquired   • Sleep apnea   • Hypothyroidism   • History of BPH   • Elevated blood sugar   • Seasonal allergies   • Acute maxillary sinusitis   .  Since the last visit, he has overall felt well.  he has been compliant with   Current Outpatient Prescriptions:   •  apixaban (ELIQUIS) 5 MG tablet tablet, Take 1 tablet by mouth 2 (Two) Times a Day., Disp: 60 tablet, Rfl:   •  aspirin 81 MG chewable tablet, Chew 1 tablet Daily., Disp: , Rfl:   •  citalopram (CeleXA) 40 MG tablet, Take 40 mg by mouth Daily., Disp: , Rfl:   •  finasteride (PROSCAR) 5 MG tablet, , Disp: , Rfl:   •  fluticasone (FLONASE) 50 MCG/ACT nasal spray, 2 sprays into each nostril Daily., Disp: 3 bottle, Rfl: 1  •  levothyroxine (SYNTHROID, LEVOTHROID) 200 MCG tablet, Take 1 tablet by mouth Daily., Disp: 90 tablet, Rfl: 1  •  lisinopril (PRINIVIL,ZESTRIL) 5 MG tablet, 2 PO Q AM AND 1 PO Q PM (Patient taking differently: Take 5 mg by mouth Daily. 2 PO Q AM AND 1 PO Q PM), Disp: 90 tablet, Rfl: 2  •  LORazepam (ATIVAN) 0.5 MG tablet, Take 1 tablet by mouth Every 8 (Eight) Hours As Needed for Anxiety., Disp: 30 tablet, Rfl: 2  •  meloxicam (MOBIC) 15 MG tablet, , Disp: , Rfl:   •  metoprolol succinate XL (TOPROL-XL) 25 MG 24 hr tablet, Take 25 mg by mouth Daily., Disp: , Rfl:   •   "montelukast (SINGULAIR) 10 MG tablet, Take 1 tablet by mouth Every Night., Disp: 90 tablet, Rfl: 1  •  nitroglycerin (NITROSTAT) 0.4 MG SL tablet, Place 0.4 mg under the tongue Every 5 (Five) Minutes As Needed for chest pain. Take no more than 3 doses in 15 minutes., Disp: , Rfl:   •  simvastatin (ZOCOR) 20 MG tablet, Take 20 mg by mouth Every Night., Disp: , Rfl:   •  tamsulosin (FLOMAX) 0.4 MG capsule 24 hr capsule, Take 1 capsule by mouth Every Night., Disp: 90 capsule, Rfl: 1.  he denies medication side effects.    All of the chronic condition(s) listed above are stable w/o issues.    /80  Pulse 115  Temp 97.5 °F (36.4 °C)  Resp 20  Ht 188 cm (74.02\")  Wt 127 kg (281 lb)  SpO2 95%  BMI 36.06 kg/m2          History of Present Illness     The following portions of the patient's history were reviewed and updated as appropriate: allergies, current medications, past family history, past medical history, past social history, past surgical history and problem list.    Review of Systems   Constitutional: Negative for activity change, appetite change and unexpected weight change.   Eyes: Negative for visual disturbance.   Respiratory: Negative for chest tightness and shortness of breath.    Cardiovascular: Negative for chest pain and palpitations.   Skin: Negative for color change.   Neurological: Positive for dizziness and weakness. Negative for syncope and speech difficulty.   Psychiatric/Behavioral: Negative for confusion and decreased concentration.       Objective   Physical Exam   Constitutional: He is oriented to person, place, and time. He appears well-developed and well-nourished.   HENT:   Head: Atraumatic.   Mouth/Throat: Oropharynx is clear and moist.   Eyes: EOM are normal. Pupils are equal, round, and reactive to light.   Neck: Normal range of motion. Neck supple. No thyromegaly present.   Cardiovascular: Normal rate and regular rhythm.    Pulmonary/Chest: Effort normal and breath sounds " normal.   Abdominal: Soft.   Musculoskeletal: Normal range of motion.   Neurological: He is alert and oriented to person, place, and time.   Skin: Skin is warm and dry.   Psychiatric: He has a normal mood and affect. His behavior is normal.   Nursing note and vitals reviewed.      Assessment/Plan   Tony was seen today for hypertension, anxiety, diarrhea, nausea and dizziness.    Diagnoses and all orders for this visit:    Paroxysmal atrial fibrillation    Benign essential hypertension  Comments:  weakness    Hypothyroidism, unspecified type

## 2018-02-21 ENCOUNTER — TELEPHONE (OUTPATIENT)
Dept: FAMILY MEDICINE CLINIC | Facility: CLINIC | Age: 68
End: 2018-02-21

## 2018-02-21 NOTE — TELEPHONE ENCOUNTER
Pt went to the ER yesterday and a;ll the test was negative. The ER doctor wanted him to talk to his PCP about anxiety medication. Pt is wanting to know if you will be able to give him a different anxiety medication

## 2018-02-23 ENCOUNTER — OFFICE VISIT (OUTPATIENT)
Dept: FAMILY MEDICINE CLINIC | Facility: CLINIC | Age: 68
End: 2018-02-23

## 2018-02-23 VITALS
HEIGHT: 74 IN | TEMPERATURE: 97.2 F | OXYGEN SATURATION: 99 % | SYSTOLIC BLOOD PRESSURE: 112 MMHG | HEART RATE: 89 BPM | DIASTOLIC BLOOD PRESSURE: 70 MMHG | RESPIRATION RATE: 16 BRPM | WEIGHT: 281 LBS | BODY MASS INDEX: 36.06 KG/M2

## 2018-02-23 DIAGNOSIS — F41.9 ANXIETY: ICD-10-CM

## 2018-02-23 DIAGNOSIS — I10 BENIGN ESSENTIAL HYPERTENSION: Primary | ICD-10-CM

## 2018-02-23 PROCEDURE — 99214 OFFICE O/P EST MOD 30 MIN: CPT | Performed by: FAMILY MEDICINE

## 2018-02-23 RX ORDER — BUPROPION HYDROCHLORIDE 100 MG/1
100 TABLET ORAL 2 TIMES DAILY
Qty: 60 TABLET | Refills: 1 | Status: SHIPPED | OUTPATIENT
Start: 2018-02-23 | End: 2018-04-18

## 2018-02-23 NOTE — PATIENT INSTRUCTIONS
Exercise 30 minutes most days of the week  Sleep 6-8 hours each night if possible  Low fat, low cholesterol diet   we discussed prescribed medications and how to take them   make sure you get results of any labs/studies ordered today  Low glycemic index diet     Go to the couch if any problems   Call #  Call me 1 week

## 2018-02-23 NOTE — PROGRESS NOTES
Subjective   oTny Max is a 67 y.o. male.     History of Present Illness   Chief Complaint:   Chief Complaint   Patient presents with   • Hypertension   • Anxiety       Tony Max 67 y.o. male who presents today to follow up on anxiety and hypertension.  He did not bring in a list of his medications or a list of his blood pressures as requested. He went to [facility] ER on 02/20/18. I reviewed labs, notes and test results.  bp good   132/70    Off lisinopril,  On toprol xl  25mg daily   And on ativan .5mg daily  Will add wellbutrin  100mg bid.  bp at home good. Anxiety good  Reviewed er note  he has a problem list of   Patient Active Problem List   Diagnosis   • JAIDA (generalized anxiety disorder)   • Atrial fibrillation   • Depression   • GERD (gastroesophageal reflux disease)   • Heart attack   • HLD (hyperlipidemia)   • Benign essential hypertension   • Hypothyroidism, acquired   • Sleep apnea   • Hypothyroidism   • History of BPH   • Elevated blood sugar   • Seasonal allergies   • Acute maxillary sinusitis   .  Since the last visit, he has overall felt well.  he has been compliant with   Current Outpatient Prescriptions:   •  apixaban (ELIQUIS) 5 MG tablet tablet, Take 1 tablet by mouth 2 (Two) Times a Day., Disp: 60 tablet, Rfl:   •  aspirin 81 MG chewable tablet, Chew 1 tablet Daily., Disp: , Rfl:   •  citalopram (CeleXA) 40 MG tablet, Take 40 mg by mouth Daily., Disp: , Rfl:   •  finasteride (PROSCAR) 5 MG tablet, , Disp: , Rfl:   •  fluticasone (FLONASE) 50 MCG/ACT nasal spray, 2 sprays into each nostril Daily., Disp: 3 bottle, Rfl: 1  •  levothyroxine (SYNTHROID, LEVOTHROID) 200 MCG tablet, Take 1 tablet by mouth Daily., Disp: 90 tablet, Rfl: 1  •  lisinopril (PRINIVIL,ZESTRIL) 5 MG tablet, 2 PO Q AM AND 1 PO Q PM (Patient taking differently: Take 5 mg by mouth Daily. 2 PO Q AM AND 1 PO Q PM), Disp: 90 tablet, Rfl: 2  •  LORazepam (ATIVAN) 0.5 MG tablet, Take 1 tablet by mouth Every 8 (Eight)  "Hours As Needed for Anxiety., Disp: 30 tablet, Rfl: 2  •  meloxicam (MOBIC) 15 MG tablet, , Disp: , Rfl:   •  metoprolol succinate XL (TOPROL-XL) 25 MG 24 hr tablet, Take 25 mg by mouth Daily., Disp: , Rfl:   •  montelukast (SINGULAIR) 10 MG tablet, Take 1 tablet by mouth Every Night., Disp: 90 tablet, Rfl: 1  •  nitroglycerin (NITROSTAT) 0.4 MG SL tablet, Place 0.4 mg under the tongue Every 5 (Five) Minutes As Needed for chest pain. Take no more than 3 doses in 15 minutes., Disp: , Rfl:   •  simvastatin (ZOCOR) 20 MG tablet, Take 20 mg by mouth Every Night., Disp: , Rfl:   •  tamsulosin (FLOMAX) 0.4 MG capsule 24 hr capsule, Take 1 capsule by mouth Every Night., Disp: 90 capsule, Rfl: 1.  he denies medication side effects.    All of the chronic condition(s) listed above are stable w/o issues.    /94  Pulse 89  Temp 97.2 °F (36.2 °C) (Oral)   Resp 16  Ht 188 cm (74.02\")  Wt 127 kg (281 lb)  SpO2 99%  BMI 36.06 kg/m2      The following portions of the patient's history were reviewed and updated as appropriate: allergies, current medications, past family history, past medical history, past social history, past surgical history and problem list.    Review of Systems   Constitutional: Negative for activity change, appetite change, chills, fatigue, fever and unexpected weight change.   HENT: Negative for congestion, ear pain, hearing loss, mouth sores, nosebleeds, rhinorrhea and sore throat.    Eyes: Negative for pain and visual disturbance.   Respiratory: Negative for cough, shortness of breath and wheezing.    Cardiovascular: Negative for chest pain, palpitations and leg swelling.   Gastrointestinal: Negative for abdominal distention, abdominal pain, blood in stool, constipation, diarrhea, nausea and vomiting.   Endocrine: Negative for cold intolerance and heat intolerance.   Genitourinary: Negative for difficulty urinating, discharge, dysuria, frequency, hematuria and urgency.   Musculoskeletal: Negative " for back pain and joint swelling.   Skin: Negative for rash and wound.   Neurological: Negative for dizziness, weakness, numbness and headaches.   Hematological: Does not bruise/bleed easily.   Psychiatric/Behavioral: Negative for confusion, dysphoric mood, sleep disturbance and suicidal ideas. The patient is not nervous/anxious.        Objective   Physical Exam   Constitutional: He is oriented to person, place, and time. He appears well-developed and well-nourished.   HENT:   Head: Atraumatic.   Mouth/Throat: Oropharynx is clear and moist.   Eyes: EOM are normal. Pupils are equal, round, and reactive to light.   Neck: Normal range of motion. Neck supple. No thyromegaly present.   Cardiovascular: Normal rate and regular rhythm.    Pulmonary/Chest: Effort normal and breath sounds normal.   Abdominal: Soft.   Musculoskeletal: Normal range of motion.   Neurological: He is alert and oriented to person, place, and time.   Skin: Skin is warm and dry.   Psychiatric: He has a normal mood and affect. His behavior is normal.   Nursing note and vitals reviewed.      Assessment/Plan   Tony was seen today for hypertension and anxiety.    Diagnoses and all orders for this visit:    Benign essential hypertension    Anxiety    Other orders  -     buPROPion (WELLBUTRIN) 100 MG tablet; Take 1 tablet by mouth 2 (Two) Times a Day.

## 2018-02-26 ENCOUNTER — TELEPHONE (OUTPATIENT)
Dept: FAMILY MEDICINE CLINIC | Facility: CLINIC | Age: 68
End: 2018-02-26

## 2018-02-26 NOTE — TELEPHONE ENCOUNTER
Pt started the wellbutrin on Friday and his anxiety has gotten worse. He is wanting to know what to do. Please advise

## 2018-03-05 ENCOUNTER — OFFICE VISIT (OUTPATIENT)
Dept: RETAIL CLINIC | Facility: CLINIC | Age: 68
End: 2018-03-05

## 2018-03-05 VITALS
DIASTOLIC BLOOD PRESSURE: 87 MMHG | TEMPERATURE: 98.2 F | HEART RATE: 97 BPM | SYSTOLIC BLOOD PRESSURE: 125 MMHG | RESPIRATION RATE: 18 BRPM | OXYGEN SATURATION: 98 %

## 2018-03-05 DIAGNOSIS — J01.90 ACUTE SINUSITIS, RECURRENCE NOT SPECIFIED, UNSPECIFIED LOCATION: Primary | ICD-10-CM

## 2018-03-05 PROCEDURE — 99213 OFFICE O/P EST LOW 20 MIN: CPT | Performed by: NURSE PRACTITIONER

## 2018-03-05 RX ORDER — GUAIFENESIN 600 MG/1
600 TABLET, EXTENDED RELEASE ORAL 2 TIMES DAILY
Qty: 28 TABLET | Refills: 0 | Status: SHIPPED | OUTPATIENT
Start: 2018-03-05 | End: 2018-03-19

## 2018-03-05 RX ORDER — DOXYCYCLINE 100 MG/1
100 CAPSULE ORAL 2 TIMES DAILY
Qty: 10 CAPSULE | Refills: 0 | Status: SHIPPED | OUTPATIENT
Start: 2018-03-05 | End: 2018-03-10

## 2018-03-05 NOTE — PROGRESS NOTES
Subjective:     Tony Max is a 67 y.o.     Sinusitis   This is a new problem. The current episode started in the past 7 days. There has been no fever. Associated symptoms include congestion, ear pain (pressure in rt ear), headaches (mild) and sinus pressure. Pertinent negatives include no chills, coughing, neck pain, shortness of breath or sore throat. Past treatments include saline sprays. The treatment provided mild relief.         The following portions of the patient's history were reviewed and updated as appropriate: allergies, current medications, past family history, past medical history, past social history, past surgical history and problem list.      Review of Systems   Constitutional: Negative for chills, fatigue and fever.   HENT: Positive for congestion, ear pain (pressure in rt ear) and sinus pressure. Negative for sore throat.    Respiratory: Negative for cough, shortness of breath and wheezing.    Cardiovascular:        Hx:afib   Gastrointestinal: Negative for nausea and vomiting.   Musculoskeletal: Negative for myalgias and neck pain.   Skin: Negative for color change and rash.   Neurological: Positive for light-headedness (mild) and headaches (mild).         Objective:      Physical Exam   Constitutional: He is oriented to person, place, and time. He appears well-developed and well-nourished. He is cooperative.   HENT:   Head: Normocephalic and atraumatic.   Right Ear: Ear canal normal. A middle ear effusion (mild serous effusion) is present.   Left Ear: Ear canal normal. Left ear middle ear effusion: mild serous effusion.   Nose: Right sinus exhibits maxillary sinus tenderness and frontal sinus tenderness. Left sinus exhibits maxillary sinus tenderness and frontal sinus tenderness.   Mouth/Throat: No oropharyngeal exudate.   Mild purulent nasal congestion   Eyes: EOM and lids are normal. Pupils are equal, round, and reactive to light.   Neck: Normal range of motion. Neck supple.    Cardiovascular: A regularly irregular rhythm present.   HX: AFIB   Pulmonary/Chest: Effort normal and breath sounds normal.   Abdominal: Soft. Normal appearance and bowel sounds are normal.   Musculoskeletal: Normal range of motion.   Lymphadenopathy:     He has no cervical adenopathy.   Neurological: He is alert and oriented to person, place, and time.   Skin: Skin is warm, dry and intact.   Psychiatric: He has a normal mood and affect. His speech is normal and behavior is normal. Thought content normal.   Vitals reviewed.          Diagnoses and all orders for this visit:    Acute sinusitis, recurrence not specified, unspecified location    Other orders  -     guaiFENesin (MUCINEX) 600 MG 12 hr tablet; Take 1 tablet by mouth 2 (Two) Times a Day for 14 days.  -     doxycycline (MONODOX) 100 MG capsule; Take 1 capsule by mouth 2 (Two) Times a Day for 5 days.  If symptoms worsen or persist follow up with primary care provider.

## 2018-03-05 NOTE — PROGRESS NOTES
Subjective:     Tony Max is a 67 y.o.     HPI      The following portions of the patient's history were reviewed and updated as appropriate: allergies, current medications, past family history, past medical history, past social history, past surgical history and problem list.      Review of Systems      Objective:      Physical Exam        There are no diagnoses linked to this encounter.

## 2018-03-05 NOTE — PATIENT INSTRUCTIONS
Sinusitis, Adult  Sinusitis is soreness and inflammation of your sinuses. Sinuses are hollow spaces in the bones around your face. They are located:  · Around your eyes.  · In the middle of your forehead.  · Behind your nose.  · In your cheekbones.  Your sinuses and nasal passages are lined with a stringy fluid (mucus). Mucus normally drains out of your sinuses. When your nasal tissues get inflamed or swollen, the mucus can get trapped or blocked so air cannot flow through your sinuses. This lets bacteria, viruses, and funguses grow, and that leads to infection.  Follow these instructions at home:  Medicines   · Take, use, or apply over-the-counter and prescription medicines only as told by your doctor. These may include nasal sprays.  · If you were prescribed an antibiotic medicine, take it as told by your doctor. Do not stop taking the antibiotic even if you start to feel better.  Hydrate and Humidify   · Drink enough water to keep your pee (urine) clear or pale yellow.  · Use a cool mist humidifier to keep the humidity level in your home above 50%.  · Breathe in steam for 10-15 minutes, 3-4 times a day or as told by your doctor. You can do this in the bathroom while a hot shower is running.  · Try not to spend time in cool or dry air.  Rest   · Rest as much as possible.  · Sleep with your head raised (elevated).  · Make sure to get enough sleep each night.  General instructions   · Put a warm, moist washcloth on your face 3-4 times a day or as told by your doctor. This will help with discomfort.  · Wash your hands often with soap and water. If there is no soap and water, use hand .  · Do not smoke. Avoid being around people who are smoking (secondhand smoke).  · Keep all follow-up visits as told by your doctor. This is important.  Contact a doctor if:  · You have a fever.  · Your symptoms get worse.  · Your symptoms do not get better within 10 days.  Get help right away if:  · You have a very bad  headache.  · You cannot stop throwing up (vomiting).  · You have pain or swelling around your face or eyes.  · You have trouble seeing.  · You feel confused.  · Your neck is stiff.  · You have trouble breathing.  This information is not intended to replace advice given to you by your health care provider. Make sure you discuss any questions you have with your health care provider.  Document Released: 06/05/2009 Document Revised: 08/13/2017 Document Reviewed: 10/12/2016  Elsevier Interactive Patient Education © 2017 [employer]

## 2018-03-12 DIAGNOSIS — Z87.438 HISTORY OF BPH: ICD-10-CM

## 2018-03-12 RX ORDER — TAMSULOSIN HYDROCHLORIDE 0.4 MG/1
CAPSULE ORAL
Qty: 90 CAPSULE | Refills: 0 | Status: SHIPPED | OUTPATIENT
Start: 2018-03-12 | End: 2018-04-18 | Stop reason: SDUPTHER

## 2018-04-05 DIAGNOSIS — E03.9 HYPOTHYROIDISM, UNSPECIFIED TYPE: ICD-10-CM

## 2018-04-05 DIAGNOSIS — J30.9 ALLERGIC RHINITIS: ICD-10-CM

## 2018-04-06 RX ORDER — LEVOTHYROXINE SODIUM 0.2 MG/1
TABLET ORAL
Qty: 90 TABLET | Refills: 1 | Status: SHIPPED | OUTPATIENT
Start: 2018-04-06 | End: 2018-10-02 | Stop reason: SDUPTHER

## 2018-04-06 RX ORDER — MONTELUKAST SODIUM 10 MG/1
TABLET ORAL
Qty: 90 TABLET | Refills: 1 | Status: SHIPPED | OUTPATIENT
Start: 2018-04-06 | End: 2018-10-07 | Stop reason: SDUPTHER

## 2018-04-18 ENCOUNTER — OFFICE VISIT (OUTPATIENT)
Dept: FAMILY MEDICINE CLINIC | Facility: CLINIC | Age: 68
End: 2018-04-18

## 2018-04-18 VITALS
DIASTOLIC BLOOD PRESSURE: 95 MMHG | HEIGHT: 74 IN | RESPIRATION RATE: 16 BRPM | WEIGHT: 290 LBS | SYSTOLIC BLOOD PRESSURE: 148 MMHG | OXYGEN SATURATION: 94 % | TEMPERATURE: 97.9 F | HEART RATE: 83 BPM | BODY MASS INDEX: 37.22 KG/M2

## 2018-04-18 DIAGNOSIS — E03.9 HYPOTHYROIDISM, ACQUIRED: ICD-10-CM

## 2018-04-18 DIAGNOSIS — I10 BENIGN ESSENTIAL HYPERTENSION: Primary | ICD-10-CM

## 2018-04-18 DIAGNOSIS — J30.2 SEASONAL ALLERGIC RHINITIS, UNSPECIFIED CHRONICITY, UNSPECIFIED TRIGGER: ICD-10-CM

## 2018-04-18 DIAGNOSIS — Z87.438 HISTORY OF BPH: ICD-10-CM

## 2018-04-18 DIAGNOSIS — E78.2 MIXED HYPERLIPIDEMIA: ICD-10-CM

## 2018-04-18 DIAGNOSIS — F32.A DEPRESSION, UNSPECIFIED DEPRESSION TYPE: ICD-10-CM

## 2018-04-18 PROCEDURE — 99214 OFFICE O/P EST MOD 30 MIN: CPT | Performed by: FAMILY MEDICINE

## 2018-04-18 RX ORDER — TAMSULOSIN HYDROCHLORIDE 0.4 MG/1
1 CAPSULE ORAL DAILY
Qty: 90 CAPSULE | Refills: 0 | Status: SHIPPED | OUTPATIENT
Start: 2018-04-18 | End: 2019-05-24 | Stop reason: ALTCHOICE

## 2018-04-18 RX ORDER — ESCITALOPRAM OXALATE 10 MG/1
TABLET ORAL
COMMUNITY
Start: 2018-04-10 | End: 2019-02-26

## 2018-04-18 NOTE — PROGRESS NOTES
Subjective   Tony Max is a 67 y.o. male.     History of Present Illness   Chief Complaint:   Chief Complaint   Patient presents with   • Hypertension   • Anxiety   • Hypothyroidism   • Allergic Rhinitis   • Benign Prostatic Hypertrophy       Tony Max 67 y.o. male who presents today for Medical Management of the below listed issues and medication refills.  he has a problem list of   Patient Active Problem List   Diagnosis   • JAIDA (generalized anxiety disorder)   • Atrial fibrillation   • Depression   • GERD (gastroesophageal reflux disease)   • Heart attack   • HLD (hyperlipidemia)   • Benign essential hypertension   • Hypothyroidism, acquired   • Sleep apnea   • Hypothyroidism   • History of BPH   • Elevated blood sugar   • Seasonal allergies   • Acute maxillary sinusitis   .  Since the last visit, he has overall felt well.  he has been compliant with   Current Outpatient Prescriptions:   •  apixaban (ELIQUIS) 5 MG tablet tablet, Take 1 tablet by mouth 2 (Two) Times a Day., Disp: 60 tablet, Rfl:   •  aspirin 81 MG chewable tablet, Chew 1 tablet Daily., Disp: , Rfl:   •  buPROPion (WELLBUTRIN) 100 MG tablet, Take 1 tablet by mouth 2 (Two) Times a Day., Disp: 60 tablet, Rfl: 1  •  finasteride (PROSCAR) 5 MG tablet, , Disp: , Rfl:   •  fluticasone (FLONASE) 50 MCG/ACT nasal spray, 2 sprays into each nostril Daily., Disp: 3 bottle, Rfl: 1  •  levothyroxine (SYNTHROID, LEVOTHROID) 200 MCG tablet, TAKE ONE TABLET BY MOUTH ONCE DAILY, Disp: 90 tablet, Rfl: 1  •  LORazepam (ATIVAN) 0.5 MG tablet, Take 1 tablet by mouth Every 8 (Eight) Hours As Needed for Anxiety., Disp: 30 tablet, Rfl: 2  •  metoprolol succinate XL (TOPROL-XL) 25 MG 24 hr tablet, Take 25 mg by mouth Daily., Disp: , Rfl:   •  montelukast (SINGULAIR) 10 MG tablet, TAKE ONE TABLET BY MOUTH ONCE DAILY IN THE EVENING, Disp: 90 tablet, Rfl: 1  •  nitroglycerin (NITROSTAT) 0.4 MG SL tablet, Place 0.4 mg under the tongue Every 5 (Five) Minutes As Needed  "for chest pain. Take no more than 3 doses in 15 minutes., Disp: , Rfl:   •  simvastatin (ZOCOR) 20 MG tablet, Take 20 mg by mouth Every Night., Disp: , Rfl:   •  tamsulosin (FLOMAX) 0.4 MG capsule 24 hr capsule, TAKE ONE CAPSULE BY MOUTH IN THE EVENING, Disp: 90 capsule, Rfl: 0.  he denies medication side effects.    All of the chronic condition(s) listed above are stable w/o issues.    /87   Pulse 83   Temp 97.9 °F (36.6 °C) (Oral)   Resp 16   Ht 188 cm (74.02\")   Wt 132 kg (290 lb)   SpO2 94%   BMI 37.21 kg/m²     The following portions of the patient's history were reviewed and updated as appropriate: allergies, current medications, past family history, past medical history, past social history, past surgical history and problem list.    Review of Systems   Constitutional: Negative for activity change, appetite change and unexpected weight change.   Eyes: Negative for visual disturbance.   Respiratory: Negative for chest tightness and shortness of breath.    Cardiovascular: Negative for chest pain and palpitations.   Endocrine: Negative for cold intolerance and heat intolerance.   Skin: Negative for color change.   Neurological: Negative for tremors, syncope and speech difficulty.   Psychiatric/Behavioral: Negative for behavioral problems, confusion and decreased concentration.       Objective   Physical Exam   Constitutional: He is oriented to person, place, and time. He appears well-developed.   HENT:   Head: Normocephalic and atraumatic.   Mouth/Throat: Oropharynx is clear and moist.   Eyes: EOM are normal. Pupils are equal, round, and reactive to light.   Neck: Neck supple.   Cardiovascular:   A FIB  RATE GOOD   Pulmonary/Chest: Effort normal and breath sounds normal.   Abdominal: Soft. Bowel sounds are normal.   Musculoskeletal: Normal range of motion.   Neurological: He is alert and oriented to person, place, and time.   Skin: Skin is warm and dry.   Psychiatric: He has a normal mood and " affect. His behavior is normal.   Nursing note and vitals reviewed.      Assessment/Plan   Tony was seen today for hypertension, anxiety, hypothyroidism, allergic rhinitis and benign prostatic hypertrophy.    Diagnoses and all orders for this visit:    Benign essential hypertension  -     Comprehensive Metabolic Panel; Future  -     Lipid Panel; Future    History of BPH  -     tamsulosin (FLOMAX) 0.4 MG capsule 24 hr capsule; Take 1 capsule by mouth Daily.  -     Comprehensive Metabolic Panel; Future  -     Lipid Panel; Future    Mixed hyperlipidemia  -     Comprehensive Metabolic Panel; Future  -     Lipid Panel; Future    Seasonal allergic rhinitis, unspecified chronicity, unspecified trigger  -     Comprehensive Metabolic Panel; Future  -     Lipid Panel; Future    Hypothyroidism, acquired  -     Comprehensive Metabolic Panel; Future  -     Lipid Panel; Future    Depression, unspecified depression type  -     Comprehensive Metabolic Panel; Future  -     Lipid Panel; Future

## 2018-05-26 ENCOUNTER — OFFICE VISIT (OUTPATIENT)
Dept: RETAIL CLINIC | Facility: CLINIC | Age: 68
End: 2018-05-26

## 2018-05-26 VITALS
SYSTOLIC BLOOD PRESSURE: 126 MMHG | WEIGHT: 293 LBS | OXYGEN SATURATION: 97 % | DIASTOLIC BLOOD PRESSURE: 76 MMHG | HEART RATE: 86 BPM | TEMPERATURE: 98.1 F | RESPIRATION RATE: 18 BRPM | BODY MASS INDEX: 37.6 KG/M2

## 2018-05-26 DIAGNOSIS — J06.9 ACUTE URI: Primary | ICD-10-CM

## 2018-05-26 PROCEDURE — 99213 OFFICE O/P EST LOW 20 MIN: CPT | Performed by: NURSE PRACTITIONER

## 2018-05-26 RX ORDER — AMOXICILLIN 875 MG/1
875 TABLET, COATED ORAL 2 TIMES DAILY
Qty: 20 TABLET | Refills: 0 | Status: SHIPPED | OUTPATIENT
Start: 2018-05-26 | End: 2018-06-01

## 2018-05-26 NOTE — PROGRESS NOTES
Subjective   Tony Max is a 67 y.o. male.     URI    This is a new problem. The current episode started in the past 7 days. The problem has been gradually worsening. There has been no fever. Associated symptoms include congestion and coughing. Pertinent negatives include no chest pain, headaches, nausea, sinus pain, sneezing, sore throat, vomiting or wheezing. He has tried antihistamine and decongestant for the symptoms. The treatment provided no relief.        The following portions of the patient's history were reviewed and updated as appropriate: current medications, past family history, past medical history, past social history, past surgical history and problem list.    Review of Systems   Constitutional: Positive for chills and fatigue. Negative for fever.   HENT: Positive for congestion. Negative for sneezing and sore throat.    Eyes: Negative.    Respiratory: Positive for cough. Negative for chest tightness, shortness of breath and wheezing.    Cardiovascular: Negative for chest pain, palpitations and leg swelling.        Hx afib   Gastrointestinal: Negative for nausea and vomiting.   Endocrine: Negative.    Genitourinary: Negative.    Musculoskeletal: Positive for arthralgias.   Skin: Negative.    Allergic/Immunologic: Positive for environmental allergies.   Neurological: Negative.    Hematological: Negative.    Psychiatric/Behavioral: Negative for agitation, sleep disturbance, suicidal ideas, depressed mood and stress.       Objective   Physical Exam   Constitutional: He is oriented to person, place, and time. He appears well-developed and well-nourished. He is obese.  HENT:   Head: Normocephalic and atraumatic.   Eyes: Conjunctivae and EOM are normal. Pupils are equal, round, and reactive to light.   Neck: Normal range of motion.   Cardiovascular: Normal pulses.  An irregular rhythm present.   Pulmonary/Chest: Effort normal and breath sounds normal.   Abdominal: Soft. Bowel sounds are normal.  "  Musculoskeletal: Normal range of motion.   Neurological: He is alert and oriented to person, place, and time.         Assessment/Plan   Tony was seen today for uri.    Diagnoses and all orders for this visit:    Acute URI  -     amoxicillin (AMOXIL) 875 MG tablet; Take 1 tablet by mouth 2 (Two) Times a Day for 10 days.          Upper Respiratory Infection, Adult  Most upper respiratory infections (URIs) are a viral infection of the air passages leading to the lungs. A URI affects the nose, throat, and upper air passages. The most common type of URI is nasopharyngitis and is typically referred to as \"the common cold.\"  URIs run their course and usually go away on their own. Most of the time, a URI does not require medical attention, but sometimes a bacterial infection in the upper airways can follow a viral infection. This is called a secondary infection. Sinus and middle ear infections are common types of secondary upper respiratory infections.  Bacterial pneumonia can also complicate a URI. A URI can worsen asthma and chronic obstructive pulmonary disease (COPD). Sometimes, these complications can require emergency medical care and may be life threatening.  What are the causes?  Almost all URIs are caused by viruses. A virus is a type of germ and can spread from one person to another.  What increases the risk?  You may be at risk for a URI if:  · You smoke.  · You have chronic heart or lung disease.  · You have a weakened defense (immune) system.  · You are very young or very old.  · You have nasal allergies or asthma.  · You work in crowded or poorly ventilated areas.  · You work in health care facilities or schools.  What are the signs or symptoms?  Symptoms typically develop 2-3 days after you come in contact with a cold virus. Most viral URIs last 7-10 days. However, viral URIs from the influenza virus (flu virus) can last 14-18 days and are typically more severe. Symptoms may include:  · Runny or stuffy " (congested) nose.  · Sneezing.  · Cough.  · Sore throat.  · Headache.  · Fatigue.  · Fever.  · Loss of appetite.  · Pain in your forehead, behind your eyes, and over your cheekbones (sinus pain).  · Muscle aches.  How is this diagnosed?  Your health care provider may diagnose a URI by:  · Physical exam.  · Tests to check that your symptoms are not due to another condition such as:  ¨ Strep throat.  ¨ Sinusitis.  ¨ Pneumonia.  ¨ Asthma.  How is this treated?  A URI goes away on its own with time. It cannot be cured with medicines, but medicines may be prescribed or recommended to relieve symptoms. Medicines may help:  · Reduce your fever.  · Reduce your cough.  · Relieve nasal congestion.  Follow these instructions at home:  · Take medicines only as directed by your health care provider.  · Gargle warm saltwater or take cough drops to comfort your throat as directed by your health care provider.  · Use a warm mist humidifier or inhale steam from a shower to increase air moisture. This may make it easier to breathe.  · Drink enough fluid to keep your urine clear or pale yellow.  · Eat soups and other clear broths and maintain good nutrition.  · Rest as needed.  · Return to work when your temperature has returned to normal or as your health care provider advises. You may need to stay home longer to avoid infecting others. You can also use a face mask and careful hand washing to prevent spread of the virus.  · Increase the usage of your inhaler if you have asthma.  · Do not use any tobacco products, including cigarettes, chewing tobacco, or electronic cigarettes. If you need help quitting, ask your health care provider.  How is this prevented?  The best way to protect yourself from getting a cold is to practice good hygiene.  · Avoid oral or hand contact with people with cold symptoms.  · Wash your hands often if contact occurs.  There is no clear evidence that vitamin C, vitamin E, echinacea, or exercise reduces the  chance of developing a cold. However, it is always recommended to get plenty of rest, exercise, and practice good nutrition.  Contact a health care provider if:  · You are getting worse rather than better.  · Your symptoms are not controlled by medicine.  · You have chills.  · You have worsening shortness of breath.  · You have brown or red mucus.  · You have yellow or brown nasal discharge.  · You have pain in your face, especially when you bend forward.  · You have a fever.  · You have swollen neck glands.  · You have pain while swallowing.  · You have white areas in the back of your throat.  Get help right away if:  · You have severe or persistent:  ¨ Headache.  ¨ Ear pain.  ¨ Sinus pain.  ¨ Chest pain.  · You have chronic lung disease and any of the following:  ¨ Wheezing.  ¨ Prolonged cough.  ¨ Coughing up blood.  ¨ A change in your usual mucus.  · You have a stiff neck.  · You have changes in your:  ¨ Vision.  ¨ Hearing.  ¨ Thinking.  ¨ Mood.  This information is not intended to replace advice given to you by your health care provider. Make sure you discuss any questions you have with your health care provider.  Document Released: 06/13/2002 Document Revised: 08/20/2017 Document Reviewed: 03/25/2015  Elsevier Interactive Patient Education © 2017 [employer]

## 2018-05-26 NOTE — PATIENT INSTRUCTIONS
"Upper Respiratory Infection, Adult  Most upper respiratory infections (URIs) are a viral infection of the air passages leading to the lungs. A URI affects the nose, throat, and upper air passages. The most common type of URI is nasopharyngitis and is typically referred to as \"the common cold.\"  URIs run their course and usually go away on their own. Most of the time, a URI does not require medical attention, but sometimes a bacterial infection in the upper airways can follow a viral infection. This is called a secondary infection. Sinus and middle ear infections are common types of secondary upper respiratory infections.  Bacterial pneumonia can also complicate a URI. A URI can worsen asthma and chronic obstructive pulmonary disease (COPD). Sometimes, these complications can require emergency medical care and may be life threatening.  What are the causes?  Almost all URIs are caused by viruses. A virus is a type of germ and can spread from one person to another.  What increases the risk?  You may be at risk for a URI if:  · You smoke.  · You have chronic heart or lung disease.  · You have a weakened defense (immune) system.  · You are very young or very old.  · You have nasal allergies or asthma.  · You work in crowded or poorly ventilated areas.  · You work in health care facilities or schools.  What are the signs or symptoms?  Symptoms typically develop 2-3 days after you come in contact with a cold virus. Most viral URIs last 7-10 days. However, viral URIs from the influenza virus (flu virus) can last 14-18 days and are typically more severe. Symptoms may include:  · Runny or stuffy (congested) nose.  · Sneezing.  · Cough.  · Sore throat.  · Headache.  · Fatigue.  · Fever.  · Loss of appetite.  · Pain in your forehead, behind your eyes, and over your cheekbones (sinus pain).  · Muscle aches.  How is this diagnosed?  Your health care provider may diagnose a URI by:  · Physical exam.  · Tests to check that your " symptoms are not due to another condition such as:  ¨ Strep throat.  ¨ Sinusitis.  ¨ Pneumonia.  ¨ Asthma.  How is this treated?  A URI goes away on its own with time. It cannot be cured with medicines, but medicines may be prescribed or recommended to relieve symptoms. Medicines may help:  · Reduce your fever.  · Reduce your cough.  · Relieve nasal congestion.  Follow these instructions at home:  · Take medicines only as directed by your health care provider.  · Gargle warm saltwater or take cough drops to comfort your throat as directed by your health care provider.  · Use a warm mist humidifier or inhale steam from a shower to increase air moisture. This may make it easier to breathe.  · Drink enough fluid to keep your urine clear or pale yellow.  · Eat soups and other clear broths and maintain good nutrition.  · Rest as needed.  · Return to work when your temperature has returned to normal or as your health care provider advises. You may need to stay home longer to avoid infecting others. You can also use a face mask and careful hand washing to prevent spread of the virus.  · Increase the usage of your inhaler if you have asthma.  · Do not use any tobacco products, including cigarettes, chewing tobacco, or electronic cigarettes. If you need help quitting, ask your health care provider.  How is this prevented?  The best way to protect yourself from getting a cold is to practice good hygiene.  · Avoid oral or hand contact with people with cold symptoms.  · Wash your hands often if contact occurs.  There is no clear evidence that vitamin C, vitamin E, echinacea, or exercise reduces the chance of developing a cold. However, it is always recommended to get plenty of rest, exercise, and practice good nutrition.  Contact a health care provider if:  · You are getting worse rather than better.  · Your symptoms are not controlled by medicine.  · You have chills.  · You have worsening shortness of breath.  · You have brown  or red mucus.  · You have yellow or brown nasal discharge.  · You have pain in your face, especially when you bend forward.  · You have a fever.  · You have swollen neck glands.  · You have pain while swallowing.  · You have white areas in the back of your throat.  Get help right away if:  · You have severe or persistent:  ¨ Headache.  ¨ Ear pain.  ¨ Sinus pain.  ¨ Chest pain.  · You have chronic lung disease and any of the following:  ¨ Wheezing.  ¨ Prolonged cough.  ¨ Coughing up blood.  ¨ A change in your usual mucus.  · You have a stiff neck.  · You have changes in your:  ¨ Vision.  ¨ Hearing.  ¨ Thinking.  ¨ Mood.  This information is not intended to replace advice given to you by your health care provider. Make sure you discuss any questions you have with your health care provider.  Document Released: 06/13/2002 Document Revised: 08/20/2017 Document Reviewed: 03/25/2015  [employer] Interactive Patient Education © 2017 [employer]

## 2018-06-07 ENCOUNTER — OFFICE VISIT (OUTPATIENT)
Dept: FAMILY MEDICINE CLINIC | Facility: CLINIC | Age: 68
End: 2018-06-07

## 2018-06-07 VITALS
DIASTOLIC BLOOD PRESSURE: 74 MMHG | RESPIRATION RATE: 16 BRPM | BODY MASS INDEX: 36.96 KG/M2 | SYSTOLIC BLOOD PRESSURE: 120 MMHG | HEIGHT: 74 IN | WEIGHT: 288 LBS | TEMPERATURE: 97.8 F | HEART RATE: 84 BPM | OXYGEN SATURATION: 94 %

## 2018-06-07 DIAGNOSIS — R05.9 COUGH: ICD-10-CM

## 2018-06-07 DIAGNOSIS — R91.1 LUNG NODULE SEEN ON IMAGING STUDY: ICD-10-CM

## 2018-06-07 DIAGNOSIS — Z72.0 TOBACCO ABUSE: ICD-10-CM

## 2018-06-07 DIAGNOSIS — J20.9 ACUTE BRONCHITIS DUE TO INFECTION: Primary | ICD-10-CM

## 2018-06-07 DIAGNOSIS — J06.9 ACUTE URI: ICD-10-CM

## 2018-06-07 PROCEDURE — 99213 OFFICE O/P EST LOW 20 MIN: CPT | Performed by: PHYSICIAN ASSISTANT

## 2018-06-07 PROCEDURE — 71046 X-RAY EXAM CHEST 2 VIEWS: CPT | Performed by: PHYSICIAN ASSISTANT

## 2018-06-07 RX ORDER — ALBUTEROL SULFATE 90 UG/1
2 AEROSOL, METERED RESPIRATORY (INHALATION) EVERY 4 HOURS PRN
Qty: 1 INHALER | Refills: 11 | Status: SHIPPED | OUTPATIENT
Start: 2018-06-07 | End: 2019-05-24 | Stop reason: SDUPTHER

## 2018-06-07 RX ORDER — PREDNISONE 20 MG/1
TABLET ORAL
Qty: 20 TABLET | Refills: 0 | Status: SHIPPED | OUTPATIENT
Start: 2018-06-07 | End: 2018-08-01

## 2018-06-07 RX ORDER — AMOXICILLIN AND CLAVULANATE POTASSIUM 875; 125 MG/1; MG/1
1 TABLET, FILM COATED ORAL EVERY 12 HOURS SCHEDULED
Qty: 20 TABLET | Refills: 0 | Status: SHIPPED | OUTPATIENT
Start: 2018-06-07 | End: 2018-06-07 | Stop reason: SDUPTHER

## 2018-06-07 RX ORDER — AMOXICILLIN AND CLAVULANATE POTASSIUM 875; 125 MG/1; MG/1
1 TABLET, FILM COATED ORAL EVERY 12 HOURS SCHEDULED
Qty: 20 TABLET | Refills: 0 | Status: SHIPPED | OUTPATIENT
Start: 2018-06-07 | End: 2018-06-16

## 2018-06-07 NOTE — PATIENT INSTRUCTIONS
For throat pain:  Can gargle with 1/2 Maalox and 1/2 Benadryl liquid ---mix, gargle and spit Take Tylenol for fever or aches  Rest as needed  Call not better in 7 days  Increase fluids  Call if worse  Can use generic Afrin nasal spray up to 5 days for nasal congestion  Finish antibiotic course of therapy  Stop smoking\

## 2018-06-13 ENCOUNTER — HOSPITAL ENCOUNTER (OUTPATIENT)
Dept: CT IMAGING | Facility: HOSPITAL | Age: 68
Discharge: HOME | End: 2018-06-13
Admitting: PHYSICIAN ASSISTANT

## 2018-06-13 DIAGNOSIS — Z72.0 TOBACCO ABUSE: ICD-10-CM

## 2018-06-13 DIAGNOSIS — R91.1 LUNG NODULE SEEN ON IMAGING STUDY: ICD-10-CM

## 2018-06-13 DIAGNOSIS — R05.9 COUGH: ICD-10-CM

## 2018-06-13 PROCEDURE — 71250 CT THORAX DX C-: CPT

## 2018-06-17 DIAGNOSIS — Z87.438 HISTORY OF BPH: ICD-10-CM

## 2018-06-18 RX ORDER — TAMSULOSIN HYDROCHLORIDE 0.4 MG/1
CAPSULE ORAL
Qty: 90 CAPSULE | Refills: 0 | OUTPATIENT
Start: 2018-06-18

## 2018-07-18 ENCOUNTER — RESULTS ENCOUNTER (OUTPATIENT)
Dept: FAMILY MEDICINE CLINIC | Facility: CLINIC | Age: 68
End: 2018-07-18

## 2018-07-18 DIAGNOSIS — I10 BENIGN ESSENTIAL HYPERTENSION: ICD-10-CM

## 2018-07-18 DIAGNOSIS — F32.A DEPRESSION, UNSPECIFIED DEPRESSION TYPE: ICD-10-CM

## 2018-07-18 DIAGNOSIS — Z87.438 HISTORY OF BPH: ICD-10-CM

## 2018-07-18 DIAGNOSIS — J30.2 SEASONAL ALLERGIC RHINITIS, UNSPECIFIED CHRONICITY, UNSPECIFIED TRIGGER: ICD-10-CM

## 2018-07-18 DIAGNOSIS — E78.2 MIXED HYPERLIPIDEMIA: ICD-10-CM

## 2018-07-18 DIAGNOSIS — E03.9 HYPOTHYROIDISM, ACQUIRED: ICD-10-CM

## 2018-09-04 ENCOUNTER — TRANSCRIBE ORDERS (OUTPATIENT)
Dept: ADMINISTRATIVE | Facility: HOSPITAL | Age: 68
End: 2018-09-04

## 2018-09-04 DIAGNOSIS — N39.0 URINARY TRACT INFECTION WITHOUT HEMATURIA, SITE UNSPECIFIED: Primary | ICD-10-CM

## 2018-09-06 PROBLEM — N39.0 UTI (URINARY TRACT INFECTION): Status: ACTIVE | Noted: 2018-09-06

## 2018-09-06 RX ORDER — LIDOCAINE HYDROCHLORIDE 10 MG/ML
20 INJECTION, SOLUTION INFILTRATION; PERINEURAL DAILY
Status: DISCONTINUED | OUTPATIENT
Start: 2018-09-07 | End: 2018-09-06

## 2018-09-06 RX ORDER — LIDOCAINE HYDROCHLORIDE 10 MG/ML
20 INJECTION, SOLUTION INFILTRATION; PERINEURAL DAILY
Status: DISCONTINUED | OUTPATIENT
Start: 2018-09-07 | End: 2018-09-09 | Stop reason: HOSPADM

## 2018-09-07 ENCOUNTER — HOSPITAL ENCOUNTER (OUTPATIENT)
Dept: INFUSION THERAPY | Facility: HOSPITAL | Age: 68
Discharge: HOME | End: 2018-09-07
Attending: UROLOGY | Admitting: UROLOGY

## 2018-09-07 VITALS
HEART RATE: 80 BPM | OXYGEN SATURATION: 96 % | HEIGHT: 74 IN | SYSTOLIC BLOOD PRESSURE: 131 MMHG | WEIGHT: 290 LBS | RESPIRATION RATE: 18 BRPM | BODY MASS INDEX: 37.22 KG/M2 | DIASTOLIC BLOOD PRESSURE: 87 MMHG | TEMPERATURE: 98.2 F

## 2018-09-07 DIAGNOSIS — N39.0 URINARY TRACT INFECTION WITHOUT HEMATURIA, SITE UNSPECIFIED: ICD-10-CM

## 2018-09-07 PROCEDURE — 25010000002 CEFTRIAXONE PER 250 MG: Performed by: UROLOGY

## 2018-09-07 PROCEDURE — 96372 THER/PROPH/DIAG INJ SC/IM: CPT

## 2018-09-07 RX ORDER — CEFTRIAXONE 1 G/1
1 INJECTION, POWDER, FOR SOLUTION INTRAMUSCULAR; INTRAVENOUS ONCE
Status: COMPLETED | OUTPATIENT
Start: 2018-09-07 | End: 2018-09-07

## 2018-09-07 RX ORDER — CEFTRIAXONE 1 G/1
1 INJECTION, POWDER, FOR SOLUTION INTRAMUSCULAR; INTRAVENOUS ONCE
Status: CANCELLED | OUTPATIENT
Start: 2018-09-07 | End: 2018-09-07

## 2018-09-07 RX ADMIN — CEFTRIAXONE 1 G: 1 INJECTION, POWDER, FOR SOLUTION INTRAMUSCULAR; INTRAVENOUS at 11:30

## 2018-09-07 RX ADMIN — LIDOCAINE HYDROCHLORIDE 2.1 ML: 10 INJECTION, SOLUTION INFILTRATION; PERINEURAL at 11:30

## 2018-09-07 NOTE — PROGRESS NOTES
Pt tolerated injection with no complaints.  Injection site x 1 with band aide applied.  Pt given instructions and appointment time for Saturday & Sunday.  Pt verbalizes understanding.  Pt DC per ambulation @ 3496.

## 2018-09-08 ENCOUNTER — HOSPITAL ENCOUNTER (OUTPATIENT)
Dept: INFUSION THERAPY | Facility: HOSPITAL | Age: 68
Discharge: HOME | End: 2018-09-08
Attending: UROLOGY | Admitting: UROLOGY

## 2018-09-08 VITALS
SYSTOLIC BLOOD PRESSURE: 152 MMHG | TEMPERATURE: 97 F | DIASTOLIC BLOOD PRESSURE: 93 MMHG | HEART RATE: 89 BPM | OXYGEN SATURATION: 95 % | RESPIRATION RATE: 16 BRPM

## 2018-09-08 DIAGNOSIS — N39.0 URINARY TRACT INFECTION WITHOUT HEMATURIA, SITE UNSPECIFIED: ICD-10-CM

## 2018-09-08 PROCEDURE — 96372 THER/PROPH/DIAG INJ SC/IM: CPT

## 2018-09-08 PROCEDURE — 25010000002 CEFTRIAXONE PER 250 MG: Performed by: UROLOGY

## 2018-09-08 RX ORDER — CEFTRIAXONE 1 G/1
1 INJECTION, POWDER, FOR SOLUTION INTRAMUSCULAR; INTRAVENOUS ONCE
Status: CANCELLED | OUTPATIENT
Start: 2018-09-08 | End: 2018-09-08

## 2018-09-08 RX ORDER — CEFTRIAXONE 1 G/1
1 INJECTION, POWDER, FOR SOLUTION INTRAMUSCULAR; INTRAVENOUS ONCE
Status: COMPLETED | OUTPATIENT
Start: 2018-09-08 | End: 2018-09-08

## 2018-09-08 RX ADMIN — CEFTRIAXONE 1 G: 1 INJECTION, POWDER, FOR SOLUTION INTRAMUSCULAR; INTRAVENOUS at 10:13

## 2018-09-09 ENCOUNTER — HOSPITAL ENCOUNTER (OUTPATIENT)
Dept: INFUSION THERAPY | Facility: HOSPITAL | Age: 68
Discharge: HOME | End: 2018-09-09
Attending: UROLOGY | Admitting: UROLOGY

## 2018-09-09 VITALS
DIASTOLIC BLOOD PRESSURE: 99 MMHG | TEMPERATURE: 97.6 F | SYSTOLIC BLOOD PRESSURE: 159 MMHG | OXYGEN SATURATION: 100 % | HEART RATE: 81 BPM | RESPIRATION RATE: 16 BRPM

## 2018-09-09 DIAGNOSIS — N39.0 URINARY TRACT INFECTION WITHOUT HEMATURIA, SITE UNSPECIFIED: ICD-10-CM

## 2018-09-09 PROCEDURE — 96372 THER/PROPH/DIAG INJ SC/IM: CPT

## 2018-09-09 PROCEDURE — 25010000002 CEFTRIAXONE PER 250 MG: Performed by: UROLOGY

## 2018-09-09 RX ORDER — CEFTRIAXONE 1 G/1
1 INJECTION, POWDER, FOR SOLUTION INTRAMUSCULAR; INTRAVENOUS ONCE
Status: COMPLETED | OUTPATIENT
Start: 2018-09-09 | End: 2018-09-09

## 2018-09-09 RX ORDER — CEFTRIAXONE 1 G/1
1 INJECTION, POWDER, FOR SOLUTION INTRAMUSCULAR; INTRAVENOUS ONCE
Status: CANCELLED | OUTPATIENT
Start: 2018-09-09 | End: 2018-09-09

## 2018-09-09 RX ADMIN — CEFTRIAXONE 1 G: 1 INJECTION, POWDER, FOR SOLUTION INTRAMUSCULAR; INTRAVENOUS at 10:31

## 2018-10-02 DIAGNOSIS — E03.9 HYPOTHYROIDISM, UNSPECIFIED TYPE: ICD-10-CM

## 2018-10-02 LAB
ALBUMIN SERPL-MCNC: 4.3 G/DL (ref 3.5–5.2)
ALBUMIN/GLOB SERPL: 1.8 G/DL
ALP SERPL-CCNC: 120 U/L (ref 39–117)
ALT SERPL-CCNC: 25 U/L (ref 1–41)
AST SERPL-CCNC: 22 U/L (ref 1–40)
BILIRUB SERPL-MCNC: 1.9 MG/DL (ref 0.1–1.2)
BUN SERPL-MCNC: 19 MG/DL (ref 8–23)
BUN/CREAT SERPL: 15.8 (ref 7–25)
CALCIUM SERPL-MCNC: 9 MG/DL (ref 8.6–10.5)
CHLORIDE SERPL-SCNC: 106 MMOL/L (ref 98–107)
CHOLEST SERPL-MCNC: 106 MG/DL (ref 0–200)
CO2 SERPL-SCNC: 26.4 MMOL/L (ref 22–29)
CREAT SERPL-MCNC: 1.2 MG/DL (ref 0.76–1.27)
GLOBULIN SER CALC-MCNC: 2.4 GM/DL
GLUCOSE SERPL-MCNC: 127 MG/DL (ref 65–99)
HDLC SERPL-MCNC: 30 MG/DL (ref 40–60)
LDLC SERPL CALC-MCNC: 49 MG/DL (ref 0–100)
POTASSIUM SERPL-SCNC: 4.7 MMOL/L (ref 3.5–5.2)
PROT SERPL-MCNC: 6.7 G/DL (ref 6–8.5)
SODIUM SERPL-SCNC: 143 MMOL/L (ref 136–145)
TRIGL SERPL-MCNC: 133 MG/DL (ref 0–150)
VLDLC SERPL CALC-MCNC: 26.6 MG/DL (ref 5–40)

## 2018-10-02 RX ORDER — LEVOTHYROXINE SODIUM 0.2 MG/1
200 TABLET ORAL DAILY
Qty: 30 TABLET | Refills: 0 | Status: SHIPPED | OUTPATIENT
Start: 2018-10-02 | End: 2018-10-22 | Stop reason: SDUPTHER

## 2018-10-04 LAB
SPECIMEN STATUS: NORMAL
WRITTEN AUTHORIZATION: NORMAL

## 2018-10-07 DIAGNOSIS — J30.9 ALLERGIC RHINITIS: ICD-10-CM

## 2018-10-08 RX ORDER — MONTELUKAST SODIUM 10 MG/1
TABLET ORAL
Qty: 30 TABLET | Refills: 0 | Status: SHIPPED | OUTPATIENT
Start: 2018-10-08 | End: 2018-10-22 | Stop reason: SDUPTHER

## 2018-10-22 ENCOUNTER — OFFICE VISIT (OUTPATIENT)
Dept: FAMILY MEDICINE CLINIC | Facility: CLINIC | Age: 68
End: 2018-10-22

## 2018-10-22 VITALS
OXYGEN SATURATION: 98 % | RESPIRATION RATE: 16 BRPM | HEART RATE: 72 BPM | DIASTOLIC BLOOD PRESSURE: 88 MMHG | BODY MASS INDEX: 38.89 KG/M2 | TEMPERATURE: 97.9 F | WEIGHT: 303 LBS | SYSTOLIC BLOOD PRESSURE: 130 MMHG | HEIGHT: 74 IN

## 2018-10-22 DIAGNOSIS — Z87.438 HISTORY OF BPH: ICD-10-CM

## 2018-10-22 DIAGNOSIS — E66.01 CLASS 2 SEVERE OBESITY DUE TO EXCESS CALORIES WITH SERIOUS COMORBIDITY AND BODY MASS INDEX (BMI) OF 38.0 TO 38.9 IN ADULT (HCC): ICD-10-CM

## 2018-10-22 DIAGNOSIS — R73.01 IFG (IMPAIRED FASTING GLUCOSE): ICD-10-CM

## 2018-10-22 DIAGNOSIS — J30.9 ALLERGIC RHINITIS, UNSPECIFIED SEASONALITY, UNSPECIFIED TRIGGER: ICD-10-CM

## 2018-10-22 DIAGNOSIS — E03.9 HYPOTHYROIDISM, UNSPECIFIED TYPE: Primary | ICD-10-CM

## 2018-10-22 DIAGNOSIS — F41.1 GAD (GENERALIZED ANXIETY DISORDER): ICD-10-CM

## 2018-10-22 PROCEDURE — 99214 OFFICE O/P EST MOD 30 MIN: CPT | Performed by: FAMILY MEDICINE

## 2018-10-22 RX ORDER — LORAZEPAM 0.5 MG/1
0.5 TABLET ORAL EVERY 8 HOURS PRN
Qty: 30 TABLET | Refills: 2 | Status: CANCELLED | OUTPATIENT
Start: 2018-10-22

## 2018-10-22 RX ORDER — MONTELUKAST SODIUM 10 MG/1
10 TABLET ORAL EVERY EVENING
Qty: 90 TABLET | Refills: 1 | Status: SHIPPED | OUTPATIENT
Start: 2018-10-22 | End: 2019-02-26 | Stop reason: SDUPTHER

## 2018-10-22 RX ORDER — TAMSULOSIN HYDROCHLORIDE 0.4 MG/1
1 CAPSULE ORAL DAILY
Qty: 90 CAPSULE | Refills: 1 | Status: CANCELLED | OUTPATIENT
Start: 2018-10-22

## 2018-10-22 RX ORDER — LEVOTHYROXINE SODIUM 0.2 MG/1
200 TABLET ORAL DAILY
Qty: 90 TABLET | Refills: 1 | Status: SHIPPED | OUTPATIENT
Start: 2018-10-22 | End: 2019-02-26 | Stop reason: SDUPTHER

## 2018-10-22 NOTE — PROGRESS NOTES
Subjective   Chief Complaint:   Chief Complaint   Patient presents with   • Hypertension   • Anxiety   • Hypothyroidism   • Hyperlipidemia   • Allergies         History of Present Illness   bp  120/80   and no chest pain with exertion.  We went over in detail to stop smoking cigarettes.  He could try the Nicorette patch about diet and he needs to decrease breads and pastries etc. and lower his weight and his caloric intake.  Blood sugars 127.  His total bilirubin            Tony Max 68 y.o. male who presents today for Medical Management of the below listed issues and medication refills.  He is smoking  cigarettes 1-1/2 pack per day his BMI is 38.9.  He needs to  stop smoking cigarettes  she noted chest pain and ended up having 2 stents  7 years ago.  He is smoking one to one and half pack cigarettes per day.  Try Chantix before stop smoking but it upset his stomach.  I explained to patient he could try the Nicorette patch.  He needs to stop smoking cigarettes now.  I will went through his labs with him.  His BMI is 38.9.  Sugar 127 total bilirubin 1.9 and that is stable and that is the range of some past labs.  See him back in 3 months and hopefully can stop smoking by then and lose weight.    ICD-10-CM ICD-9-CM   1. Hypothyroidism, unspecified type E03.9 244.9   2. JAIDA (generalized anxiety disorder) F41.1 300.02   3. Allergic rhinitis, unspecified seasonality, unspecified trigger J30.9 477.9   4. History of BPH Z87.438 V13.89        he has a problem list of   Patient Active Problem List   Diagnosis   • JAIDA (generalized anxiety disorder)   • Atrial fibrillation (CMS/HCC)   • Depression   • GERD (gastroesophageal reflux disease)   • Heart attack (CMS/HCC)   • HLD (hyperlipidemia)   • Benign essential hypertension   • Hypothyroidism, acquired   • Sleep apnea   • Hypothyroidism   • History of BPH   • Elevated blood sugar   • Seasonal allergies   • Acute maxillary sinusitis   • UTI (urinary tract infection)   .   "Since the last visit, he has overall felt well.  he has been compliant with   Current Outpatient Prescriptions:   •  albuterol (PROVENTIL HFA;VENTOLIN HFA) 108 (90 Base) MCG/ACT inhaler, Inhale 2 puffs Every 4 (Four) Hours As Needed for Wheezing., Disp: 1 inhaler, Rfl: 11  •  apixaban (ELIQUIS) 5 MG tablet tablet, Take 1 tablet by mouth 2 (Two) Times a Day., Disp: 60 tablet, Rfl:   •  aspirin 81 MG chewable tablet, Chew 1 tablet Daily., Disp: , Rfl:   •  escitalopram (LEXAPRO) 10 MG tablet, , Disp: , Rfl:   •  finasteride (PROSCAR) 5 MG tablet, , Disp: , Rfl:   •  fluticasone (FLONASE) 50 MCG/ACT nasal spray, 2 sprays into each nostril Daily., Disp: 3 bottle, Rfl: 1  •  levothyroxine (SYNTHROID, LEVOTHROID) 200 MCG tablet, Take 1 tablet by mouth Daily., Disp: 30 tablet, Rfl: 0  •  LORazepam (ATIVAN) 0.5 MG tablet, Take 1 tablet by mouth Every 8 (Eight) Hours As Needed for Anxiety., Disp: 30 tablet, Rfl: 2  •  metoprolol succinate XL (TOPROL-XL) 25 MG 24 hr tablet, Take 25 mg by mouth Daily., Disp: , Rfl:   •  montelukast (SINGULAIR) 10 MG tablet, TAKE 1 TABLET BY MOUTH ONCE DAILY IN THE EVENING, Disp: 30 tablet, Rfl: 0  •  nitroglycerin (NITROSTAT) 0.4 MG SL tablet, Place 0.4 mg under the tongue Every 5 (Five) Minutes As Needed for chest pain. Take no more than 3 doses in 15 minutes., Disp: , Rfl:   •  simvastatin (ZOCOR) 20 MG tablet, Take 20 mg by mouth Every Night., Disp: , Rfl:   •  tamsulosin (FLOMAX) 0.4 MG capsule 24 hr capsule, Take 1 capsule by mouth Daily., Disp: 90 capsule, Rfl: 0.  he denies medication side effects.    All of the chronic condition(s) listed above are stable w/o issues.    /88   Pulse 72   Temp 97.9 °F (36.6 °C)   Resp 16   Ht 188 cm (74\")   Wt (!) 137 kg (303 lb)   SpO2 98%   BMI 38.90 kg/m²     Results for orders placed or performed in visit on 07/18/18   Comprehensive Metabolic Panel   Result Value Ref Range    Glucose 127 (H) 65 - 99 mg/dL    BUN 19 8 - 23 mg/dL    " Creatinine 1.20 0.76 - 1.27 mg/dL    eGFR Non African Am 60 (L) >60 mL/min/1.73    eGFR African Am 73 >60 mL/min/1.73    BUN/Creatinine Ratio 15.8 7.0 - 25.0    Sodium 143 136 - 145 mmol/L    Potassium 4.7 3.5 - 5.2 mmol/L    Chloride 106 98 - 107 mmol/L    Total CO2 26.4 22.0 - 29.0 mmol/L    Calcium 9.0 8.6 - 10.5 mg/dL    Total Protein 6.7 6.0 - 8.5 g/dL    Albumin 4.30 3.50 - 5.20 g/dL    Globulin 2.4 gm/dL    A/G Ratio 1.8 g/dL    Total Bilirubin 1.9 (H) 0.1 - 1.2 mg/dL    Alkaline Phosphatase 120 (H) 39 - 117 U/L    AST (SGOT) 22 1 - 40 U/L    ALT (SGPT) 25 1 - 41 U/L   Lipid Panel   Result Value Ref Range    Total Cholesterol 106 0 - 200 mg/dL    Triglycerides 133 0 - 150 mg/dL    HDL Cholesterol 30 (L) 40 - 60 mg/dL    VLDL Cholesterol 26.6 5 - 40 mg/dL    LDL Cholesterol  49 0 - 100 mg/dL   Specimen Status Report   Result Value Ref Range    Specimen Status Comment    Written Authorization   Result Value Ref Range    Written Authorization Comment              The following portions of the patient's history were reviewed and updated as appropriate: allergies, current medications, past family history, past medical history, past social history, past surgical history and problem list.    Review of Systems   Constitutional: Negative for activity change, appetite change and unexpected weight change.   Eyes: Negative for visual disturbance.   Respiratory: Negative for chest tightness and shortness of breath.    Cardiovascular: Negative for chest pain and palpitations.   Skin: Negative for color change.   Neurological: Negative for syncope and speech difficulty.   Psychiatric/Behavioral: Negative for confusion and decreased concentration.       Objective   Physical Exam   Constitutional: He is oriented to person, place, and time. He appears well-developed and well-nourished.   HENT:   Head: Atraumatic.   Mouth/Throat: Oropharynx is clear and moist.   Eyes: Pupils are equal, round, and reactive to light. EOM are  normal.   Neck: Normal range of motion. Neck supple. No thyromegaly present.   Cardiovascular: Normal rate and regular rhythm.    Pulmonary/Chest: Effort normal and breath sounds normal.   Abdominal: Soft. There is no tenderness. There is no guarding.   Genitourinary:   Genitourinary Comments: State exam as per urologist.   Musculoskeletal: Normal range of motion.   Neurological: He is alert and oriented to person, place, and time.   Skin: Skin is warm and dry.   Psychiatric: He has a normal mood and affect. His behavior is normal.   Nursing note and vitals reviewed.      Assessment/Plan   Tony was seen today for hypertension, anxiety, hypothyroidism, hyperlipidemia and allergies.    Diagnoses and all orders for this visit:    Hypothyroidism, unspecified type  -     levothyroxine (SYNTHROID, LEVOTHROID) 200 MCG tablet; Take 1 tablet by mouth Daily.    JAIDA (generalized anxiety disorder)  -     LORazepam (ATIVAN) 0.5 MG tablet; Take 1 tablet by mouth Every 8 (Eight) Hours As Needed for Anxiety.    Allergic rhinitis, unspecified seasonality, unspecified trigger  -     montelukast (SINGULAIR) 10 MG tablet; Take 1 tablet by mouth Every Evening.    History of BPH  -     tamsulosin (FLOMAX) 0.4 MG capsule 24 hr capsule; Take 1 capsule by mouth Daily.

## 2018-10-22 NOTE — PATIENT INSTRUCTIONS
Exercise 30 minutes most days of the week  Sleep 6-8 hours each night if possible  Low fat, low cholesterol diet   we discussed prescribed medications and how to take them   make sure you get results of any labs/studies ordered today  Low glycemic index diet   L call you labs today       oose weight       Stop smoking cigarettes

## 2018-10-22 NOTE — PROGRESS NOTES
Subjective   Chief Complaint:   Chief Complaint   Patient presents with   • Hypertension   • Anxiety         History of Present Illness             Tony Max 68 y.o. male who presents today for Medical Management of the below listed issues and medication refills.    ICD-10-CM ICD-9-CM   1. Hypothyroidism, unspecified type E03.9 244.9   2. JAIDA (generalized anxiety disorder) F41.1 300.02   3. Allergic rhinitis, unspecified seasonality, unspecified trigger J30.9 477.9   4. History of BPH Z87.438 V13.89        he has a problem list of   Patient Active Problem List   Diagnosis   • JAIDA (generalized anxiety disorder)   • Atrial fibrillation (CMS/HCC)   • Depression   • GERD (gastroesophageal reflux disease)   • Heart attack (CMS/HCC)   • HLD (hyperlipidemia)   • Benign essential hypertension   • Hypothyroidism, acquired   • Sleep apnea   • Hypothyroidism   • History of BPH   • Elevated blood sugar   • Seasonal allergies   • Acute maxillary sinusitis   • UTI (urinary tract infection)   .  Since the last visit, he has overall felt well.  he has been compliant with   Current Outpatient Prescriptions:   •  albuterol (PROVENTIL HFA;VENTOLIN HFA) 108 (90 Base) MCG/ACT inhaler, Inhale 2 puffs Every 4 (Four) Hours As Needed for Wheezing., Disp: 1 inhaler, Rfl: 11  •  apixaban (ELIQUIS) 5 MG tablet tablet, Take 1 tablet by mouth 2 (Two) Times a Day., Disp: 60 tablet, Rfl:   •  aspirin 81 MG chewable tablet, Chew 1 tablet Daily., Disp: , Rfl:   •  escitalopram (LEXAPRO) 10 MG tablet, , Disp: , Rfl:   •  finasteride (PROSCAR) 5 MG tablet, , Disp: , Rfl:   •  fluticasone (FLONASE) 50 MCG/ACT nasal spray, 2 sprays into each nostril Daily., Disp: 3 bottle, Rfl: 1  •  levothyroxine (SYNTHROID, LEVOTHROID) 200 MCG tablet, Take 1 tablet by mouth Daily., Disp: 30 tablet, Rfl: 0  •  LORazepam (ATIVAN) 0.5 MG tablet, Take 1 tablet by mouth Every 8 (Eight) Hours As Needed for Anxiety., Disp: 30 tablet, Rfl: 2  •  metoprolol succinate XL  "(TOPROL-XL) 25 MG 24 hr tablet, Take 25 mg by mouth Daily., Disp: , Rfl:   •  montelukast (SINGULAIR) 10 MG tablet, TAKE 1 TABLET BY MOUTH ONCE DAILY IN THE EVENING, Disp: 30 tablet, Rfl: 0  •  nitroglycerin (NITROSTAT) 0.4 MG SL tablet, Place 0.4 mg under the tongue Every 5 (Five) Minutes As Needed for chest pain. Take no more than 3 doses in 15 minutes., Disp: , Rfl:   •  simvastatin (ZOCOR) 20 MG tablet, Take 20 mg by mouth Every Night., Disp: , Rfl:   •  tamsulosin (FLOMAX) 0.4 MG capsule 24 hr capsule, Take 1 capsule by mouth Daily., Disp: 90 capsule, Rfl: 0.  he denies medication side effects.    All of the chronic condition(s) listed above are stable w/o issues.    /88   Pulse 72   Temp 97.9 °F (36.6 °C)   Resp 16   Ht 188 cm (74\")   Wt (!) 137 kg (303 lb)   SpO2 98%   BMI 38.90 kg/m²     Results for orders placed or performed in visit on 07/18/18   Comprehensive Metabolic Panel   Result Value Ref Range    Glucose 127 (H) 65 - 99 mg/dL    BUN 19 8 - 23 mg/dL    Creatinine 1.20 0.76 - 1.27 mg/dL    eGFR Non African Am 60 (L) >60 mL/min/1.73    eGFR African Am 73 >60 mL/min/1.73    BUN/Creatinine Ratio 15.8 7.0 - 25.0    Sodium 143 136 - 145 mmol/L    Potassium 4.7 3.5 - 5.2 mmol/L    Chloride 106 98 - 107 mmol/L    Total CO2 26.4 22.0 - 29.0 mmol/L    Calcium 9.0 8.6 - 10.5 mg/dL    Total Protein 6.7 6.0 - 8.5 g/dL    Albumin 4.30 3.50 - 5.20 g/dL    Globulin 2.4 gm/dL    A/G Ratio 1.8 g/dL    Total Bilirubin 1.9 (H) 0.1 - 1.2 mg/dL    Alkaline Phosphatase 120 (H) 39 - 117 U/L    AST (SGOT) 22 1 - 40 U/L    ALT (SGPT) 25 1 - 41 U/L   Lipid Panel   Result Value Ref Range    Total Cholesterol 106 0 - 200 mg/dL    Triglycerides 133 0 - 150 mg/dL    HDL Cholesterol 30 (L) 40 - 60 mg/dL    VLDL Cholesterol 26.6 5 - 40 mg/dL    LDL Cholesterol  49 0 - 100 mg/dL   Specimen Status Report   Result Value Ref Range    Specimen Status Comment    Written Authorization   Result Value Ref Range    Written " Authorization Comment              The following portions of the patient's history were reviewed and updated as appropriate: allergies, current medications, past family history, past medical history, past social history, past surgical history and problem list.    Review of Systems    Objective   Physical Exam    Assessment/Plan   Tony was seen today for hypertension and anxiety.    Diagnoses and all orders for this visit:    Hypothyroidism, unspecified type  -     levothyroxine (SYNTHROID, LEVOTHROID) 200 MCG tablet; Take 1 tablet by mouth Daily.    JAIDA (generalized anxiety disorder)  -     LORazepam (ATIVAN) 0.5 MG tablet; Take 1 tablet by mouth Every 8 (Eight) Hours As Needed for Anxiety.    Allergic rhinitis, unspecified seasonality, unspecified trigger  -     montelukast (SINGULAIR) 10 MG tablet; Take 1 tablet by mouth Every Evening.    History of BPH  -     tamsulosin (FLOMAX) 0.4 MG capsule 24 hr capsule; Take 1 capsule by mouth Daily.

## 2018-11-28 ENCOUNTER — OFFICE VISIT (OUTPATIENT)
Dept: RETAIL CLINIC | Facility: CLINIC | Age: 68
End: 2018-11-28

## 2018-11-28 VITALS
DIASTOLIC BLOOD PRESSURE: 85 MMHG | RESPIRATION RATE: 20 BRPM | OXYGEN SATURATION: 97 % | TEMPERATURE: 97.6 F | SYSTOLIC BLOOD PRESSURE: 135 MMHG | HEART RATE: 105 BPM

## 2018-11-28 DIAGNOSIS — J01.40 ACUTE NON-RECURRENT PANSINUSITIS: ICD-10-CM

## 2018-11-28 DIAGNOSIS — J40 BRONCHITIS: Primary | ICD-10-CM

## 2018-11-28 PROCEDURE — 99213 OFFICE O/P EST LOW 20 MIN: CPT | Performed by: NURSE PRACTITIONER

## 2018-11-28 RX ORDER — PREDNISONE 10 MG/1
TABLET ORAL
Qty: 21 EACH | Refills: 0 | OUTPATIENT
Start: 2018-11-28 | End: 2018-12-08

## 2018-11-28 RX ORDER — AMOXICILLIN 875 MG/1
875 TABLET, COATED ORAL EVERY 12 HOURS SCHEDULED
Qty: 20 TABLET | Refills: 0 | OUTPATIENT
Start: 2018-11-28 | End: 2018-12-08

## 2018-11-28 NOTE — PROGRESS NOTES
Subjective   Tony Max is a 68 y.o. male.     Bronchitis   This is a new problem. The current episode started in the past 7 days. The problem occurs constantly. The problem has been gradually worsening. Associated symptoms include congestion, coughing, fatigue, headaches (started on paxil), nausea and vomiting. Pertinent negatives include no chills, fever or sore throat. Nothing aggravates the symptoms. He has tried acetaminophen (cough drops, vicks rub) for the symptoms. The treatment provided no relief.        The following portions of the patient's history were reviewed and updated as appropriate: allergies, current medications, past family history, past medical history, past social history, past surgical history and problem list.    Review of Systems   Constitutional: Positive for fatigue. Negative for chills and fever.   HENT: Positive for congestion. Negative for sore throat.    Respiratory: Positive for cough.    Cardiovascular: Negative.    Gastrointestinal: Positive for nausea and vomiting.   Skin: Negative.    Neurological: Positive for headache.       Objective   Physical Exam   Constitutional: He is cooperative. No distress.   HENT:   Head: Normocephalic.   Right Ear: Hearing, external ear and ear canal normal. A middle ear effusion is present.   Left Ear: Hearing, external ear and ear canal normal. A middle ear effusion is present.   Nose: Sinus tenderness present.   Mouth/Throat: Posterior oropharyngeal erythema present.   Eyes: Conjunctivae, EOM and lids are normal. Pupils are equal, round, and reactive to light.   Neck: Trachea normal and full passive range of motion without pain.   Cardiovascular: Normal rate, regular rhythm and normal pulses.   Pulmonary/Chest: Effort normal. He has decreased breath sounds in the right lower field and the left lower field. He has wheezes in the right upper field, the right middle field, the left upper field and the left middle field.   Neurological: He is  alert.   Skin: Skin is warm. Capillary refill takes less than 2 seconds.   Psychiatric: He has a normal mood and affect. His speech is normal and behavior is normal.   Vitals reviewed.        Assessment/Plan   Tony was seen today for bronchitis.    Diagnoses and all orders for this visit:    Bronchitis    Acute non-recurrent pansinusitis    Other orders  -     PredniSONE (DELTASONE) 10 MG (21) tablet pack; Use as directed on package  -     amoxicillin (AMOXIL) 875 MG tablet; Take 1 tablet by mouth Every 12 (Twelve) Hours for 10 days.

## 2019-02-12 LAB
BASOPHILS # BLD AUTO: 0 X10E3/UL (ref 0–0.2)
BASOPHILS NFR BLD AUTO: 0 %
EOSINOPHIL # BLD AUTO: 0 X10E3/UL (ref 0–0.4)
EOSINOPHIL NFR BLD AUTO: 0 %
ERYTHROCYTE [DISTWIDTH] IN BLOOD BY AUTOMATED COUNT: 14.3 % (ref 12.3–15.4)
HBA1C MFR BLD: 6 % (ref 4.8–5.6)
HCT VFR BLD AUTO: 46.7 % (ref 37.5–51)
HGB BLD-MCNC: 16.1 G/DL (ref 13–17.7)
IMM GRANULOCYTES # BLD AUTO: 0 X10E3/UL (ref 0–0.1)
IMM GRANULOCYTES NFR BLD AUTO: 0 %
LYMPHOCYTES # BLD AUTO: 3.8 X10E3/UL (ref 0.7–3.1)
LYMPHOCYTES NFR BLD AUTO: 35 %
MCH RBC QN AUTO: 34.3 PG (ref 26.6–33)
MCHC RBC AUTO-ENTMCNC: 34.5 G/DL (ref 31.5–35.7)
MCV RBC AUTO: 100 FL (ref 79–97)
MONOCYTES # BLD AUTO: 1.2 X10E3/UL (ref 0.1–0.9)
MONOCYTES NFR BLD AUTO: 11 %
NEUTROPHILS # BLD AUTO: 5.9 X10E3/UL (ref 1.4–7)
NEUTROPHILS NFR BLD AUTO: 54 %
PLATELET # BLD AUTO: 177 X10E3/UL (ref 150–379)
RBC # BLD AUTO: 4.69 X10E6/UL (ref 4.14–5.8)
TSH SERPL DL<=0.005 MIU/L-ACNC: 0.48 UIU/ML (ref 0.45–4.5)
WBC # BLD AUTO: 11 X10E3/UL (ref 3.4–10.8)

## 2019-02-26 ENCOUNTER — OFFICE VISIT (OUTPATIENT)
Dept: FAMILY MEDICINE CLINIC | Facility: CLINIC | Age: 69
End: 2019-02-26

## 2019-02-26 VITALS
HEIGHT: 74 IN | TEMPERATURE: 97.9 F | HEART RATE: 96 BPM | RESPIRATION RATE: 16 BRPM | BODY MASS INDEX: 39.01 KG/M2 | OXYGEN SATURATION: 96 % | WEIGHT: 304 LBS | DIASTOLIC BLOOD PRESSURE: 86 MMHG | SYSTOLIC BLOOD PRESSURE: 122 MMHG

## 2019-02-26 DIAGNOSIS — Z87.438 HISTORY OF BPH: ICD-10-CM

## 2019-02-26 DIAGNOSIS — J30.9 ALLERGIC RHINITIS, UNSPECIFIED SEASONALITY, UNSPECIFIED TRIGGER: ICD-10-CM

## 2019-02-26 DIAGNOSIS — R73.01 IFG (IMPAIRED FASTING GLUCOSE): Primary | ICD-10-CM

## 2019-02-26 DIAGNOSIS — E03.9 HYPOTHYROIDISM, UNSPECIFIED TYPE: ICD-10-CM

## 2019-02-26 DIAGNOSIS — I48.20 CHRONIC ATRIAL FIBRILLATION (HCC): ICD-10-CM

## 2019-02-26 DIAGNOSIS — E66.01 CLASS 2 SEVERE OBESITY DUE TO EXCESS CALORIES WITH SERIOUS COMORBIDITY AND BODY MASS INDEX (BMI) OF 38.0 TO 38.9 IN ADULT (HCC): ICD-10-CM

## 2019-02-26 PROBLEM — E66.812 CLASS 2 SEVERE OBESITY DUE TO EXCESS CALORIES WITH SERIOUS COMORBIDITY AND BODY MASS INDEX (BMI) OF 38.0 TO 38.9 IN ADULT: Status: ACTIVE | Noted: 2019-02-26

## 2019-02-26 PROCEDURE — 99214 OFFICE O/P EST MOD 30 MIN: CPT | Performed by: FAMILY MEDICINE

## 2019-02-26 RX ORDER — LAMOTRIGINE 25 MG/1
TABLET ORAL
COMMUNITY
Start: 2019-02-11 | End: 2019-03-30 | Stop reason: HOSPADM

## 2019-02-26 RX ORDER — LEVOTHYROXINE SODIUM 0.2 MG/1
200 TABLET ORAL DAILY
Qty: 90 TABLET | Refills: 1 | Status: SHIPPED | OUTPATIENT
Start: 2019-02-26 | End: 2019-08-23 | Stop reason: SDUPTHER

## 2019-02-26 RX ORDER — TAMSULOSIN HYDROCHLORIDE 0.4 MG/1
1 CAPSULE ORAL DAILY
Qty: 90 CAPSULE | Refills: 1 | Status: CANCELLED | OUTPATIENT
Start: 2019-02-26

## 2019-02-26 RX ORDER — MONTELUKAST SODIUM 10 MG/1
10 TABLET ORAL EVERY EVENING
Qty: 90 TABLET | Refills: 1 | Status: SHIPPED | OUTPATIENT
Start: 2019-02-26 | End: 2019-05-24 | Stop reason: SDUPTHER

## 2019-04-25 LAB
ALBUMIN SERPL-MCNC: 4.3 G/DL (ref 3.5–5.2)
ALBUMIN/GLOB SERPL: 1.6 G/DL
ALP SERPL-CCNC: 109 U/L (ref 39–117)
ALT SERPL-CCNC: 27 U/L (ref 1–41)
AST SERPL-CCNC: 23 U/L (ref 1–40)
BILIRUB SERPL-MCNC: 1.5 MG/DL (ref 0.2–1.2)
BUN SERPL-MCNC: 17 MG/DL (ref 8–23)
BUN/CREAT SERPL: 12.7 (ref 7–25)
CALCIUM SERPL-MCNC: 9.4 MG/DL (ref 8.6–10.5)
CHLORIDE SERPL-SCNC: 108 MMOL/L (ref 98–107)
CO2 SERPL-SCNC: 22.2 MMOL/L (ref 22–29)
CREAT SERPL-MCNC: 1.34 MG/DL (ref 0.76–1.27)
GLOBULIN SER CALC-MCNC: 2.7 GM/DL
GLUCOSE SERPL-MCNC: 110 MG/DL (ref 65–99)
HBA1C MFR BLD: 5.6 % (ref 4.8–5.6)
POTASSIUM SERPL-SCNC: 4.7 MMOL/L (ref 3.5–5.2)
PROT SERPL-MCNC: 7 G/DL (ref 6–8.5)
SODIUM SERPL-SCNC: 142 MMOL/L (ref 136–145)

## 2019-05-24 ENCOUNTER — OFFICE VISIT (OUTPATIENT)
Dept: FAMILY MEDICINE CLINIC | Facility: CLINIC | Age: 69
End: 2019-05-24

## 2019-05-24 VITALS
WEIGHT: 282 LBS | SYSTOLIC BLOOD PRESSURE: 112 MMHG | TEMPERATURE: 97.7 F | OXYGEN SATURATION: 96 % | RESPIRATION RATE: 16 BRPM | HEART RATE: 82 BPM | HEIGHT: 74 IN | DIASTOLIC BLOOD PRESSURE: 76 MMHG | BODY MASS INDEX: 36.19 KG/M2

## 2019-05-24 DIAGNOSIS — F41.1 GAD (GENERALIZED ANXIETY DISORDER): ICD-10-CM

## 2019-05-24 DIAGNOSIS — Z12.11 SCREEN FOR COLON CANCER: Primary | ICD-10-CM

## 2019-05-24 DIAGNOSIS — J30.9 ALLERGIC RHINITIS, UNSPECIFIED SEASONALITY, UNSPECIFIED TRIGGER: ICD-10-CM

## 2019-05-24 PROBLEM — R07.89 ATYPICAL CHEST PAIN: Status: ACTIVE | Noted: 2018-08-21

## 2019-05-24 PROBLEM — Z79.01 CHRONIC ANTICOAGULATION: Status: ACTIVE | Noted: 2018-08-21

## 2019-05-24 PROCEDURE — 99213 OFFICE O/P EST LOW 20 MIN: CPT | Performed by: FAMILY MEDICINE

## 2019-05-24 RX ORDER — MONTELUKAST SODIUM 10 MG/1
10 TABLET ORAL EVERY EVENING
Qty: 90 TABLET | Refills: 1 | Status: SHIPPED | OUTPATIENT
Start: 2019-05-24 | End: 2019-08-23 | Stop reason: SDUPTHER

## 2019-05-24 RX ORDER — ALBUTEROL SULFATE 90 UG/1
2 AEROSOL, METERED RESPIRATORY (INHALATION) EVERY 4 HOURS PRN
Qty: 1 INHALER | Refills: 11 | Status: SHIPPED | OUTPATIENT
Start: 2019-05-24 | End: 2019-09-23

## 2019-05-24 RX ORDER — RISPERIDONE 0.5 MG/1
0.5 TABLET ORAL
Refills: 0 | COMMUNITY
Start: 2019-05-20 | End: 2019-09-23

## 2019-05-24 RX ORDER — DIVALPROEX SODIUM 250 MG/1
250 TABLET, DELAYED RELEASE ORAL
COMMUNITY
End: 2019-05-24 | Stop reason: SDUPTHER

## 2019-05-24 NOTE — PROGRESS NOTES
Subjective   Chief Complaint:   Chief Complaint   Patient presents with   • Allergies   • Anxiety         History of Present Illness     The patient has read and signed the [facility] Controlled Substance Contract.  I will continue to see patient for regular follow up appointments.  They are well controlled on their medication.  FABIAN has been reviewed by me and is updated every 3 months. The patient is aware of the potential for addiction and dependence.            Tony Max 68 y.o. male who presents today for Medical Management of the below listed issues and medication refills.    ICD-10-CM ICD-9-CM   1. Screen for colon cancer Z12.11 V76.51   2. JAIDA (generalized anxiety disorder) F41.1 300.02   3. Allergic rhinitis, unspecified seasonality, unspecified trigger J30.9 477.9        he has a problem list of   Patient Active Problem List   Diagnosis   • JAIDA (generalized anxiety disorder)   • Atrial fibrillation (CMS/[facility])   • Depression   • GERD (gastroesophageal reflux disease)   • Heart attack (CMS/[facility])   • HLD (hyperlipidemia)   • Benign essential hypertension   • Hypothyroidism, acquired   • Sleep apnea   • Hypothyroidism   • History of BPH   • Elevated blood sugar   • Seasonal allergies   • Acute maxillary sinusitis   • UTI (urinary tract infection)   • Class 2 severe obesity due to excess calories with serious comorbidity and body mass index (BMI) of 38.0 to 38.9 in adult (CMS/[facility])   • Atypical chest pain   • Chronic anticoagulation   • Non-ST elevated myocardial infarction (non-STEMI) (CMS/[facility])   .  Since the last visit, he has overall felt well.  he has been compliant with   Current Outpatient Medications:   •  albuterol (PROVENTIL HFA;VENTOLIN HFA) 108 (90 Base) MCG/ACT inhaler, Inhale 2 puffs Every 4 (Four) Hours As Needed for Wheezing., Disp: 1 inhaler, Rfl: 11  •  apixaban (ELIQUIS) 5 MG tablet tablet, Take 1 tablet by mouth 2 (Two) Times a Day., Disp: 60 tablet, Rfl:   •  aspirin 81 MG chewable  "tablet, Chew 1 tablet Daily., Disp: , Rfl:   •  AZO-CRANBERRY PO, Take  by mouth., Disp: , Rfl:   •  divalproex (DEPAKOTE) 250 MG DR tablet, Take 250 mg by mouth 2 (Two) Times a Day., Disp: , Rfl:   •  finasteride (PROSCAR) 5 MG tablet, , Disp: , Rfl:   •  levothyroxine (SYNTHROID, LEVOTHROID) 200 MCG tablet, Take 1 tablet by mouth Daily., Disp: 90 tablet, Rfl: 1  •  LORazepam (ATIVAN) 0.5 MG tablet, Take 1 tablet by mouth Every 8 (Eight) Hours As Needed for Anxiety., Disp: 30 tablet, Rfl: 2  •  metoprolol succinate XL (TOPROL-XL) 25 MG 24 hr tablet, Take 25 mg by mouth Daily., Disp: , Rfl:   •  montelukast (SINGULAIR) 10 MG tablet, Take 1 tablet by mouth Every Evening., Disp: 90 tablet, Rfl: 1  •  nitroglycerin (NITROSTAT) 0.4 MG SL tablet, Place 0.4 mg under the tongue Every 5 (Five) Minutes As Needed for chest pain. Take no more than 3 doses in 15 minutes., Disp: , Rfl:   •  risperiDONE (risperDAL) 0.5 MG tablet, Take 0.5 mg by mouth every night at bedtime., Disp: , Rfl: 0  •  simvastatin (ZOCOR) 20 MG tablet, Take 20 mg by mouth Every Night., Disp: , Rfl: .  he denies medication side effects.    All of the chronic condition(s) listed above are stable w/o issues.    /76   Pulse 82   Temp 97.7 °F (36.5 °C)   Resp 16   Ht 188 cm (74\")   Wt 128 kg (282 lb)   SpO2 96%   BMI 36.21 kg/m²     Results for orders placed or performed during the hospital encounter of 03/30/19   Urine Culture - Urine, Urine, Clean Catch   Result Value Ref Range    Urine Culture Final report (A)     Result 1 Escherichia coli (A)     Susceptibility Testing Comment    POC Urinalysis Dipstick, Multipro (Automated dipstick)   Result Value Ref Range    Color Red (A) Yellow, Straw, Dark Yellow, Mary    Clarity, UA Cloudy (A) Clear    Glucose, UA Negative Negative, 1000 mg/dL (3+) mg/dL    Bilirubin Moderate (2+) (A) Negative    Ketones, UA 15 mg/dL (A) Negative    Specific Gravity  1.025 1.005 - 1.030    Blood, UA Large (A) Negative "    pH, Urine 6.5 5.0 - 8.0    Protein,  mg/dL (A) Negative mg/dL    Urobilinogen, UA Normal Normal    Nitrite, UA Positive (A) Negative    Leukocytes Large (3+) (A) Negative             The following portions of the patient's history were reviewed and updated as appropriate: allergies, current medications, past family history, past medical history, past social history, past surgical history and problem list.    Review of Systems    Objective   Physical Exam    Assessment/Plan   Tony was seen today for allergies and anxiety.    Diagnoses and all orders for this visit:    Screen for colon cancer    JAIDA (generalized anxiety disorder)    Allergic rhinitis, unspecified seasonality, unspecified trigger

## 2019-05-24 NOTE — PROGRESS NOTES
Subjective   Chief Complaint:   Chief Complaint   Patient presents with   • Hypertension   • Hyperlipidemia   • Atrial Fibrilation   • Impaired Fasting Glucose         History of Present Illness need a  Refill   Proventil inhaler that was done and Singulair.  Viewed his labs from the past 2 or 3 times and at present his blood sugar was 210 and his hemoglobin A1c was 5.6 he has impaired fasting glucose total bilirubin was 1.5 last TSH was okay he just had recent surgery on his prostate and he still being bothered with prostate problems.  In atrial fib at present his heart sounded like a regular sinus rhythm            Tony BENAVIDEZ Mansoor 68 y.o. male who presents today for Medical Management of the below listed issues and medication refills.    ICD-10-CM ICD-9-CM   1. Screen for colon cancer Z12.11 V76.51   2. JAIDA (generalized anxiety disorder) F41.1 300.02   3. Allergic rhinitis, unspecified seasonality, unspecified trigger J30.9 477.9        he has a problem list of   Patient Active Problem List   Diagnosis   • JAIDA (generalized anxiety disorder)   • Atrial fibrillation (CMS/[facility])   • Depression   • GERD (gastroesophageal reflux disease)   • Heart attack (CMS/[facility])   • HLD (hyperlipidemia)   • Benign essential hypertension   • Hypothyroidism, acquired   • Sleep apnea   • Hypothyroidism   • History of BPH   • Elevated blood sugar   • Seasonal allergies   • Acute maxillary sinusitis   • UTI (urinary tract infection)   • Class 2 severe obesity due to excess calories with serious comorbidity and body mass index (BMI) of 38.0 to 38.9 in adult (CMS/[facility])   • Atypical chest pain   • Chronic anticoagulation   • Non-ST elevated myocardial infarction (non-STEMI) (CMS/[facility])   .  Since the last visit, he has overall felt well.  he has been compliant with   Current Outpatient Medications:   •  albuterol (PROVENTIL HFA;VENTOLIN HFA) 108 (90 Base) MCG/ACT inhaler, Inhale 2 puffs Every 4 (Four) Hours As Needed for Wheezing., Disp: 1 inhaler,  "Rfl: 11  •  apixaban (ELIQUIS) 5 MG tablet tablet, Take 1 tablet by mouth 2 (Two) Times a Day., Disp: 60 tablet, Rfl:   •  aspirin 81 MG chewable tablet, Chew 1 tablet Daily., Disp: , Rfl:   •  AZO-CRANBERRY PO, Take  by mouth., Disp: , Rfl:   •  divalproex (DEPAKOTE) 250 MG DR tablet, Take 250 mg by mouth 2 (Two) Times a Day., Disp: , Rfl:   •  finasteride (PROSCAR) 5 MG tablet, , Disp: , Rfl:   •  levothyroxine (SYNTHROID, LEVOTHROID) 200 MCG tablet, Take 1 tablet by mouth Daily., Disp: 90 tablet, Rfl: 1  •  LORazepam (ATIVAN) 0.5 MG tablet, Take 1 tablet by mouth Every 8 (Eight) Hours As Needed for Anxiety., Disp: 30 tablet, Rfl: 2  •  metoprolol succinate XL (TOPROL-XL) 25 MG 24 hr tablet, Take 25 mg by mouth Daily., Disp: , Rfl:   •  montelukast (SINGULAIR) 10 MG tablet, Take 1 tablet by mouth Every Evening., Disp: 90 tablet, Rfl: 1  •  nitroglycerin (NITROSTAT) 0.4 MG SL tablet, Place 0.4 mg under the tongue Every 5 (Five) Minutes As Needed for chest pain. Take no more than 3 doses in 15 minutes., Disp: , Rfl:   •  risperiDONE (risperDAL) 0.5 MG tablet, Take 0.5 mg by mouth every night at bedtime., Disp: , Rfl: 0  •  simvastatin (ZOCOR) 20 MG tablet, Take 20 mg by mouth Every Night., Disp: , Rfl: .  he denies medication side effects.    All of the chronic condition(s) listed above are stable w/o issues.    /76   Pulse 82   Temp 97.7 °F (36.5 °C)   Resp 16   Ht 188 cm (74\")   Wt 128 kg (282 lb)   SpO2 96%   BMI 36.21 kg/m²     Results for orders placed or performed during the hospital encounter of 03/30/19   Urine Culture - Urine, Urine, Clean Catch   Result Value Ref Range    Urine Culture Final report (A)     Result 1 Escherichia coli (A)     Susceptibility Testing Comment    POC Urinalysis Dipstick, Multipro (Automated dipstick)   Result Value Ref Range    Color Red (A) Yellow, Straw, Dark Yellow, Mary    Clarity, UA Cloudy (A) Clear    Glucose, UA Negative Negative, 1000 mg/dL (3+) mg/dL    " Bilirubin Moderate (2+) (A) Negative    Ketones, UA 15 mg/dL (A) Negative    Specific Gravity  1.025 1.005 - 1.030    Blood, UA Large (A) Negative    pH, Urine 6.5 5.0 - 8.0    Protein,  mg/dL (A) Negative mg/dL    Urobilinogen, UA Normal Normal    Nitrite, UA Positive (A) Negative    Leukocytes Large (3+) (A) Negative             The following portions of the patient's history were reviewed and updated as appropriate: allergies, current medications, past family history, past medical history, past social history, past surgical history and problem list.    Review of Systems   Constitutional: Negative for activity change, appetite change and unexpected weight change.   Eyes: Negative for visual disturbance.   Respiratory: Negative for chest tightness and shortness of breath.    Cardiovascular: Negative for chest pain and palpitations.   Skin: Negative for color change.   Neurological: Negative for syncope and speech difficulty.   Psychiatric/Behavioral: Negative for confusion and decreased concentration.       Objective   Physical Exam   Constitutional: He is oriented to person, place, and time. He appears well-developed and well-nourished.   HENT:   Head: Atraumatic.   Mouth/Throat: Oropharynx is clear and moist.   Eyes: EOM are normal. Pupils are equal, round, and reactive to light.   Neck: Normal range of motion. Neck supple. No thyromegaly present.   Cardiovascular: Normal rate and regular rhythm.   Pulmonary/Chest: Effort normal and breath sounds normal.   Abdominal: Soft.   Musculoskeletal: Normal range of motion.   Neurological: He is alert and oriented to person, place, and time.   Skin: Skin is warm and dry.   Psychiatric: He has a normal mood and affect. His behavior is normal.   Nursing note and vitals reviewed.      Assessment/Plan   Tony was seen today for hypertension, hyperlipidemia, atrial fibrilation and impaired fasting glucose.    Diagnoses and all orders for this visit:    Screen for colon  cancer    JAIDA (generalized anxiety disorder)    Allergic rhinitis, unspecified seasonality, unspecified trigger

## 2019-05-24 NOTE — PROGRESS NOTES
Subjective   Chief Complaint:   Chief Complaint   Patient presents with   • Allergies   • Anxiety         History of Present Illness             Tony Max 68 y.o. male who presents today for Medical Management of the below listed issues and medication refills.    ICD-10-CM ICD-9-CM   1. Screen for colon cancer Z12.11 V76.51   2. JAIDA (generalized anxiety disorder) F41.1 300.02   3. Allergic rhinitis, unspecified seasonality, unspecified trigger J30.9 477.9        he has a problem list of   Patient Active Problem List   Diagnosis   • JAIDA (generalized anxiety disorder)   • Atrial fibrillation (CMS/[facility])   • Depression   • GERD (gastroesophageal reflux disease)   • Heart attack (CMS/[facility])   • HLD (hyperlipidemia)   • Benign essential hypertension   • Hypothyroidism, acquired   • Sleep apnea   • Hypothyroidism   • History of BPH   • Elevated blood sugar   • Seasonal allergies   • Acute maxillary sinusitis   • UTI (urinary tract infection)   • Class 2 severe obesity due to excess calories with serious comorbidity and body mass index (BMI) of 38.0 to 38.9 in adult (CMS/[facility])   • Atypical chest pain   • Chronic anticoagulation   • Non-ST elevated myocardial infarction (non-STEMI) (CMS/[facility])   .  Since the last visit, he has overall felt well.  he has been compliant with   Current Outpatient Medications:   •  albuterol (PROVENTIL HFA;VENTOLIN HFA) 108 (90 Base) MCG/ACT inhaler, Inhale 2 puffs Every 4 (Four) Hours As Needed for Wheezing., Disp: 1 inhaler, Rfl: 11  •  apixaban (ELIQUIS) 5 MG tablet tablet, Take 1 tablet by mouth 2 (Two) Times a Day., Disp: 60 tablet, Rfl:   •  aspirin 81 MG chewable tablet, Chew 1 tablet Daily., Disp: , Rfl:   •  AZO-CRANBERRY PO, Take  by mouth., Disp: , Rfl:   •  divalproex (DEPAKOTE) 250 MG DR tablet, Take 250 mg by mouth 2 (Two) Times a Day., Disp: , Rfl:   •  finasteride (PROSCAR) 5 MG tablet, , Disp: , Rfl:   •  levothyroxine (SYNTHROID, LEVOTHROID) 200 MCG tablet, Take 1 tablet by mouth  "Daily., Disp: 90 tablet, Rfl: 1  •  LORazepam (ATIVAN) 0.5 MG tablet, Take 1 tablet by mouth Every 8 (Eight) Hours As Needed for Anxiety., Disp: 30 tablet, Rfl: 2  •  metoprolol succinate XL (TOPROL-XL) 25 MG 24 hr tablet, Take 25 mg by mouth Daily., Disp: , Rfl:   •  montelukast (SINGULAIR) 10 MG tablet, Take 1 tablet by mouth Every Evening., Disp: 90 tablet, Rfl: 1  •  nitroglycerin (NITROSTAT) 0.4 MG SL tablet, Place 0.4 mg under the tongue Every 5 (Five) Minutes As Needed for chest pain. Take no more than 3 doses in 15 minutes., Disp: , Rfl:   •  risperiDONE (risperDAL) 0.5 MG tablet, Take 0.5 mg by mouth every night at bedtime., Disp: , Rfl: 0  •  simvastatin (ZOCOR) 20 MG tablet, Take 20 mg by mouth Every Night., Disp: , Rfl: .  he denies medication side effects.    All of the chronic condition(s) listed above are stable w/o issues.    /76   Pulse 82   Temp 97.7 °F (36.5 °C)   Resp 16   Ht 188 cm (74\")   Wt 128 kg (282 lb)   SpO2 96%   BMI 36.21 kg/m²     Results for orders placed or performed during the hospital encounter of 03/30/19   Urine Culture - Urine, Urine, Clean Catch   Result Value Ref Range    Urine Culture Final report (A)     Result 1 Escherichia coli (A)     Susceptibility Testing Comment    POC Urinalysis Dipstick, Multipro (Automated dipstick)   Result Value Ref Range    Color Red (A) Yellow, Straw, Dark Yellow, Mary    Clarity, UA Cloudy (A) Clear    Glucose, UA Negative Negative, 1000 mg/dL (3+) mg/dL    Bilirubin Moderate (2+) (A) Negative    Ketones, UA 15 mg/dL (A) Negative    Specific Gravity  1.025 1.005 - 1.030    Blood, UA Large (A) Negative    pH, Urine 6.5 5.0 - 8.0    Protein,  mg/dL (A) Negative mg/dL    Urobilinogen, UA Normal Normal    Nitrite, UA Positive (A) Negative    Leukocytes Large (3+) (A) Negative             The following portions of the patient's history were reviewed and updated as appropriate: allergies, current medications, past family history, " past medical history, past social history, past surgical history and problem list.    Review of Systems    Objective   Physical Exam    Assessment/Plan   Tony was seen today for allergies and anxiety.    Diagnoses and all orders for this visit:    Screen for colon cancer    JAIDA (generalized anxiety disorder)    Allergic rhinitis, unspecified seasonality, unspecified trigger

## 2019-05-24 NOTE — PATIENT INSTRUCTIONS
Exercise 30 minutes most days of the week  Sleep 6-8 hours each night if possible  Low fat, low cholesterol diet   we discussed prescribed medications and how to take them   make sure you get results of any labs/studies ordered today  Low glycemic index diet

## 2019-08-23 ENCOUNTER — OFFICE VISIT (OUTPATIENT)
Dept: FAMILY MEDICINE CLINIC | Facility: CLINIC | Age: 69
End: 2019-08-23

## 2019-08-23 VITALS
OXYGEN SATURATION: 98 % | BODY MASS INDEX: 36.45 KG/M2 | SYSTOLIC BLOOD PRESSURE: 138 MMHG | HEIGHT: 74 IN | RESPIRATION RATE: 16 BRPM | DIASTOLIC BLOOD PRESSURE: 86 MMHG | WEIGHT: 284 LBS | HEART RATE: 82 BPM | TEMPERATURE: 98.1 F

## 2019-08-23 DIAGNOSIS — R39.9 UTI SYMPTOMS: ICD-10-CM

## 2019-08-23 DIAGNOSIS — I10 BENIGN ESSENTIAL HYPERTENSION: Primary | ICD-10-CM

## 2019-08-23 DIAGNOSIS — I48.0 PAROXYSMAL ATRIAL FIBRILLATION (HCC): ICD-10-CM

## 2019-08-23 DIAGNOSIS — E03.9 HYPOTHYROIDISM, UNSPECIFIED TYPE: ICD-10-CM

## 2019-08-23 DIAGNOSIS — J30.9 ALLERGIC RHINITIS, UNSPECIFIED SEASONALITY, UNSPECIFIED TRIGGER: ICD-10-CM

## 2019-08-23 LAB
BILIRUB BLD-MCNC: NEGATIVE MG/DL
CLARITY, POC: CLEAR
COLOR UR: NORMAL
GLUCOSE UR STRIP-MCNC: NEGATIVE MG/DL
KETONES UR QL: NEGATIVE
LEUKOCYTE EST, POC: NEGATIVE
NITRITE UR-MCNC: NEGATIVE MG/ML
PH UR: 5.5 [PH] (ref 5–8)
PROT UR STRIP-MCNC: NEGATIVE MG/DL
RBC # UR STRIP: NEGATIVE /UL
SP GR UR: 1.02 (ref 1–1.03)
UROBILINOGEN UR QL: NORMAL

## 2019-08-23 PROCEDURE — 81003 URINALYSIS AUTO W/O SCOPE: CPT | Performed by: FAMILY MEDICINE

## 2019-08-23 PROCEDURE — 99213 OFFICE O/P EST LOW 20 MIN: CPT | Performed by: FAMILY MEDICINE

## 2019-08-23 RX ORDER — MONTELUKAST SODIUM 10 MG/1
10 TABLET ORAL EVERY EVENING
Qty: 90 TABLET | Refills: 1 | Status: SHIPPED | OUTPATIENT
Start: 2019-08-23 | End: 2020-02-26 | Stop reason: SDUPTHER

## 2019-08-23 RX ORDER — LEVOTHYROXINE SODIUM 0.2 MG/1
200 TABLET ORAL DAILY
Qty: 90 TABLET | Refills: 1 | Status: SHIPPED | OUTPATIENT
Start: 2019-08-23 | End: 2020-02-26 | Stop reason: SDUPTHER

## 2019-08-23 NOTE — PROGRESS NOTES
Subjective   Chief Complaint:   Chief Complaint   Patient presents with   • Hypothyroidism   • Urinary Tract Infection         History of Present Illness patient comes in today to refill meds namely Synthroid and Singulair.  He also had symptoms of UTI.  He was seen in the urology recently and he had surgery on his prostate for BPH.  Slight laser therapy to his prostate.  He has had frequency since then but it got better but here lately started with some more symptoms of a UTI with frequency so the dip urine was negative I am to send it out for urine with a micro and a urine C&S.  I refilled his Synthroid and Singulair.  And we reviewed labs.  bp  130/82          Tony Max 68 y.o. male who presents today for Medical Management of the below listed issues and medication refills.    ICD-10-CM ICD-9-CM   1. Allergic rhinitis, unspecified seasonality, unspecified trigger J30.9 477.9   2. Hypothyroidism, unspecified type E03.9 244.9        he has a problem list of   Patient Active Problem List   Diagnosis   • JAIDA (generalized anxiety disorder)   • Atrial fibrillation (CMS/[facility])   • Depression   • GERD (gastroesophageal reflux disease)   • Heart attack (CMS/[facility])   • HLD (hyperlipidemia)   • Benign essential hypertension   • Hypothyroidism, acquired   • Sleep apnea   • Hypothyroidism   • History of BPH   • Elevated blood sugar   • Seasonal allergies   • Acute maxillary sinusitis   • UTI (urinary tract infection)   • Class 2 severe obesity due to excess calories with serious comorbidity and body mass index (BMI) of 38.0 to 38.9 in adult (CMS/[facility])   • Atypical chest pain   • Chronic anticoagulation   • Non-ST elevated myocardial infarction (non-STEMI) (CMS/[facility])   .  Since the last visit, he has overall felt well.  he has been compliant with   Current Outpatient Medications:   •  albuterol sulfate  (90 Base) MCG/ACT inhaler, Inhale 2 puffs Every 4 (Four) Hours As Needed for Wheezing., Disp: 1 inhaler, Rfl: 11  •   "apixaban (ELIQUIS) 5 MG tablet tablet, Take 1 tablet by mouth 2 (Two) Times a Day., Disp: 60 tablet, Rfl:   •  aspirin 81 MG chewable tablet, Chew 1 tablet Daily., Disp: , Rfl:   •  AZO-CRANBERRY PO, Take  by mouth., Disp: , Rfl:   •  finasteride (PROSCAR) 5 MG tablet, , Disp: , Rfl:   •  levothyroxine (SYNTHROID, LEVOTHROID) 200 MCG tablet, Take 1 tablet by mouth Daily., Disp: 90 tablet, Rfl: 1  •  LORazepam (ATIVAN) 0.5 MG tablet, Take 1 tablet by mouth Every 8 (Eight) Hours As Needed for Anxiety., Disp: 30 tablet, Rfl: 2  •  metoprolol succinate XL (TOPROL-XL) 25 MG 24 hr tablet, Take 25 mg by mouth Daily., Disp: , Rfl:   •  montelukast (SINGULAIR) 10 MG tablet, Take 1 tablet by mouth Every Evening., Disp: 90 tablet, Rfl: 1  •  nitroglycerin (NITROSTAT) 0.4 MG SL tablet, Place 0.4 mg under the tongue Every 5 (Five) Minutes As Needed for chest pain. Take no more than 3 doses in 15 minutes., Disp: , Rfl:   •  risperiDONE (risperDAL) 0.5 MG tablet, Take 0.5 mg by mouth every night at bedtime., Disp: , Rfl: 0  •  simvastatin (ZOCOR) 20 MG tablet, Take 20 mg by mouth Every Night., Disp: , Rfl:   •  tamsulosin (FLOMAX) 0.4 MG capsule 24 hr capsule, Take 1 capsule by mouth every night at bedtime., Disp: , Rfl: 3  •  divalproex (DEPAKOTE) 250 MG DR tablet, Take 250 mg by mouth 2 (Two) Times a Day., Disp: , Rfl: .  he denies medication side effects.    All of the chronic condition(s) listed above are stable w/o issues.    /86   Pulse 82   Temp 98.1 °F (36.7 °C)   Resp 16   Ht 188 cm (74\")   Wt 129 kg (284 lb)   SpO2 98%   BMI 36.46 kg/m²     Results for orders placed or performed during the hospital encounter of 03/30/19   Urine Culture - Urine, Urine, Clean Catch   Result Value Ref Range    Urine Culture Final report (A)     Result 1 Escherichia coli (A)     Susceptibility Testing Comment    POC Urinalysis Dipstick, Multipro (Automated dipstick)   Result Value Ref Range    Color Red (A) Yellow, Straw, Dark " Yellow, Mary    Clarity, UA Cloudy (A) Clear    Glucose, UA Negative Negative, 1000 mg/dL (3+) mg/dL    Bilirubin Moderate (2+) (A) Negative    Ketones, UA 15 mg/dL (A) Negative    Specific Gravity  1.025 1.005 - 1.030    Blood, UA Large (A) Negative    pH, Urine 6.5 5.0 - 8.0    Protein,  mg/dL (A) Negative mg/dL    Urobilinogen, UA Normal Normal    Nitrite, UA Positive (A) Negative    Leukocytes Large (3+) (A) Negative             The following portions of the patient's history were reviewed and updated as appropriate: allergies, current medications, past family history, past medical history, past social history, past surgical history and problem list.    Review of Systems   Constitutional: Negative for activity change, appetite change and unexpected weight change.   Eyes: Negative for visual disturbance.   Respiratory: Negative for chest tightness and shortness of breath.    Cardiovascular: Negative for chest pain and palpitations.   Skin: Negative for color change.   Neurological: Negative for syncope and speech difficulty.   Psychiatric/Behavioral: Negative for confusion and decreased concentration.       Objective   Physical Exam   Constitutional: He is oriented to person, place, and time. He appears well-developed and well-nourished.   HENT:   Head: Atraumatic.   Mouth/Throat: Oropharynx is clear and moist.   Eyes: EOM are normal. Pupils are equal, round, and reactive to light.   Neck: Normal range of motion. Neck supple. No thyromegaly present.   Cardiovascular:    A fib   Pulmonary/Chest: Effort normal and breath sounds normal.   Abdominal: Soft.   Musculoskeletal: Normal range of motion.   Neurological: He is alert and oriented to person, place, and time.   Skin: Skin is warm and dry.   Psychiatric: He has a normal mood and affect. His behavior is normal.   Nursing note and vitals reviewed.      Assessment/Plan   Tony was seen today for hypothyroidism and urinary tract infection.    Diagnoses and  all orders for this visit:    Allergic rhinitis, unspecified seasonality, unspecified trigger  -     montelukast (SINGULAIR) 10 MG tablet; Take 1 tablet by mouth Every Evening.    Hypothyroidism, unspecified type  -     levothyroxine (SYNTHROID, LEVOTHROID) 200 MCG tablet; Take 1 tablet by mouth Daily.

## 2019-08-23 NOTE — PATIENT INSTRUCTIONS
Exercise 30 minutes most days of the week  Sleep 6-8 hours each night if possible  Low fat, low cholesterol diet   we discussed prescribed medications and how to take them   make sure you get results of any labs/studies ordered today  Low glycemic index diet     Recheck you in 6 months call me 3 weeks before you are due for your labs and I will figure out which labs to order

## 2019-08-26 LAB
APPEARANCE UR: CLEAR
BACTERIA #/AREA URNS HPF: ABNORMAL /[HPF]
BACTERIA UR CULT: NORMAL
BACTERIA UR CULT: NORMAL
BILIRUB UR QL STRIP: NEGATIVE
COLOR UR: YELLOW
EPI CELLS #/AREA URNS HPF: ABNORMAL /HPF
GLUCOSE UR QL: NEGATIVE
HGB UR QL STRIP: NEGATIVE
KETONES UR QL STRIP: NEGATIVE
LEUKOCYTE ESTERASE UR QL STRIP: NEGATIVE
Lab: NORMAL
MICRO URNS: NORMAL
MICRO URNS: NORMAL
MUCOUS THREADS URNS QL MICRO: PRESENT
NITRITE UR QL STRIP: NEGATIVE
PH UR STRIP: 5.5 [PH] (ref 5–7.5)
PROT UR QL STRIP: NEGATIVE
RBC #/AREA URNS HPF: ABNORMAL /HPF
SP GR UR: 1.02 (ref 1–1.03)
URINALYSIS REFLEX: NORMAL
UROBILINOGEN UR STRIP-MCNC: 0.2 MG/DL (ref 0.2–1)
WBC #/AREA URNS HPF: ABNORMAL /HPF

## 2019-09-23 ENCOUNTER — APPOINTMENT (OUTPATIENT)
Dept: ULTRASOUND IMAGING | Facility: HOSPITAL | Age: 69
End: 2019-09-23

## 2019-09-23 ENCOUNTER — APPOINTMENT (OUTPATIENT)
Dept: GENERAL RADIOLOGY | Facility: HOSPITAL | Age: 69
End: 2019-09-23

## 2019-09-23 ENCOUNTER — APPOINTMENT (OUTPATIENT)
Dept: CT IMAGING | Facility: HOSPITAL | Age: 69
End: 2019-09-23

## 2019-09-23 ENCOUNTER — HOSPITAL ENCOUNTER (EMERGENCY)
Facility: HOSPITAL | Age: 69
Discharge: HOME | End: 2019-09-23
Attending: EMERGENCY MEDICINE | Admitting: EMERGENCY MEDICINE

## 2019-09-23 VITALS
HEIGHT: 74 IN | DIASTOLIC BLOOD PRESSURE: 72 MMHG | BODY MASS INDEX: 35.68 KG/M2 | SYSTOLIC BLOOD PRESSURE: 126 MMHG | RESPIRATION RATE: 16 BRPM | TEMPERATURE: 98.7 F | WEIGHT: 278 LBS | OXYGEN SATURATION: 97 % | HEART RATE: 67 BPM

## 2019-09-23 DIAGNOSIS — E80.6 HYPERBILIRUBINEMIA: ICD-10-CM

## 2019-09-23 DIAGNOSIS — H81.10 BENIGN PAROXYSMAL POSITIONAL VERTIGO, UNSPECIFIED LATERALITY: Primary | ICD-10-CM

## 2019-09-23 LAB
ALBUMIN SERPL-MCNC: 4.7 G/DL (ref 3.5–5.2)
ALBUMIN/GLOB SERPL: 1.7 G/DL
ALP SERPL-CCNC: 112 U/L (ref 39–117)
ALT SERPL W P-5'-P-CCNC: 22 U/L (ref 1–41)
ANION GAP SERPL CALCULATED.3IONS-SCNC: 16.1 MMOL/L (ref 5–15)
AST SERPL-CCNC: 21 U/L (ref 1–40)
BACTERIA UR QL AUTO: ABNORMAL /HPF
BASOPHILS # BLD AUTO: 0.03 10*3/MM3 (ref 0–0.2)
BASOPHILS NFR BLD AUTO: 0.3 % (ref 0–1.5)
BILIRUB CONJ SERPL-MCNC: 0.3 MG/DL (ref 0.2–0.3)
BILIRUB SERPL-MCNC: 4.9 MG/DL (ref 0.2–1.2)
BILIRUB UR QL STRIP: NEGATIVE
BUN BLD-MCNC: 15 MG/DL (ref 8–23)
BUN/CREAT SERPL: 12.8 (ref 7–25)
CALCIUM SPEC-SCNC: 9 MG/DL (ref 8.6–10.5)
CHLORIDE SERPL-SCNC: 104 MMOL/L (ref 98–107)
CLARITY UR: CLEAR
CO2 SERPL-SCNC: 20.9 MMOL/L (ref 22–29)
COLOR UR: ABNORMAL
CREAT BLD-MCNC: 1.17 MG/DL (ref 0.76–1.27)
DEPRECATED RDW RBC AUTO: 43.9 FL (ref 37–54)
EOSINOPHIL # BLD AUTO: 0.03 10*3/MM3 (ref 0–0.4)
EOSINOPHIL NFR BLD AUTO: 0.3 % (ref 0.3–6.2)
ERYTHROCYTE [DISTWIDTH] IN BLOOD BY AUTOMATED COUNT: 12.7 % (ref 12.3–15.4)
GFR SERPL CREATININE-BSD FRML MDRD: 62 ML/MIN/1.73
GLOBULIN UR ELPH-MCNC: 2.7 GM/DL
GLUCOSE BLD-MCNC: 97 MG/DL (ref 65–99)
GLUCOSE UR STRIP-MCNC: NEGATIVE MG/DL
HCT VFR BLD AUTO: 51.8 % (ref 37.5–51)
HGB BLD-MCNC: 18 G/DL (ref 13–17.7)
HGB UR QL STRIP.AUTO: NEGATIVE
HOLD SPECIMEN: NORMAL
HOLD SPECIMEN: NORMAL
HYALINE CASTS UR QL AUTO: ABNORMAL /LPF
IMM GRANULOCYTES # BLD AUTO: 0.05 10*3/MM3 (ref 0–0.05)
IMM GRANULOCYTES NFR BLD AUTO: 0.4 % (ref 0–0.5)
KETONES UR QL STRIP: ABNORMAL
LDH SERPL-CCNC: 180 U/L (ref 135–225)
LEUKOCYTE ESTERASE UR QL STRIP.AUTO: ABNORMAL
LYMPHOCYTES # BLD AUTO: 3.8 10*3/MM3 (ref 0.7–3.1)
LYMPHOCYTES NFR BLD AUTO: 33.3 % (ref 19.6–45.3)
MAGNESIUM SERPL-MCNC: 2.3 MG/DL (ref 1.6–2.4)
MCH RBC QN AUTO: 33.1 PG (ref 26.6–33)
MCHC RBC AUTO-ENTMCNC: 34.7 G/DL (ref 31.5–35.7)
MCV RBC AUTO: 95.4 FL (ref 79–97)
MONOCYTES # BLD AUTO: 0.86 10*3/MM3 (ref 0.1–0.9)
MONOCYTES NFR BLD AUTO: 7.5 % (ref 5–12)
NEUTROPHILS # BLD AUTO: 6.64 10*3/MM3 (ref 1.7–7)
NEUTROPHILS NFR BLD AUTO: 58.2 % (ref 42.7–76)
NITRITE UR QL STRIP: NEGATIVE
NRBC BLD AUTO-RTO: 0 /100 WBC (ref 0–0.2)
PH UR STRIP.AUTO: <=5 [PH] (ref 5–8)
PLATELET # BLD AUTO: 182 10*3/MM3 (ref 140–450)
PMV BLD AUTO: 11.8 FL (ref 6–12)
POTASSIUM BLD-SCNC: 3.7 MMOL/L (ref 3.5–5.2)
PROT SERPL-MCNC: 7.4 G/DL (ref 6–8.5)
PROT UR QL STRIP: ABNORMAL
RBC # BLD AUTO: 5.43 10*6/MM3 (ref 4.14–5.8)
RBC # UR: ABNORMAL /HPF
REF LAB TEST METHOD: ABNORMAL
RETICS # AUTO: 0.12 10*6/MM3 (ref 0.02–0.13)
RETICS/RBC NFR AUTO: 2.28 % (ref 0.7–1.9)
SODIUM BLD-SCNC: 141 MMOL/L (ref 136–145)
SP GR UR STRIP: >=1.03 (ref 1–1.03)
SQUAMOUS #/AREA URNS HPF: ABNORMAL /HPF
TROPONIN T SERPL-MCNC: <0.01 NG/ML (ref 0–0.03)
UROBILINOGEN UR QL STRIP: ABNORMAL
WBC NRBC COR # BLD: 11.41 10*3/MM3 (ref 3.4–10.8)
WBC UR QL AUTO: ABNORMAL /HPF
WHOLE BLOOD HOLD SPECIMEN: NORMAL
WHOLE BLOOD HOLD SPECIMEN: NORMAL

## 2019-09-23 PROCEDURE — 93005 ELECTROCARDIOGRAM TRACING: CPT

## 2019-09-23 PROCEDURE — 82248 BILIRUBIN DIRECT: CPT | Performed by: EMERGENCY MEDICINE

## 2019-09-23 PROCEDURE — 93010 ELECTROCARDIOGRAM REPORT: CPT | Performed by: INTERNAL MEDICINE

## 2019-09-23 PROCEDURE — 71045 X-RAY EXAM CHEST 1 VIEW: CPT

## 2019-09-23 PROCEDURE — 76705 ECHO EXAM OF ABDOMEN: CPT

## 2019-09-23 PROCEDURE — 70450 CT HEAD/BRAIN W/O DYE: CPT

## 2019-09-23 PROCEDURE — 85025 COMPLETE CBC W/AUTO DIFF WBC: CPT

## 2019-09-23 PROCEDURE — 81001 URINALYSIS AUTO W/SCOPE: CPT | Performed by: EMERGENCY MEDICINE

## 2019-09-23 PROCEDURE — 84484 ASSAY OF TROPONIN QUANT: CPT

## 2019-09-23 PROCEDURE — 80053 COMPREHEN METABOLIC PANEL: CPT

## 2019-09-23 PROCEDURE — 83735 ASSAY OF MAGNESIUM: CPT

## 2019-09-23 PROCEDURE — 83615 LACTATE (LD) (LDH) ENZYME: CPT | Performed by: EMERGENCY MEDICINE

## 2019-09-23 PROCEDURE — 93005 ELECTROCARDIOGRAM TRACING: CPT | Performed by: EMERGENCY MEDICINE

## 2019-09-23 PROCEDURE — 99284 EMERGENCY DEPT VISIT MOD MDM: CPT

## 2019-09-23 PROCEDURE — 85045 AUTOMATED RETICULOCYTE COUNT: CPT | Performed by: EMERGENCY MEDICINE

## 2019-09-23 RX ORDER — SODIUM CHLORIDE 0.9 % (FLUSH) 0.9 %
10 SYRINGE (ML) INJECTION AS NEEDED
Status: DISCONTINUED | OUTPATIENT
Start: 2019-09-23 | End: 2019-09-24 | Stop reason: HOSPADM

## 2019-09-23 NOTE — ED TRIAGE NOTES
Pt states that he has had dizziness since Saturday. States that he feels like his ears are full and that it is worse when he changes position and moves his head. Pt reports a hx of A-fib. Pt appears to be in NAD at this time, skin is PWD, and breathing is even and unlabored.

## 2019-09-23 NOTE — ED PROVIDER NOTES
" EMERGENCY DEPARTMENT ENCOUNTER    CHIEF COMPLAINT  Chief Complaint: Dizziness  History given by: Patient  History limited by: nothing  Room Number: 20/20  PMD: Lane Altamirano MD      HPI:  Pt is a 68 y.o. male who presents complaining of abrupt onset of dizziness that began Saturday morning (3 days ago) but is currently improved at this time. He admits to nasal congestion (7 days) and decreased appetite s/t his depression. Pt denies change in speech, nausea, vomiting, and abd pain.He notes that it began 3 hours after he took his antidepressant on Saturday morning. This was the first time the pt took this medication and Sunday he did not take the medication due to the sx he was experiencing. Pt states that when he turns his head, stands up, or amulates the sx worsen. He reports that the sx are alleviated when lying still.  Currently his symptoms have resolved.  Pt states that he does take allergy medication q.d. and he is on Eliquis (last dose 1800 this evening) for Afib and cardiac stents and atrial fibrillation. Pt went to the  today and was referred to the ED as his \"eyes and tongue\" appeared yellow.        PAST MEDICAL HISTORY  Active Ambulatory Problems     Diagnosis Date Noted   • JAIDA (generalized anxiety disorder)    • Atrial fibrillation (CMS/HCC)    • Depression    • GERD (gastroesophageal reflux disease)    • Heart attack (CMS/HCC)    • HLD (hyperlipidemia)    • Benign essential hypertension    • Hypothyroidism, acquired    • Sleep apnea    • Hypothyroidism 12/28/2015   • History of BPH 12/28/2015   • Elevated blood sugar 08/09/2016   • Seasonal allergies 08/09/2016   • Acute maxillary sinusitis 08/29/2017   • UTI (urinary tract infection) 09/06/2018   • Class 2 severe obesity due to excess calories with serious comorbidity and body mass index (BMI) of 38.0 to 38.9 in adult (CMS/HCC) 02/26/2019   • Atypical chest pain 08/21/2018   • Chronic anticoagulation 08/21/2018   • Non-ST elevated myocardial " infarction (non-STEMI) (CMS/HCC) 05/15/2011     Resolved Ambulatory Problems     Diagnosis Date Noted   • No Resolved Ambulatory Problems     Past Medical History:   Diagnosis Date   • Acute sinus infection    • Atrial fibrillation (CMS/HCC)    • Benign essential hypertension    • Bladder tumor    • Depression    • JAIDA (generalized anxiety disorder)    • GERD (gastroesophageal reflux disease)    • Heart attack (CMS/HCC)    • HLD (hyperlipidemia)    • Hypothyroidism, acquired    • Screening for glaucoma 2007   • Sleep apnea        PAST SURGICAL HISTORY  Past Surgical History:   Procedure Laterality Date   • APPENDECTOMY  1963   • CHOLECYSTECTOMY     • COLONOSCOPY  2006    negative for cancer; polyps   • CORONARY ANGIOPLASTY WITH STENT PLACEMENT  05/14/2011   • KNEE SURGERY  1975   • PROSTATE SURGERY  04/01/2019   • TRANSURETHRAL RESECTION OF BLADDER TUMOR N/A 11/21/2016    Procedure: CYSTOSCOPY TRANSURETHRAL RESECTION OF BLADDER TUMOR;  Surgeon: Piotr Villegas MD;  Location: [facility];  Service:        FAMILY HISTORY  Family History   Problem Relation Age of Onset   • Anxiety disorder Mother    • Depression Mother    • Diabetes Mother    • Heart disease Mother    • Hypertension Mother    • Depression Sister    • Thyroid disease Sister    • Cancer Other         breast; lung; prostate   • Heart disease Other         uncle       SOCIAL HISTORY  Social History     Socioeconomic History   • Marital status:      Spouse name: Not on file   • Number of children: Not on file   • Years of education: Not on file   • Highest education level: Not on file   Tobacco Use   • Smoking status: Former Smoker     Packs/day: 1.00     Years: 40.00     Pack years: 40.00     Types: Cigarettes   • Smokeless tobacco: Never Used   Substance and Sexual Activity   • Alcohol use: No   • Drug use: No   • Sexual activity: Defer       ALLERGIES  Keflex [cephalexin]; Amoxicillin; Buspar [buspirone]; Pristiq [desvenlafaxine];  Prozac [fluoxetine]; Viibryd [vilazodone hcl]; and Zoloft [sertraline]    REVIEW OF SYSTEMS  Review of Systems   Constitutional: Positive for appetite change (decreased). Negative for activity change and fever.   HENT: Positive for congestion. Negative for sore throat.    Eyes: Negative.    Respiratory: Negative for cough and shortness of breath.    Cardiovascular: Negative for chest pain and leg swelling.   Gastrointestinal: Negative for abdominal pain, diarrhea, nausea and vomiting.   Endocrine: Negative.    Genitourinary: Negative for decreased urine volume and dysuria.   Musculoskeletal: Negative for neck pain.   Skin: Negative for rash and wound.   Allergic/Immunologic: Negative.    Neurological: Positive for dizziness. Negative for speech difficulty, weakness, numbness and headaches.   Hematological: Negative.    Psychiatric/Behavioral: Negative.    All other systems reviewed and are negative.      PHYSICAL EXAM  ED Triage Vitals   Temp Heart Rate Resp BP SpO2   09/23/19 1605 09/23/19 1605 09/23/19 1605 09/23/19 1717 09/23/19 1605   98.7 °F (37.1 °C) (!) 130 16 152/99 95 %      Temp src Heart Rate Source Patient Position BP Location FiO2 (%)   09/23/19 1605 09/23/19 1605 09/23/19 1717 09/23/19 1717 --   Tympanic Monitor Sitting Left arm        Physical Exam   Constitutional: He is oriented to person, place, and time. No distress.   HENT:   Head: Normocephalic and atraumatic.   Right Ear: Ear canal normal.   Left Ear: Ear canal normal.   There is a mild amount of fluid behind the R and L TM   Eyes: EOM are normal. Pupils are equal, round, and reactive to light. Right eye exhibits no nystagmus. Left eye exhibits no nystagmus.   Neck: Normal range of motion. Neck supple.   Cardiovascular: Normal rate, normal heart sounds and intact distal pulses. An irregularly irregular rhythm present.   Pulmonary/Chest: Effort normal and breath sounds normal. No respiratory distress.   Abdominal: Soft. Bowel sounds are normal.  He exhibits no distension. There is no tenderness. There is no rebound and no guarding.   Patient does not have hepatomegaly on exam   Musculoskeletal: Normal range of motion. He exhibits no edema.   Neurological: He is alert and oriented to person, place, and time. He has normal sensation, normal strength and intact cranial nerves. He displays no weakness.   The sx are not triggered by rolling the head.  He has normal finger-to-nose bilaterally.  Patient has normal gait without ataxia.   Skin: Skin is warm and dry. No rash noted.   Psychiatric: Mood and affect normal.   Nursing note and vitals reviewed.      LAB RESULTS  Lab Results (last 24 hours)     Procedure Component Value Units Date/Time    CBC & Differential [[id number]] Collected:  09/23/19 1721    Specimen:  Blood Updated:  09/23/19 1737    Narrative:       The following orders were created for panel order CBC & Differential.  Procedure                               Abnormality         Status                     ---------                               -----------         ------                     CBC Auto Differential[[id number]]        Abnormal            Final result                 Please view results for these tests on the individual orders.    Comprehensive Metabolic Panel [[id number]]  (Abnormal) Collected:  09/23/19 1721    Specimen:  Blood Updated:  09/23/19 1753     Glucose 97 mg/dL      BUN 15 mg/dL      Creatinine 1.17 mg/dL      Sodium 141 mmol/L      Potassium 3.7 mmol/L      Chloride 104 mmol/L      CO2 20.9 mmol/L      Calcium 9.0 mg/dL      Total Protein 7.4 g/dL      Albumin 4.70 g/dL      ALT (SGPT) 22 U/L      AST (SGOT) 21 U/L      Alkaline Phosphatase 112 U/L      Total Bilirubin 4.9 mg/dL      eGFR Non African Amer 62 mL/min/1.73      Globulin 2.7 gm/dL      A/G Ratio 1.7 g/dL      BUN/Creatinine Ratio 12.8     Anion Gap 16.1 mmol/L     Narrative:       GFR Normal >60  Chronic Kidney Disease <60  Kidney Failure <15    Troponin  [[id number]]  (Normal) Collected:  09/23/19 1721    Specimen:  Blood Updated:  09/23/19 1753     Troponin T <0.010 ng/mL     Narrative:       Troponin T Reference Range:  <= 0.03 ng/mL-   Negative for AMI  >0.03 ng/mL-     Abnormal for myocardial necrosis.  Clinicians would have to utilize clinical acumen, EKG, Troponin and serial changes to determine if it is an Acute Myocardial Infarction or myocardial injury due to an underlying chronic condition.     Magnesium [[id number]]  (Normal) Collected:  09/23/19 1721    Specimen:  Blood Updated:  09/23/19 1753     Magnesium 2.3 mg/dL     CBC Auto Differential [[id number]]  (Abnormal) Collected:  09/23/19 1721    Specimen:  Blood Updated:  09/23/19 1737     WBC 11.41 10*3/mm3      RBC 5.43 10*6/mm3      Hemoglobin 18.0 g/dL      Hematocrit 51.8 %      MCV 95.4 fL      MCH 33.1 pg      MCHC 34.7 g/dL      RDW 12.7 %      RDW-SD 43.9 fl      MPV 11.8 fL      Platelets 182 10*3/mm3      Neutrophil % 58.2 %      Lymphocyte % 33.3 %      Monocyte % 7.5 %      Eosinophil % 0.3 %      Basophil % 0.3 %      Immature Grans % 0.4 %      Neutrophils, Absolute 6.64 10*3/mm3      Lymphocytes, Absolute 3.80 10*3/mm3      Monocytes, Absolute 0.86 10*3/mm3      Eosinophils, Absolute 0.03 10*3/mm3      Basophils, Absolute 0.03 10*3/mm3      Immature Grans, Absolute 0.05 10*3/mm3      nRBC 0.0 /100 WBC     Bilirubin, Direct [[id number]]  (Normal) Collected:  09/23/19 1721    Specimen:  Blood Updated:  09/23/19 2012     Bilirubin, Direct 0.3 mg/dL     Lactate Dehydrogenase [[id number]]  (Normal) Collected:  09/23/19 1721    Specimen:  Blood Updated:  09/23/19 2155      U/L     Reticulocytes [[id number]]  (Abnormal) Collected:  09/23/19 1721    Specimen:  Blood Updated:  09/23/19 2139     Reticulocyte % 2.28 %      Reticulocyte Absolute 0.1245 10*6/mm3     Urinalysis With Microscopic If Indicated (No Culture) - Urine, Clean Catch [[id number]]  (Abnormal) Collected:  09/23/19 1937     Specimen:  Urine, Clean Catch Updated:  09/23/19 1956     Color, UA Dark Yellow     Appearance, UA Clear     pH, UA <=5.0     Specific Gravity, UA >=1.030     Glucose, UA Negative     Ketones, UA 15 mg/dL (1+)     Bilirubin, UA Negative     Blood, UA Negative     Protein, UA Trace     Leuk Esterase, UA Trace     Nitrite, UA Negative     Urobilinogen, UA 1.0 E.U./dL    Urinalysis, Microscopic Only - Urine, Clean Catch [[id number]]  (Abnormal) Collected:  09/23/19 1935    Specimen:  Urine, Clean Catch Updated:  09/23/19 2207     RBC, UA 0-2 /HPF      WBC, UA 6-12 /HPF      Bacteria, UA None Seen /HPF      Squamous Epithelial Cells, UA 3-6 /HPF      Hyaline Casts, UA 21-30 /LPF      Methodology Manual Light Microscopy          I ordered the above labs and reviewed the results    RADIOLOGY  Ct Head Without Contrast    Result Date: 9/23/2019  CT HEAD WITHOUT CONTRAST  HISTORY: Dizzy.  COMPARISON: None.  FINDINGS: The brain and ventricles are symmetrical. There is no evidence of hemorrhage, hydrocephalus or of abnormal extra-axial fluid. No focal area of decreased attenuation to suggest acute infarction is identified. Mucosal thickening involving the sphenoid sinus is noted. Mild vascular calcification involving the right vertebral artery and the carotid siphons are noted.      No acute process identified. Further evaluation could be performed with a MRI examination of brain as indicated.    Radiation dose reduction techniques were utilized, including automated exposure control and exposure modulation based on body size.        Us Liver    Result Date: 9/23/2019  LIVER ULTRASOUND  HISTORY: Elevated bilirubin  COMPARISON: None available.  TECHNIQUE: Grayscale and color Doppler sonographic images were obtained through the right upper quadrant  FINDINGS: The pancreas is not visualized due to overlying bowel gas. The main portal vein is patent, with hepatopedal flow. There is no intra or extrahepatic biliary dilatation. Common  bile duct measures 6 mm. No focal hepatic lesions are seen. The liver does appear hyperechoic, which may reflect steatosis. Gallbladder is surgically absent. Right kidney measures 11.4 x 7.7 x 5.6 cm. There is no hydronephrosis. Renal echotexture appears normal. No solid or cystic renal masses are seen.      Hepatic steatosis. No intra or extrahepatic biliary dilatation is seen.  This report was finalized on 9/23/2019 10:18 PM by Dr. Marti Bliss M.D.      Xr Chest 1 View    Result Date: 9/23/2019  XR CHEST 1 VW-  HISTORY: Male who is 68 years-old,  weakness  TECHNIQUE: Frontal view of the chest  COMPARISON: 11/15/2014  FINDINGS: Heart size is normal. Aorta is tortuous. Pulmonary vasculature is unremarkable. No focal pulmonary consolidation, pleural effusion, or pneumothorax. Pulmonary hyperinflation suggests COPD. Old granulomatous disease. No acute osseous process.      No focal pulmonary consolidation. COPD change. Follow-up as clinical indications persist.  This report was finalized on 9/23/2019 7:18 PM by Dr. Chico Harding M.D.        I ordered the above noted radiological studies. Interpreted by radiologist. Reviewed by me in PACS.       PROCEDURES  Procedures  EKG                                 EKG time: 1730  Rhythm/Rate: Afib 88  Narrow complex  Nml axis  Nonspecific T wave changes in the inferior leads  nml QT     Interpreted Contemporaneously by me, independently viewed and unchanged compared to prior 11/16/14      PROGRESS AND CONSULTS  ED Course as of Sep 24 0116   Mon Sep 23, 2019   1928 His hemoglobin was 16     6 months ago Hemoglobin: (!) 18.0 [MM]   1929 Previous bilirubin was 1.5  5 months ago.  Patient has normal LFTs and does not have any abdominal pain. Total Bilirubin: (!) 4.9 [MM]   2122 Bilirubin, Direct: 0.3 [MM]   2315 11:15 PM  I spoke with the patient and spouse informed the results of the ultrasound as well as lab work.  All questions were answered.  Patient is asymptomatic  and has had no dizziness here.  Patient is able to ambulate without ataxia and is asymptomatic.  I believe his elevation in his bilirubin level is unrelated to his benign positional vertigo.  Patient is stopping that new antidepressant medicine, Viibryd.  I informed him to follow-up with the GI doctor which I will give him that name.  Instructed him to also follow-up with his primary care doctor.  Gave him warning signs to return to the emergency department.  [MM]   2315 Reticulocyte %: (!) 2.28 [MM]      ED Course User Index  [MM] Darshan Matias MD     1928- Discussed pt's case with the pharmacist in the ED who will review the pt's medications due to elevated Bilirubin. Call placed to GI.    1933- Rechecked pt who is resting comfortably in NAD. Pt denies abd pain PTA to the ED.  Informed pt of the lab and imaging results.     1946- Discussed pt's case with Dr. Manjarrez () who recommends the pt to have an US.     2311- Rechecked pt who is resting comfortably in NAD. Pt is able to ambulate well. Informed pt of the lab and imaging results. D/w pt the plan to DC with instructions to f/u with GI. Informed the pt and the pt's wife the necessary reasons to RTER. Pt and wife understand and agree with plan. All questions answered.          MEDICATIONS GIVEN IN ER  Medications   sodium chloride 0.9 % flush 10 mL (not administered)         MEDICAL DECISION MAKING  Results were reviewed/discussed with the patient and they were also made aware of online access. Pt also made aware that some labs, such as cultures, will not be resulted during ER visit and follow up with PMD is necessary.     MDM  Number of Diagnoses or Management Options  Benign paroxysmal positional vertigo, unspecified laterality:   Hyperbilirubinemia:      Amount and/or Complexity of Data Reviewed  Clinical lab tests: ordered and reviewed (Bilirubin: 4.9  Troponin: Negative  Reticulocytes: 2.28)  Tests in the radiology section of CPT®: ordered and reviewed  (CXR: Heart size is normal. Aorta is tortuous. Pulmonary vasculature is unremarkable. No focal pulmonary consolidation, pleural effusion, or pneumothorax. Pulmonary hyperinflation suggests COPD. Old granulomatous disease. No acute osseous process.   Liver US: Hepatic steatosis. No intra or extrahepatic biliary dilatation is seen.  CT Head: Negative acute. Please see official dictation for full report. )  Tests in the medicine section of CPT®: ordered and reviewed (See EKG procedure note. )  Decide to obtain previous medical records or to obtain history from someone other than the patient: yes  Review and summarize past medical records: yes (Reviewed pt's UC visit from today which stated the pt was c/o of RUQ abd pain at that time.)  Discuss the patient with other providers: yes (Dr. Manjarrez (GI))           DIAGNOSIS  Final diagnoses:   Benign paroxysmal positional vertigo, unspecified laterality   Hyperbilirubinemia       DISPOSITION  DISCHARGE    Patient discharged in stable condition.    Reviewed implications of results, diagnosis, meds, responsibility to follow up, warning signs and symptoms of possible worsening, potential complications and reasons to return to ER.    Patient/Family voiced understanding of above instructions.    Discussed plan for discharge, as there is no emergent indication for admission. Patient referred to primary care provider for BP management due to today's BP. Pt/family is agreeable and understands need for follow up and repeat testing.  Pt is aware that discharge does not mean that nothing is wrong but it indicates no emergency is present that requires admission and they must continue care with follow-up as given below or physician of their choice.     FOLLOW-UP  Nii Manjarrez MD  [address] [id number] [phone]      Call tomorrow morning for appointment this week.  Return if yellow color of the eyes and skin worsens, abdominal pain, vomiting, fever, or any  concerns.    Lane Altamirano MD  [address] [facility]  [address]  [place] [id number]  [phone]    In 1 week  Return if pain worsens, If symptoms worsen, shortness of breath, weakness in extremities, visual change, trouble speaking, abdominal pain, fever, any concerns         Medication List      No changes were made to your prescriptions during this visit.           Latest Documented Vital Signs:  As of 1:16 AM  BP- 126/72 HR- 67 Temp- 98.7 °F (37.1 °C) (Tympanic) O2 sat- 97%    --  Documentation assistance provided by portillo Lizama for Dr. Matias.  Information recorded by the scribe was done at my direction and has been verified and validated by me.       Flavia Lizama  09/23/19 0413       Darshan Matias MD  09/24/19 0546

## 2019-09-24 NOTE — PROGRESS NOTES
Clinical Pharmacy Services: Medication History    Tony Max is a 68 y.o. male presenting to [facility] for No admission diagnoses are documented for this encounter.    He  has a past medical history of Acute sinus infection, Atrial fibrillation (CMS/HCC), Benign essential hypertension, Bladder tumor, Depression, JAIDA (generalized anxiety disorder), GERD (gastroesophageal reflux disease), Heart attack (CMS/HCC), HLD (hyperlipidemia), Hypothyroidism, acquired, Screening for glaucoma (2007), and Sleep apnea.    Allergies as of 09/23/2019 - Reviewed 09/23/2019   Allergen Reaction Noted   • Keflex [cephalexin] Rash 12/22/2015   • Amoxicillin Diarrhea 03/05/2018   • Buspar [buspirone]  12/22/2015   • Pristiq [desvenlafaxine]  12/22/2015   • Prozac [fluoxetine]  12/22/2015   • Viibryd [vilazodone hcl]  12/22/2015   • Zoloft [sertraline]  12/22/2015       Medication information was obtained from: patient       Prior to Admission Medications       Prescriptions Last Dose Informant Patient Reported? Taking?    apixaban (ELIQUIS) 5 MG tablet tablet 9/23/2019  No Yes    Take 1 tablet by mouth 2 (Two) Times a Day.    aspirin 81 MG chewable tablet 9/23/2019  No Yes    Chew 1 tablet Daily.    levothyroxine (SYNTHROID, LEVOTHROID) 200 MCG tablet 9/23/2019  No Yes    Take 1 tablet by mouth Daily.    LORazepam (ATIVAN) 0.5 MG tablet 9/23/2019  No Yes    Take 1 tablet by mouth Every 8 (Eight) Hours As Needed for Anxiety.    metoprolol succinate XL (TOPROL-XL) 25 MG 24 hr tablet 9/23/2019  Yes Yes    Take 25 mg by mouth Daily.    montelukast (SINGULAIR) 10 MG tablet 9/23/2019  No Yes    Take 1 tablet by mouth Every Evening.    nitroglycerin (NITROSTAT) 0.4 MG SL tablet   Yes Yes    Place 0.4 mg under the tongue Every 5 (Five) Minutes As Needed for chest pain. Take no more than 3 doses in 15 minutes.    simvastatin (ZOCOR) 20 MG tablet 9/23/2019  Yes Yes    Take 20 mg by mouth Every Night.    finasteride (PROSCAR) 5 MG  tablet 9/23/2019  Yes No    Take 5 mg by mouth Daily.                Medication notes:   --patient took pristiqu for 5 days last dose was Friday  -- took Viibrid Sat and Monday, but doctor told him to stop  -- was on risperidone for 2 months which was dcd last wk week    This medication list is complete to the best of my knowledge as of 9/23/2019    Please call if questions.    Rabia Oro, PharmD, BCPS  9/23/2019 8:28 PM

## 2019-09-24 NOTE — PROGRESS NOTES
Clinical Pharmacy Services: Medication History    Tony Max is a 68 y.o. male presenting to [facility] for No admission diagnoses are documented for this encounter.    He  has a past medical history of Acute sinus infection, Atrial fibrillation (CMS/HCC), Benign essential hypertension, Bladder tumor, Depression, JAIDA (generalized anxiety disorder), GERD (gastroesophageal reflux disease), Heart attack (CMS/HCC), HLD (hyperlipidemia), Hypothyroidism, acquired, Screening for glaucoma (2007), and Sleep apnea.    Allergies as of 09/23/2019 - Reviewed 09/23/2019   Allergen Reaction Noted   • Keflex [cephalexin] Rash 12/22/2015   • Amoxicillin Diarrhea 03/05/2018   • Buspar [buspirone]  12/22/2015   • Pristiq [desvenlafaxine]  12/22/2015   • Prozac [fluoxetine]  12/22/2015   • Viibryd [vilazodone hcl]  12/22/2015   • Zoloft [sertraline]  12/22/2015       Medication information was obtained from: patient       Prior to Admission Medications       Prescriptions Last Dose Informant Patient Reported? Taking?    apixaban (ELIQUIS) 5 MG tablet tablet 9/23/2019  No Yes    Take 1 tablet by mouth 2 (Two) Times a Day.    aspirin 81 MG chewable tablet 9/23/2019  No Yes    Chew 1 tablet Daily.    levothyroxine (SYNTHROID, LEVOTHROID) 200 MCG tablet 9/23/2019  No Yes    Take 1 tablet by mouth Daily.    LORazepam (ATIVAN) 0.5 MG tablet 9/23/2019  No Yes    Take 1 tablet by mouth Every 8 (Eight) Hours As Needed for Anxiety.    metoprolol succinate XL (TOPROL-XL) 25 MG 24 hr tablet 9/23/2019  Yes Yes    Take 25 mg by mouth Daily.    montelukast (SINGULAIR) 10 MG tablet 9/23/2019  No Yes    Take 1 tablet by mouth Every Evening.    nitroglycerin (NITROSTAT) 0.4 MG SL tablet   Yes Yes    Place 0.4 mg under the tongue Every 5 (Five) Minutes As Needed for chest pain. Take no more than 3 doses in 15 minutes.    simvastatin (ZOCOR) 20 MG tablet 9/23/2019  Yes Yes    Take 20 mg by mouth Every Night.    finasteride (PROSCAR) 5 MG  tablet 9/23/2019  Yes No    Take 5 mg by mouth Daily.                Medication notes:   --patient took pristiqu for 5 days last dose was Friday  -- took Viibrid Sat and Monday, but doctor told him to stop  -- was on risperidone for 2 months which was dcd last wk week    This medication list is complete to the best of my knowledge as of 9/23/2019    Please call if questions.    Rabia Oro, PharmD, BCPS  9/23/2019 8:28 PM

## 2019-09-30 ENCOUNTER — OFFICE VISIT (OUTPATIENT)
Dept: GASTROENTEROLOGY | Facility: CLINIC | Age: 69
End: 2019-09-30

## 2019-09-30 VITALS
DIASTOLIC BLOOD PRESSURE: 84 MMHG | WEIGHT: 265.6 LBS | BODY MASS INDEX: 34.08 KG/M2 | HEIGHT: 74 IN | SYSTOLIC BLOOD PRESSURE: 136 MMHG | TEMPERATURE: 97.7 F

## 2019-09-30 DIAGNOSIS — E80.4 GILBERT'S SYNDROME: Primary | ICD-10-CM

## 2019-09-30 PROCEDURE — 99203 OFFICE O/P NEW LOW 30 MIN: CPT | Performed by: INTERNAL MEDICINE

## 2019-10-03 ENCOUNTER — TELEPHONE (OUTPATIENT)
Dept: GASTROENTEROLOGY | Facility: CLINIC | Age: 69
End: 2019-10-03

## 2019-10-03 ENCOUNTER — RESULTS ENCOUNTER (OUTPATIENT)
Dept: GASTROENTEROLOGY | Facility: CLINIC | Age: 69
End: 2019-10-03

## 2019-10-03 DIAGNOSIS — E80.4 GILBERT'S SYNDROME: ICD-10-CM

## 2019-10-03 NOTE — TELEPHONE ENCOUNTER
----- Message from Eliot Hassan MD sent at 10/2/2019  4:12 PM EDT -----  Can we have pt come in for [id number] genotype - order has been placed, we are able to do this through [employer]

## 2019-10-25 NOTE — TELEPHONE ENCOUNTER
Cosme Mendoza RegSched Rep  [facility] [address] [place]   Phone Number: [phone]             Pt called back

## 2019-10-25 NOTE — TELEPHONE ENCOUNTER
returned patient's phone call. He states he continues with vertigo and will call back after the first of the year to schedule a lab appointment.

## 2019-12-19 ENCOUNTER — TELEPHONE (OUTPATIENT)
Dept: FAMILY MEDICINE CLINIC | Facility: CLINIC | Age: 69
End: 2019-12-19

## 2019-12-19 DIAGNOSIS — R73.9 ELEVATED BLOOD SUGAR: ICD-10-CM

## 2019-12-19 DIAGNOSIS — E78.2 MIXED HYPERLIPIDEMIA: Primary | ICD-10-CM

## 2019-12-19 DIAGNOSIS — I10 BENIGN ESSENTIAL HYPERTENSION: ICD-10-CM

## 2019-12-19 DIAGNOSIS — E03.9 HYPOTHYROIDISM, ACQUIRED: ICD-10-CM

## 2020-01-13 DIAGNOSIS — R73.9 ELEVATED BLOOD SUGAR: ICD-10-CM

## 2020-01-13 DIAGNOSIS — I10 BENIGN ESSENTIAL HYPERTENSION: ICD-10-CM

## 2020-01-13 DIAGNOSIS — E78.2 MIXED HYPERLIPIDEMIA: ICD-10-CM

## 2020-01-13 DIAGNOSIS — E03.9 HYPOTHYROIDISM, ACQUIRED: ICD-10-CM

## 2020-01-14 LAB
ALBUMIN SERPL-MCNC: 4.3 G/DL (ref 3.5–4.8)
ALBUMIN/CREAT UR: 13.1 MG/G CREAT (ref 0–30)
ALBUMIN/GLOB SERPL: 2 {RATIO} (ref 1.2–2.2)
ALP SERPL-CCNC: 123 IU/L (ref 39–117)
ALT SERPL-CCNC: 22 IU/L (ref 0–44)
AST SERPL-CCNC: 15 IU/L (ref 0–40)
BASOPHILS # BLD AUTO: 0 X10E3/UL (ref 0–0.2)
BASOPHILS NFR BLD AUTO: 0 %
BILIRUB SERPL-MCNC: 2.3 MG/DL (ref 0–1.2)
BUN SERPL-MCNC: 19 MG/DL (ref 8–27)
BUN/CREAT SERPL: 16 (ref 10–24)
CALCIUM SERPL-MCNC: 8.7 MG/DL (ref 8.6–10.2)
CHLORIDE SERPL-SCNC: 107 MMOL/L (ref 96–106)
CHOLEST SERPL-MCNC: 109 MG/DL (ref 100–199)
CO2 SERPL-SCNC: 23 MMOL/L (ref 20–29)
CREAT SERPL-MCNC: 1.19 MG/DL (ref 0.76–1.27)
CREAT UR-MCNC: 295.1 MG/DL
EOSINOPHIL # BLD AUTO: 0.1 X10E3/UL (ref 0–0.4)
EOSINOPHIL NFR BLD AUTO: 1 %
ERYTHROCYTE [DISTWIDTH] IN BLOOD BY AUTOMATED COUNT: 14.4 % (ref 11.6–15.4)
GLOBULIN SER CALC-MCNC: 2.1 G/DL (ref 1.5–4.5)
GLUCOSE SERPL-MCNC: 103 MG/DL (ref 65–99)
HBA1C MFR BLD: 5.5 % (ref 4.8–5.6)
HCT VFR BLD AUTO: 49.6 % (ref 37.5–51)
HDLC SERPL-MCNC: 38 MG/DL
HGB BLD-MCNC: 16.8 G/DL (ref 13–17.7)
IMM GRANULOCYTES # BLD AUTO: 0 X10E3/UL (ref 0–0.1)
IMM GRANULOCYTES NFR BLD AUTO: 0 %
LDLC SERPL CALC-MCNC: 54 MG/DL (ref 0–99)
LYMPHOCYTES # BLD AUTO: 3.4 X10E3/UL (ref 0.7–3.1)
LYMPHOCYTES NFR BLD AUTO: 39 %
MCH RBC QN AUTO: 33.7 PG (ref 26.6–33)
MCHC RBC AUTO-ENTMCNC: 33.9 G/DL (ref 31.5–35.7)
MCV RBC AUTO: 99 FL (ref 79–97)
MICROALBUMIN UR-MCNC: 38.6 UG/ML
MONOCYTES # BLD AUTO: 0.9 X10E3/UL (ref 0.1–0.9)
MONOCYTES NFR BLD AUTO: 10 %
NEUTROPHILS # BLD AUTO: 4.4 X10E3/UL (ref 1.4–7)
NEUTROPHILS NFR BLD AUTO: 50 %
PLATELET # BLD AUTO: 165 X10E3/UL (ref 150–450)
POTASSIUM SERPL-SCNC: 4.5 MMOL/L (ref 3.5–5.2)
PROT SERPL-MCNC: 6.4 G/DL (ref 6–8.5)
RBC # BLD AUTO: 4.99 X10E6/UL (ref 4.14–5.8)
SODIUM SERPL-SCNC: 145 MMOL/L (ref 134–144)
TRIGL SERPL-MCNC: 86 MG/DL (ref 0–149)
TSH SERPL DL<=0.005 MIU/L-ACNC: 0.56 UIU/ML (ref 0.45–4.5)
VLDLC SERPL CALC-MCNC: 17 MG/DL (ref 5–40)
WBC # BLD AUTO: 8.8 X10E3/UL (ref 3.4–10.8)

## 2020-02-26 ENCOUNTER — OFFICE VISIT (OUTPATIENT)
Dept: FAMILY MEDICINE CLINIC | Facility: CLINIC | Age: 70
End: 2020-02-26

## 2020-02-26 VITALS
HEIGHT: 74 IN | DIASTOLIC BLOOD PRESSURE: 91 MMHG | TEMPERATURE: 97.5 F | RESPIRATION RATE: 16 BRPM | HEART RATE: 88 BPM | WEIGHT: 281 LBS | OXYGEN SATURATION: 98 % | BODY MASS INDEX: 36.06 KG/M2 | SYSTOLIC BLOOD PRESSURE: 135 MMHG

## 2020-02-26 DIAGNOSIS — F41.1 GAD (GENERALIZED ANXIETY DISORDER): ICD-10-CM

## 2020-02-26 DIAGNOSIS — E03.9 HYPOTHYROIDISM, UNSPECIFIED TYPE: ICD-10-CM

## 2020-02-26 DIAGNOSIS — J30.9 ALLERGIC RHINITIS, UNSPECIFIED SEASONALITY, UNSPECIFIED TRIGGER: ICD-10-CM

## 2020-02-26 PROCEDURE — 99213 OFFICE O/P EST LOW 20 MIN: CPT | Performed by: FAMILY MEDICINE

## 2020-02-26 RX ORDER — MONTELUKAST SODIUM 10 MG/1
10 TABLET ORAL EVERY EVENING
Qty: 90 TABLET | Refills: 1 | Status: SHIPPED | OUTPATIENT
Start: 2020-02-26 | End: 2020-08-28 | Stop reason: SDUPTHER

## 2020-02-26 RX ORDER — VENLAFAXINE 75 MG/1
75 TABLET ORAL DAILY
COMMUNITY
End: 2020-08-28 | Stop reason: ALTCHOICE

## 2020-02-26 RX ORDER — LORAZEPAM 0.5 MG/1
0.5 TABLET ORAL EVERY 8 HOURS PRN
Qty: 30 TABLET | Refills: 2 | Status: CANCELLED | OUTPATIENT
Start: 2020-02-26

## 2020-02-26 RX ORDER — LEVOTHYROXINE SODIUM 0.2 MG/1
200 TABLET ORAL DAILY
Qty: 90 TABLET | Refills: 1 | Status: SHIPPED | OUTPATIENT
Start: 2020-02-26 | End: 2020-08-28 | Stop reason: SDUPTHER

## 2020-02-26 NOTE — PROGRESS NOTES
Subjective   Chief Complaint:   Chief Complaint   Patient presents with   • Hypothyroidism   • Allergies         History of Present Illness is to the office to refill his medications and go over his labs he is on Synthroid and singular both are good and labs are good on the thyroid.  He said he has some chest congestion but I listen to his chest and his chest is clear I not hear any rales or any congestion so is going to call me if it gets worse.  His chest is clear he is going to call me if he gets more congested or changes.  I refilled his Synthroid and his Singulair and again his chest is clear and his labs are great   seen for serology for CT scan and follow-up.        Past Medical History:   Diagnosis Date   • Acute sinus infection     RECENT ANTIBIOTIC   • Atrial fibrillation (CMS/HCC)    • Benign essential hypertension    • Bladder tumor    • Depression    • JAIDA (generalized anxiety disorder)    • GERD (gastroesophageal reflux disease)    • Heart attack (CMS/HCC)    • HLD (hyperlipidemia)    • Hypothyroidism, acquired    • Screening for glaucoma 2007    negative   • Sleep apnea     C-PAP        Tony Max 69 y.o. male who presents today for Medical Management of the below listed issues and medication refills.    ICD-10-CM ICD-9-CM   1. Allergic rhinitis, unspecified seasonality, unspecified trigger J30.9 477.9   2. JAIDA (generalized anxiety disorder) F41.1 300.02   3. Hypothyroidism, unspecified type E03.9 244.9        he has a problem list of   Patient Active Problem List   Diagnosis   • JAIDA (generalized anxiety disorder)   • Atrial fibrillation (CMS/HCC)   • Depression   • GERD (gastroesophageal reflux disease)   • Heart attack (CMS/HCC)   • HLD (hyperlipidemia)   • Benign essential hypertension   • Hypothyroidism, acquired   • Sleep apnea   • Hypothyroidism   • History of BPH   • Elevated blood sugar   • Seasonal allergies   • Acute maxillary sinusitis   • UTI (urinary tract infection)   • Class 2 severe  "obesity due to excess calories with serious comorbidity and body mass index (BMI) of 38.0 to 38.9 in adult (CMS/[facility])   • Atypical chest pain   • Chronic anticoagulation   • Non-ST elevated myocardial infarction (non-STEMI) (CMS/[facility])   .    he has been compliant with   Current Outpatient Medications:   •  apixaban (ELIQUIS) 5 MG tablet tablet, Take 1 tablet by mouth 2 (Two) Times a Day., Disp: 60 tablet, Rfl:   •  aspirin 81 MG chewable tablet, Chew 1 tablet Daily., Disp: , Rfl:   •  finasteride (PROSCAR) 5 MG tablet, Take 5 mg by mouth Daily., Disp: , Rfl:   •  levothyroxine (SYNTHROID, LEVOTHROID) 200 MCG tablet, Take 1 tablet by mouth Daily., Disp: 90 tablet, Rfl: 1  •  LORazepam (ATIVAN) 0.5 MG tablet, Take 1 tablet by mouth Every 8 (Eight) Hours As Needed for Anxiety., Disp: 30 tablet, Rfl: 2  •  metoprolol succinate XL (TOPROL-XL) 25 MG 24 hr tablet, Take 25 mg by mouth Daily., Disp: , Rfl:   •  montelukast (SINGULAIR) 10 MG tablet, Take 1 tablet by mouth Every Evening., Disp: 90 tablet, Rfl: 1  •  nitroglycerin (NITROSTAT) 0.4 MG SL tablet, Place 0.4 mg under the tongue Every 5 (Five) Minutes As Needed for chest pain. Take no more than 3 doses in 15 minutes., Disp: , Rfl:   •  simvastatin (ZOCOR) 20 MG tablet, Take 20 mg by mouth Every Night., Disp: , Rfl:   •  venlafaxine (EFFEXOR) 75 MG tablet, Take 75 mg by mouth Daily., Disp: , Rfl: .  he denies medication side effects.        /91   Pulse 88   Temp 97.5 °F (36.4 °C)   Resp 16   Ht 188 cm (74\")   Wt 127 kg (281 lb)   SpO2 98%   BMI 36.08 kg/m²     Results for orders placed or performed in visit on 01/13/20   Microalbumin / Creatinine Urine Ratio - Urine, Clean Catch   Result Value Ref Range    Creatinine, Urine 295.1 Not Estab. mg/dL    Microalbumin, Urine 38.6 Not Estab. ug/mL    Microalbumin/Creatinine Ratio 13.1 0.0 - 30.0 mg/g creat   Hemoglobin A1c   Result Value Ref Range    Hemoglobin A1C 5.5 4.8 - 5.6 %   TSH   Result Value Ref Range    " TSH 0.561 0.450 - 4.500 uIU/mL   Comprehensive Metabolic Panel   Result Value Ref Range    Glucose 103 (H) 65 - 99 mg/dL    BUN 19 8 - 27 mg/dL    Creatinine 1.19 0.76 - 1.27 mg/dL    eGFR Non African Am 62 >59 mL/min/1.73    eGFR African Am 71 >59 mL/min/1.73    BUN/Creatinine Ratio 16 10 - 24    Sodium 145 (H) 134 - 144 mmol/L    Potassium 4.5 3.5 - 5.2 mmol/L    Chloride 107 (H) 96 - 106 mmol/L    Total CO2 23 20 - 29 mmol/L    Calcium 8.7 8.6 - 10.2 mg/dL    Total Protein 6.4 6.0 - 8.5 g/dL    Albumin 4.3 3.5 - 4.8 g/dL    Globulin 2.1 1.5 - 4.5 g/dL    A/G Ratio 2.0 1.2 - 2.2    Total Bilirubin 2.3 (H) 0.0 - 1.2 mg/dL    Alkaline Phosphatase 123 (H) 39 - 117 IU/L    AST (SGOT) 15 0 - 40 IU/L    ALT (SGPT) 22 0 - 44 IU/L   Lipid Panel   Result Value Ref Range    Total Cholesterol 109 100 - 199 mg/dL    Triglycerides 86 0 - 149 mg/dL    HDL Cholesterol 38 (L) >39 mg/dL    VLDL Cholesterol 17 5 - 40 mg/dL    LDL Cholesterol  54 0 - 99 mg/dL   CBC & Differential   Result Value Ref Range    WBC 8.8 3.4 - 10.8 x10E3/uL    RBC 4.99 4.14 - 5.80 x10E6/uL    Hemoglobin 16.8 13.0 - 17.7 g/dL    Hematocrit 49.6 37.5 - 51.0 %    MCV 99 (H) 79 - 97 fL    MCH 33.7 (H) 26.6 - 33.0 pg    MCHC 33.9 31.5 - 35.7 g/dL    RDW 14.4 11.6 - 15.4 %    Platelets 165 150 - 450 x10E3/uL    Neutrophil Rel % 50 Not Estab. %    Lymphocyte Rel % 39 Not Estab. %    Monocyte Rel % 10 Not Estab. %    Eosinophil Rel % 1 Not Estab. %    Basophil Rel % 0 Not Estab. %    Neutrophils Absolute 4.4 1.4 - 7.0 x10E3/uL    Lymphocytes Absolute 3.4 (H) 0.7 - 3.1 x10E3/uL    Monocytes Absolute 0.9 0.1 - 0.9 x10E3/uL    Eosinophils Absolute 0.1 0.0 - 0.4 x10E3/uL    Basophils Absolute 0.0 0.0 - 0.2 x10E3/uL    Immature Granulocyte Rel % 0 Not Estab. %    Immature Grans Absolute 0.0 0.0 - 0.1 x10E3/uL       The following portions of the patient's history were reviewed and updated as appropriate: allergies, current medications, past family history, past medical  history, past social history, past surgical history and problem list.      he has a history of   Patient Active Problem List   Diagnosis   • JAIDA (generalized anxiety disorder)   • Atrial fibrillation (CMS/[facility])   • Depression   • GERD (gastroesophageal reflux disease)   • Heart attack (CMS/[facility])   • HLD (hyperlipidemia)   • Benign essential hypertension   • Hypothyroidism, acquired   • Sleep apnea   • Hypothyroidism   • History of BPH   • Elevated blood sugar   • Seasonal allergies   • Acute maxillary sinusitis   • UTI (urinary tract infection)   • Class 2 severe obesity due to excess calories with serious comorbidity and body mass index (BMI) of 38.0 to 38.9 in adult (CMS/[facility])   • Atypical chest pain   • Chronic anticoagulation   • Non-ST elevated myocardial infarction (non-STEMI) (CMS/[facility])       Review of Systems   Constitutional: Negative for activity change, appetite change and unexpected weight change.   Eyes: Negative for visual disturbance.   Respiratory: Negative for chest tightness and shortness of breath.    Cardiovascular: Negative for chest pain and palpitations.   Skin: Negative for color change.   Neurological: Negative for syncope and speech difficulty.   Psychiatric/Behavioral: Negative for confusion and decreased concentration.       Objective   Physical Exam   Constitutional: He is oriented to person, place, and time. He appears well-developed and well-nourished.   HENT:   Head: Atraumatic.   Mouth/Throat: Oropharynx is clear and moist.   Eyes: Pupils are equal, round, and reactive to light. EOM are normal.   Neck: Normal range of motion. Neck supple. No thyromegaly present.   Cardiovascular: Normal rate and regular rhythm.   Pulmonary/Chest: Effort normal and breath sounds normal. No respiratory distress. He has no wheezes. He has no rales.   Abdominal: Soft. There is no tenderness.   Musculoskeletal: Normal range of motion.   Neurological: He is alert and oriented to person, place, and time.    Skin: Skin is warm and dry.   Psychiatric: He has a normal mood and affect. His behavior is normal.   Nursing note and vitals reviewed.      Assessment/Plan   Tony was seen today for hypothyroidism and allergies.    Diagnoses and all orders for this visit:    Allergic rhinitis, unspecified seasonality, unspecified trigger  -     montelukast (SINGULAIR) 10 MG tablet; Take 1 tablet by mouth Every Evening.    JAIDA (generalized anxiety disorder)    Hypothyroidism, unspecified type  -     levothyroxine (SYNTHROID, LEVOTHROID) 200 MCG tablet; Take 1 tablet by mouth Daily.

## 2020-05-21 RX ORDER — ALBUTEROL SULFATE 90 UG/1
AEROSOL, METERED RESPIRATORY (INHALATION)
Qty: 18 G | Refills: 0 | OUTPATIENT
Start: 2020-05-21 | End: 2020-08-06

## 2020-08-28 ENCOUNTER — OFFICE VISIT (OUTPATIENT)
Dept: FAMILY MEDICINE CLINIC | Facility: CLINIC | Age: 70
End: 2020-08-28

## 2020-08-28 VITALS
SYSTOLIC BLOOD PRESSURE: 133 MMHG | OXYGEN SATURATION: 99 % | HEART RATE: 80 BPM | HEIGHT: 74 IN | WEIGHT: 283 LBS | TEMPERATURE: 97.3 F | DIASTOLIC BLOOD PRESSURE: 91 MMHG | BODY MASS INDEX: 36.32 KG/M2 | RESPIRATION RATE: 16 BRPM

## 2020-08-28 DIAGNOSIS — J30.9 ALLERGIC RHINITIS, UNSPECIFIED SEASONALITY, UNSPECIFIED TRIGGER: ICD-10-CM

## 2020-08-28 DIAGNOSIS — E66.01 CLASS 2 SEVERE OBESITY DUE TO EXCESS CALORIES WITH SERIOUS COMORBIDITY AND BODY MASS INDEX (BMI) OF 38.0 TO 38.9 IN ADULT (HCC): ICD-10-CM

## 2020-08-28 DIAGNOSIS — G47.30 SLEEP APNEA, UNSPECIFIED TYPE: ICD-10-CM

## 2020-08-28 DIAGNOSIS — I48.0 PAROXYSMAL ATRIAL FIBRILLATION (HCC): ICD-10-CM

## 2020-08-28 DIAGNOSIS — Z79.01 CHRONIC ANTICOAGULATION: ICD-10-CM

## 2020-08-28 DIAGNOSIS — J01.00 ACUTE MAXILLARY SINUSITIS, RECURRENCE NOT SPECIFIED: Primary | ICD-10-CM

## 2020-08-28 DIAGNOSIS — E03.9 HYPOTHYROIDISM, UNSPECIFIED TYPE: ICD-10-CM

## 2020-08-28 PROCEDURE — 99213 OFFICE O/P EST LOW 20 MIN: CPT | Performed by: FAMILY MEDICINE

## 2020-08-28 RX ORDER — ALBUTEROL SULFATE 90 UG/1
2 AEROSOL, METERED RESPIRATORY (INHALATION) EVERY 4 HOURS PRN
COMMUNITY
End: 2020-08-28 | Stop reason: SDUPTHER

## 2020-08-28 RX ORDER — MONTELUKAST SODIUM 10 MG/1
10 TABLET ORAL EVERY EVENING
Qty: 90 TABLET | Refills: 1 | Status: SHIPPED | OUTPATIENT
Start: 2020-08-28 | End: 2021-03-01 | Stop reason: SDUPTHER

## 2020-08-28 RX ORDER — ALBUTEROL SULFATE 90 UG/1
2 AEROSOL, METERED RESPIRATORY (INHALATION) EVERY 4 HOURS PRN
Qty: 18 G | Refills: 2 | Status: SHIPPED | OUTPATIENT
Start: 2020-08-28 | End: 2022-01-20

## 2020-08-28 RX ORDER — AMOXICILLIN AND CLAVULANATE POTASSIUM 875; 125 MG/1; MG/1
1 TABLET, FILM COATED ORAL 2 TIMES DAILY
Qty: 20 TABLET | Refills: 0 | Status: SHIPPED | OUTPATIENT
Start: 2020-08-28 | End: 2020-11-16

## 2020-08-28 RX ORDER — PAROXETINE HYDROCHLORIDE 20 MG/1
20 TABLET, FILM COATED ORAL
COMMUNITY
Start: 2020-07-07 | End: 2020-11-16

## 2020-08-28 RX ORDER — LEVOTHYROXINE SODIUM 0.2 MG/1
200 TABLET ORAL DAILY
Qty: 90 TABLET | Refills: 1 | Status: SHIPPED | OUTPATIENT
Start: 2020-08-28 | End: 2021-03-01 | Stop reason: SDUPTHER

## 2020-08-28 NOTE — PROGRESS NOTES
Subjective   Chief Complaint:   Chief Complaint   Patient presents with   • Sinusitis   • Hypothyroidism   • Hyperlipidemia         History of Present Illness   patient comes in today to review the treatment for left maxillary sinusitis he is tender in his left maxillary sinus and is congested he is going to take Augmentin 875 1 twice daily for 10 days and Flonase nasal spray and singular tablets 10 mg  q day for congestion he is taken Augmentin before without any problems on exam he is tender and congested in his left maxillary sinus but his ears and throat look okay and his chest is clear so is going to return if he has any problems with this sinusitis or  with the medication. I asked patient about lung ct scan screening and he is to call me back Monday. And give answer to  nurse.      Past Medical History:   Diagnosis Date   • Acute sinus infection     RECENT ANTIBIOTIC   • Atrial fibrillation (CMS/HCC)    • Benign essential hypertension    • Bladder tumor    • Depression    • JAIDA (generalized anxiety disorder)    • GERD (gastroesophageal reflux disease)    • Heart attack (CMS/[facility])    • HLD (hyperlipidemia)    • Hypothyroidism, acquired    • Screening for glaucoma 2007    negative   • Sleep apnea     C-PAP        Tony Max 69 y.o. male who presents today for Medical Management of the below listed issues and medication refills.    ICD-10-CM ICD-9-CM   1. Acute maxillary sinusitis, recurrence not specified J01.00 461.0   2. Allergic rhinitis, unspecified seasonality, unspecified trigger J30.9 477.9   3. Hypothyroidism, unspecified type E03.9 244.9   4. Paroxysmal atrial fibrillation (CMS/[facility]) I48.0 427.31   5. Class 2 severe obesity due to excess calories with serious comorbidity and body mass index (BMI) of 38.0 to 38.9 in adult (CMS/[facility]) E66.01 278.01    Z68.38 V85.38   6. Sleep apnea, unspecified type G47.30 780.57   7. Chronic anticoagulation Z79.01 V58.61   3       sinusitis     he has a problem list of    Patient Active Problem List   Diagnosis   • JAIDA (generalized anxiety disorder)   • Atrial fibrillation (CMS/[facility])   • Depression   • GERD (gastroesophageal reflux disease)   • Heart attack (CMS/[facility])   • HLD (hyperlipidemia)   • Benign essential hypertension   • Hypothyroidism, acquired   • Sleep apnea   • Hypothyroidism   • History of BPH   • Elevated blood sugar   • Seasonal allergies   • Acute maxillary sinusitis   • UTI (urinary tract infection)   • Class 2 severe obesity due to excess calories with serious comorbidity and body mass index (BMI) of 38.0 to 38.9 in adult (CMS/[facility])   • Atypical chest pain   • Chronic anticoagulation   • Non-ST elevated myocardial infarction (non-STEMI) (CMS/[facility])   • Allergic rhinitis   .    he has been compliant with   Current Outpatient Medications:   •  albuterol sulfate  (90 Base) MCG/ACT inhaler, Inhale 2 puffs Every 4 (Four) Hours As Needed for Wheezing., Disp: 18 g, Rfl: 2  •  apixaban (ELIQUIS) 5 MG tablet tablet, Take 1 tablet by mouth 2 (Two) Times a Day., Disp: 60 tablet, Rfl:   •  aspirin 81 MG chewable tablet, Chew 1 tablet Daily., Disp: , Rfl:   •  finasteride (PROSCAR) 5 MG tablet, Take 5 mg by mouth Daily., Disp: , Rfl:   •  fluticasone (FLONASE) 50 MCG/ACT nasal spray, 2 sprays into the nostril(s) as directed by provider Daily., Disp: 1 bottle, Rfl: 0  •  levothyroxine (SYNTHROID, LEVOTHROID) 200 MCG tablet, Take 1 tablet by mouth Daily., Disp: 90 tablet, Rfl: 1  •  LORazepam (ATIVAN) 0.5 MG tablet, Take 1 tablet by mouth Every 8 (Eight) Hours As Needed for Anxiety., Disp: 30 tablet, Rfl: 2  •  metoprolol succinate XL (TOPROL-XL) 25 MG 24 hr tablet, Take 25 mg by mouth Daily., Disp: , Rfl:   •  montelukast (SINGULAIR) 10 MG tablet, Take 1 tablet by mouth Every Evening., Disp: 90 tablet, Rfl: 1  •  nitroglycerin (NITROSTAT) 0.4 MG SL tablet, Place 0.4 mg under the tongue Every 5 (Five) Minutes As Needed for chest pain. Take no more than 3 doses in 15 minutes.,  "Disp: , Rfl:   •  simvastatin (ZOCOR) 20 MG tablet, Take 20 mg by mouth Every Night., Disp: , Rfl:   •  amoxicillin-clavulanate (Augmentin) 875-125 MG per tablet, Take 1 tablet by mouth 2 (Two) Times a Day., Disp: 20 tablet, Rfl: 0  •  PARoxetine (PAXIL) 20 MG tablet, Take 20 mg by mouth every night at bedtime., Disp: , Rfl: .  he denies medication side effects.        /91   Pulse 80   Temp 97.3 °F (36.3 °C)   Resp 16   Ht 188 cm (74\")   Wt 128 kg (283 lb)   SpO2 99%   BMI 36.34 kg/m²     Results for orders placed or performed in visit on 01/13/20   Microalbumin / Creatinine Urine Ratio - Urine, Clean Catch   Result Value Ref Range    Creatinine, Urine 295.1 Not Estab. mg/dL    Microalbumin, Urine 38.6 Not Estab. ug/mL    Microalbumin/Creatinine Ratio 13.1 0.0 - 30.0 mg/g creat   Hemoglobin A1c   Result Value Ref Range    Hemoglobin A1C 5.5 4.8 - 5.6 %   TSH   Result Value Ref Range    TSH 0.561 0.450 - 4.500 uIU/mL   Comprehensive Metabolic Panel   Result Value Ref Range    Glucose 103 (H) 65 - 99 mg/dL    BUN 19 8 - 27 mg/dL    Creatinine 1.19 0.76 - 1.27 mg/dL    eGFR Non African Am 62 >59 mL/min/1.73    eGFR African Am 71 >59 mL/min/1.73    BUN/Creatinine Ratio 16 10 - 24    Sodium 145 (H) 134 - 144 mmol/L    Potassium 4.5 3.5 - 5.2 mmol/L    Chloride 107 (H) 96 - 106 mmol/L    Total CO2 23 20 - 29 mmol/L    Calcium 8.7 8.6 - 10.2 mg/dL    Total Protein 6.4 6.0 - 8.5 g/dL    Albumin 4.3 3.5 - 4.8 g/dL    Globulin 2.1 1.5 - 4.5 g/dL    A/G Ratio 2.0 1.2 - 2.2    Total Bilirubin 2.3 (H) 0.0 - 1.2 mg/dL    Alkaline Phosphatase 123 (H) 39 - 117 IU/L    AST (SGOT) 15 0 - 40 IU/L    ALT (SGPT) 22 0 - 44 IU/L   Lipid Panel   Result Value Ref Range    Total Cholesterol 109 100 - 199 mg/dL    Triglycerides 86 0 - 149 mg/dL    HDL Cholesterol 38 (L) >39 mg/dL    VLDL Cholesterol 17 5 - 40 mg/dL    LDL Cholesterol  54 0 - 99 mg/dL   CBC & Differential   Result Value Ref Range    WBC 8.8 3.4 - 10.8 x10E3/uL    " RBC 4.99 4.14 - 5.80 x10E6/uL    Hemoglobin 16.8 13.0 - 17.7 g/dL    Hematocrit 49.6 37.5 - 51.0 %    MCV 99 (H) 79 - 97 fL    MCH 33.7 (H) 26.6 - 33.0 pg    MCHC 33.9 31.5 - 35.7 g/dL    RDW 14.4 11.6 - 15.4 %    Platelets 165 150 - 450 x10E3/uL    Neutrophil Rel % 50 Not Estab. %    Lymphocyte Rel % 39 Not Estab. %    Monocyte Rel % 10 Not Estab. %    Eosinophil Rel % 1 Not Estab. %    Basophil Rel % 0 Not Estab. %    Neutrophils Absolute 4.4 1.4 - 7.0 x10E3/uL    Lymphocytes Absolute 3.4 (H) 0.7 - 3.1 x10E3/uL    Monocytes Absolute 0.9 0.1 - 0.9 x10E3/uL    Eosinophils Absolute 0.1 0.0 - 0.4 x10E3/uL    Basophils Absolute 0.0 0.0 - 0.2 x10E3/uL    Immature Granulocyte Rel % 0 Not Estab. %    Immature Grans Absolute 0.0 0.0 - 0.1 x10E3/uL       The following portions of the patient's history were reviewed and updated as appropriate: allergies, current medications, past family history, past medical history, past social history, past surgical history and problem list.      he has a history of   Patient Active Problem List   Diagnosis   • JAIDA (generalized anxiety disorder)   • Atrial fibrillation (CMS/[facility])   • Depression   • GERD (gastroesophageal reflux disease)   • Heart attack (CMS/[facility])   • HLD (hyperlipidemia)   • Benign essential hypertension   • Hypothyroidism, acquired   • Sleep apnea   • Hypothyroidism   • History of BPH   • Elevated blood sugar   • Seasonal allergies   • Acute maxillary sinusitis   • UTI (urinary tract infection)   • Class 2 severe obesity due to excess calories with serious comorbidity and body mass index (BMI) of 38.0 to 38.9 in adult (CMS/[facility])   • Atypical chest pain   • Chronic anticoagulation   • Non-ST elevated myocardial infarction (non-STEMI) (CMS/[facility])   • Allergic rhinitis       Review of Systems   Constitutional: Negative for activity change, appetite change, fatigue and unexpected weight change.   HENT: Positive for congestion and ear pain. Negative for sinus pressure and sinus  pain.    Eyes: Negative for pain, discharge and visual disturbance.   Respiratory: Positive for cough. Negative for chest tightness and shortness of breath.    Cardiovascular: Negative for chest pain and palpitations.   Gastrointestinal: Negative for abdominal pain.   Skin: Negative for color change.   Neurological: Negative for dizziness, syncope, speech difficulty and headaches.   Psychiatric/Behavioral: Negative for confusion and decreased concentration.   All other systems reviewed and are negative.      Objective   Physical Exam   Constitutional: He is oriented to person, place, and time. He appears well-developed and well-nourished. No distress.   HENT:   Head: Normocephalic and atraumatic.   Right Ear: External ear normal.   Left Ear: External ear normal.   Mouth/Throat: Oropharynx is clear and moist. No oropharyngeal exudate.   Tender and congested in sinuses   Eyes: EOM are normal.   Neck: Normal range of motion. No thyromegaly present.   Cardiovascular: Normal rate and regular rhythm.   Pulmonary/Chest: Effort normal and breath sounds normal.   Abdominal: Soft. Bowel sounds are normal.   Musculoskeletal: Normal range of motion.   Neurological: He is alert and oriented to person, place, and time.   Skin: Skin is warm. No rash noted. He is not diaphoretic.   Psychiatric: He has a normal mood and affect.   Nursing note and vitals reviewed.   tender and congested maxillary sinus  Assessment/Plan   Tony was seen today for sinusitis, hypothyroidism and hyperlipidemia.    Diagnoses and all orders for this visit:    Acute maxillary sinusitis, recurrence not specified    Allergic rhinitis, unspecified seasonality, unspecified trigger  -     montelukast (SINGULAIR) 10 MG tablet; Take 1 tablet by mouth Every Evening.    Hypothyroidism, unspecified type  -     levothyroxine (SYNTHROID, LEVOTHROID) 200 MCG tablet; Take 1 tablet by mouth Daily.    Paroxysmal atrial fibrillation (CMS/HCC)    Class 2 severe obesity due  to excess calories with serious comorbidity and body mass index (BMI) of 38.0 to 38.9 in adult (CMS/[facility])    Sleep apnea, unspecified type    Chronic anticoagulation    Other orders  -     albuterol sulfate  (90 Base) MCG/ACT inhaler; Inhale 2 puffs Every 4 (Four) Hours As Needed for Wheezing.  -     amoxicillin-clavulanate (Augmentin) 875-125 MG per tablet; Take 1 tablet by mouth 2 (Two) Times a Day.    Labs results discussed in detail with the patient, if no recent labs were done, order placed today.  Plan to update vaccines if needed today.  I  reviewed health maintenance with the patient as part of my preventative care plan. I discussed preventative counseling with the patient in detail.

## 2020-08-30 PROBLEM — J30.9 ALLERGIC RHINITIS: Status: ACTIVE | Noted: 2020-08-30

## 2020-09-08 ENCOUNTER — TELEPHONE (OUTPATIENT)
Dept: FAMILY MEDICINE CLINIC | Facility: CLINIC | Age: 70
End: 2020-09-08

## 2020-09-09 NOTE — TELEPHONE ENCOUNTER
Side effect of aaugmentin     Stop augmentin    Start bactrim ds   1 po q 12 hours  #14   Sent to his pharmacy.

## 2020-11-16 ENCOUNTER — OFFICE VISIT (OUTPATIENT)
Dept: FAMILY MEDICINE CLINIC | Facility: CLINIC | Age: 70
End: 2020-11-16

## 2020-11-16 VITALS
SYSTOLIC BLOOD PRESSURE: 146 MMHG | HEART RATE: 78 BPM | BODY MASS INDEX: 39.01 KG/M2 | RESPIRATION RATE: 16 BRPM | DIASTOLIC BLOOD PRESSURE: 80 MMHG | HEIGHT: 74 IN | TEMPERATURE: 97.1 F | OXYGEN SATURATION: 98 % | WEIGHT: 304 LBS

## 2020-11-16 DIAGNOSIS — R93.89 ABNORMAL CT OF THE CHEST: ICD-10-CM

## 2020-11-16 DIAGNOSIS — I48.0 PAROXYSMAL ATRIAL FIBRILLATION (HCC): Primary | ICD-10-CM

## 2020-11-16 PROCEDURE — 99214 OFFICE O/P EST MOD 30 MIN: CPT | Performed by: FAMILY MEDICINE

## 2020-11-16 RX ORDER — RANOLAZINE 500 MG/1
500 TABLET, EXTENDED RELEASE ORAL 2 TIMES DAILY
COMMUNITY
Start: 2020-11-07 | End: 2022-09-12

## 2020-11-16 RX ORDER — BIMATOPROST 0.01 %
1 DROPS OPHTHALMIC (EYE)
COMMUNITY
Start: 2020-10-28 | End: 2022-01-20

## 2020-11-16 NOTE — PROGRESS NOTES
"Subjective   Chief Complaint:   Chief Complaint   Patient presents with   • Review Test Results            History of Present Illness in today to discuss some records from [facility] that he did about a month a month and a half ago and they told him to follow-up with Dr. Altamirano because there was some abnormalities on a chest x-ray rather a CT scan of his chest I do not see that in the chart so I will call the radiology department at [facility] and see if I can find this.  He does still smoke cigarettes about a pack a day and he said he has decreased energy so I am going to go through the chart it looks like he had a work-up and had some labs etc. I want to see if we need anything else and then I need to find the chest x-ray or CT scan of the chest that he is talking about that showed an abnormality and then he said he is weak so need to get all the labs.   Also he said he is having sweats also patient said he still smoking a pack to pack and half per day he sees Dr. Ponce cardiologist doing good as far as atrial fib he sees Dr. Hernnadez is on Eliquis as a blood thinner.  To recheck him back in 2 weeks but I want him to call me in 2 weeks to see if I found this CAT scan of his chest. I will get the labs from [facility] so I need to do all the labs.      CT PET from [place] 10/20/2020  \"IMPRESSION:  1. There is evidence of atherosclerotic change along coronary arteries with evidence of previous coronary artery stenting.  2. Small calcifications seen in the region of the aortic and mitral valves.  3. Mild cardiomegaly.  4. Stable interstitial prominence in the posterior lung bases.  5. Tiny subpleural nodules in the lateral right middle lobe one of which was included in the field-of-view from her previous abdomen CT and appears stable. These are most likely benign, although if the patient is considered to be in a high risk category, would consider baseline chest CT for follow-up evaluation.\"      I spoke with Radiology at " [facility]. I am going to order a CT Chest W Con for evaluation as suggested.        Past Medical History:   Diagnosis Date   • Acute sinus infection     RECENT ANTIBIOTIC   • Atrial fibrillation (CMS/HCC)    • Benign essential hypertension    • Bladder tumor    • Depression    • JAIDA (generalized anxiety disorder)    • GERD (gastroesophageal reflux disease)    • Heart attack (CMS/HCC)    • HLD (hyperlipidemia)    • Hypothyroidism, acquired    • Screening for glaucoma 2007    negative   • Sleep apnea     C-PAP        Tony Max 70 y.o. male who presents today discuss test results      ICD-10-CM ICD-9-CM   1. Paroxysmal atrial fibrillation (CMS/HCC)  I48.0 427.31   2. Abnormal CT of the chest  R93.89 793.2        he has a problem list of   Patient Active Problem List   Diagnosis   • JAIDA (generalized anxiety disorder)   • Atrial fibrillation (CMS/HCC)   • Depression   • GERD (gastroesophageal reflux disease)   • Heart attack (CMS/HCC)   • HLD (hyperlipidemia)   • Benign essential hypertension   • Hypothyroidism, acquired   • Sleep apnea   • Hypothyroidism   • History of BPH   • Elevated blood sugar   • Seasonal allergies   • Acute maxillary sinusitis   • UTI (urinary tract infection)   • Class 2 severe obesity due to excess calories with serious comorbidity and body mass index (BMI) of 38.0 to 38.9 in adult (CMS/HCC)   • Atypical chest pain   • Chronic anticoagulation   • Non-ST elevated myocardial infarction (non-STEMI) (CMS/[facility])   • Allergic rhinitis   .    he has been compliant with   Current Outpatient Medications:   •  albuterol sulfate  (90 Base) MCG/ACT inhaler, Inhale 2 puffs Every 4 (Four) Hours As Needed for Wheezing., Disp: 18 g, Rfl: 2  •  apixaban (ELIQUIS) 5 MG tablet tablet, Take 1 tablet by mouth 2 (Two) Times a Day., Disp: 60 tablet, Rfl:   •  aspirin 81 MG chewable tablet, Chew 1 tablet Daily., Disp: , Rfl:   •  finasteride (PROSCAR) 5 MG tablet, Take 5 mg by mouth Daily., Disp: , Rfl:  "  •  fluticasone (FLONASE) 50 MCG/ACT nasal spray, 2 sprays into the nostril(s) as directed by provider Daily., Disp: 1 bottle, Rfl: 0  •  levothyroxine (SYNTHROID, LEVOTHROID) 200 MCG tablet, Take 1 tablet by mouth Daily., Disp: 90 tablet, Rfl: 1  •  LORazepam (ATIVAN) 0.5 MG tablet, Take 1 tablet by mouth Every 8 (Eight) Hours As Needed for Anxiety., Disp: 30 tablet, Rfl: 2  •  metoprolol succinate XL (TOPROL-XL) 25 MG 24 hr tablet, Take 25 mg by mouth Daily., Disp: , Rfl:   •  montelukast (SINGULAIR) 10 MG tablet, Take 1 tablet by mouth Every Evening., Disp: 90 tablet, Rfl: 1  •  ranolazine (RANEXA) 500 MG 12 hr tablet, Take 500 mg by mouth 2 (Two) Times a Day., Disp: , Rfl:   •  simvastatin (ZOCOR) 20 MG tablet, Take 20 mg by mouth Every Night., Disp: , Rfl:   •  Lumigan 0.01 % ophthalmic drops, Administer 1 drop to both eyes every night at bedtime., Disp: , Rfl:   •  nitroglycerin (NITROSTAT) 0.4 MG SL tablet, Place 0.4 mg under the tongue Every 5 (Five) Minutes As Needed for chest pain. Take no more than 3 doses in 15 minutes., Disp: , Rfl: .  he denies medication side effects.        /80   Pulse 78   Temp 97.1 °F (36.2 °C)   Resp 16   Ht 188 cm (74\")   Wt (!) 138 kg (304 lb)   SpO2 98%   BMI 39.03 kg/m²     Results for orders placed or performed during the hospital encounter of 10/05/20   COVID-19,[facility] ROUTINE, NP/OP SWAB IN TRANSPORT MEDIA OR ESWAB 72 HR TAT - Swab, Nasopharynx    Specimen: Nasopharynx; Swab   Result Value Ref Range    SARS-CoV-2, RENU Not Detected Not Detected       The following portions of the patient's history were reviewed and updated as appropriate: allergies, current medications, past family history, past medical history, past social history, past surgical history and problem list.      he has a history of   Patient Active Problem List   Diagnosis   • JAIDA (generalized anxiety disorder)   • Atrial fibrillation (CMS/HCC)   • Depression   • GERD (gastroesophageal reflux " disease)   • Heart attack (CMS/HCC)   • HLD (hyperlipidemia)   • Benign essential hypertension   • Hypothyroidism, acquired   • Sleep apnea   • Hypothyroidism   • History of BPH   • Elevated blood sugar   • Seasonal allergies   • Acute maxillary sinusitis   • UTI (urinary tract infection)   • Class 2 severe obesity due to excess calories with serious comorbidity and body mass index (BMI) of 38.0 to 38.9 in adult (CMS/HCC)   • Atypical chest pain   • Chronic anticoagulation   • Non-ST elevated myocardial infarction (non-STEMI) (CMS/[facility])   • Allergic rhinitis       Review of Systems   Constitutional: Negative for diaphoresis and fatigue.   Respiratory: Negative for chest tightness and shortness of breath.    Cardiovascular: Negative for chest pain.   Gastrointestinal: Negative for abdominal pain.   Genitourinary: Negative for dysuria.   Neurological: Negative for headaches.   Psychiatric/Behavioral: Negative for confusion.       Objective   Physical Exam  Constitutional:       Appearance: Normal appearance.   HENT:      Nose: Nose normal. No congestion.      Mouth/Throat:      Pharynx: No oropharyngeal exudate.   Eyes:      Pupils: Pupils are equal, round, and reactive to light.   Neck:      Musculoskeletal: Normal range of motion.   Cardiovascular:      Rate and Rhythm: Rhythm irregular.   Pulmonary:      Effort: Pulmonary effort is normal.      Breath sounds: Normal breath sounds. No rales.   Musculoskeletal:         General: No tenderness.      Right lower leg: No edema.      Left lower leg: No edema.   Neurological:      General: No focal deficit present.      Mental Status: He is alert.   Psychiatric:         Mood and Affect: Mood normal.         Assessment/Plan   Diagnoses and all orders for this visit:    1. Paroxysmal atrial fibrillation (CMS/HCC) (Primary)    2. Abnormal CT of the chest  -     CT Chest With Contrast      Labs results discussed in detail with the patient, if no recent labs were done, order  placed today.  Plan to update vaccines if needed today.  I  reviewed health maintenance with the patient as part of my preventative care plan. I discussed preventative counseling with the patient in detail.

## 2020-12-01 ENCOUNTER — HOSPITAL ENCOUNTER (OUTPATIENT)
Dept: CT IMAGING | Facility: HOSPITAL | Age: 70
Discharge: HOME | End: 2020-12-01
Admitting: FAMILY MEDICINE

## 2020-12-01 PROCEDURE — 25010000002 IOPAMIDOL 61 % SOLUTION: Performed by: FAMILY MEDICINE

## 2020-12-01 PROCEDURE — 71260 CT THORAX DX C+: CPT

## 2020-12-01 PROCEDURE — 82565 ASSAY OF CREATININE: CPT

## 2020-12-01 RX ADMIN — IOPAMIDOL 75 ML: 612 INJECTION, SOLUTION INTRAVENOUS at 10:08

## 2020-12-02 LAB — CREAT BLDA-MCNC: 1.4 MG/DL (ref 0.6–1.3)

## 2020-12-04 ENCOUNTER — TELEPHONE (OUTPATIENT)
Dept: FAMILY MEDICINE CLINIC | Facility: CLINIC | Age: 70
End: 2020-12-04

## 2021-01-22 ENCOUNTER — OFFICE VISIT (OUTPATIENT)
Dept: FAMILY MEDICINE CLINIC | Facility: CLINIC | Age: 71
End: 2021-01-22

## 2021-01-22 DIAGNOSIS — J01.90 ACUTE SINUSITIS, RECURRENCE NOT SPECIFIED, UNSPECIFIED LOCATION: Primary | ICD-10-CM

## 2021-01-22 PROCEDURE — 99213 OFFICE O/P EST LOW 20 MIN: CPT | Performed by: NURSE PRACTITIONER

## 2021-01-22 RX ORDER — AZITHROMYCIN 250 MG/1
TABLET, FILM COATED ORAL
Qty: 6 TABLET | Refills: 0 | Status: SHIPPED | OUTPATIENT
Start: 2021-01-22 | End: 2021-03-01

## 2021-01-22 RX ORDER — PREDNISONE 20 MG/1
20 TABLET ORAL DAILY
Qty: 5 TABLET | Refills: 0 | Status: SHIPPED | OUTPATIENT
Start: 2021-01-22 | End: 2021-03-01

## 2021-01-22 RX ORDER — BUSPIRONE HYDROCHLORIDE 10 MG/1
TABLET ORAL
COMMUNITY
Start: 2021-01-18 | End: 2022-03-07

## 2021-01-22 NOTE — PROGRESS NOTES
Subjective   Tony Max is a 70 y.o. male.   You have chosen to receive care through a telehealth visit.  Do you consent to use a video/audio connection for your medical care today? Yes  History of Present Illness   Tony Max 70 y.o. male who presents for evaluation of sinus pressure and congestion. Symptoms include congestion, nasal blockage, headache and sinus pain.  Onset of symptoms was 2 weeks ago, unchanged since that time. Patient denies shortness of breath, wheezing, fever.   Evaluation to date: none Treatment to date:  doxycycline from  which has not helped. COVID test negative. Doxycycline has caused nausea and diarrhea so he stopped taking.       The following portions of the patient's history were reviewed and updated as appropriate: allergies, current medications, past family history, past medical history, past social history, past surgical history and problem list.    Review of Systems   Constitutional: Negative for chills, fatigue and fever.   Respiratory: Negative for cough and shortness of breath.    Cardiovascular: Negative for chest pain and palpitations.   Skin: Negative for rash.   Psychiatric/Behavioral: Negative for dysphoric mood and sleep disturbance. The patient is not nervous/anxious.        Objective   Physical Exam  Vitals signs and nursing note reviewed.   Constitutional:       Appearance: He is well-developed.   Pulmonary:      Effort: Pulmonary effort is normal.   Neurological:      Mental Status: He is oriented to person, place, and time.   Psychiatric:         Mood and Affect: Mood normal.         Behavior: Behavior normal.         Thought Content: Thought content normal.         Judgment: Judgment normal.         Assessment/Plan   Diagnoses and all orders for this visit:    1. Acute sinusitis, recurrence not specified, unspecified location (Primary)  -     azithromycin (Zithromax Z-Michael) 250 MG tablet; Take 2 tablets by mouth on day 1, then 1 tablet daily on days 2-5   Dispense: 6 tablet; Refill: 0  -     predniSONE (DELTASONE) 20 MG tablet; Take 1 tablet by mouth Daily.  Dispense: 5 tablet; Refill: 0              This visit has been rescheduled as a phone/video visit to comply with patient safety concerns in accordance with CDC recommendations. Total time of discussion was 11 minutes.

## 2021-03-01 ENCOUNTER — OFFICE VISIT (OUTPATIENT)
Dept: FAMILY MEDICINE CLINIC | Facility: CLINIC | Age: 71
End: 2021-03-01

## 2021-03-01 VITALS
TEMPERATURE: 97.5 F | OXYGEN SATURATION: 98 % | HEART RATE: 85 BPM | DIASTOLIC BLOOD PRESSURE: 83 MMHG | RESPIRATION RATE: 16 BRPM | BODY MASS INDEX: 37.86 KG/M2 | HEIGHT: 74 IN | SYSTOLIC BLOOD PRESSURE: 141 MMHG | WEIGHT: 295 LBS

## 2021-03-01 DIAGNOSIS — E03.9 HYPOTHYROIDISM, UNSPECIFIED TYPE: ICD-10-CM

## 2021-03-01 DIAGNOSIS — J30.9 ALLERGIC RHINITIS, UNSPECIFIED SEASONALITY, UNSPECIFIED TRIGGER: ICD-10-CM

## 2021-03-01 PROCEDURE — 99213 OFFICE O/P EST LOW 20 MIN: CPT | Performed by: FAMILY MEDICINE

## 2021-03-01 RX ORDER — LEVOTHYROXINE SODIUM 0.2 MG/1
200 TABLET ORAL DAILY
Qty: 90 TABLET | Refills: 1 | Status: SHIPPED | OUTPATIENT
Start: 2021-03-01 | End: 2021-08-13 | Stop reason: SDUPTHER

## 2021-03-01 RX ORDER — MONTELUKAST SODIUM 10 MG/1
10 TABLET ORAL EVERY EVENING
Qty: 90 TABLET | Refills: 1 | Status: SHIPPED | OUTPATIENT
Start: 2021-03-01 | End: 2021-08-13 | Stop reason: SDUPTHER

## 2021-03-01 NOTE — PROGRESS NOTES
Subjective   Tony Max is a 70 y.o. male.     History of Present Illness Patient here to refill  Synthroid 200 mcg  And singulair 10 mg. ok on both meds  Ask me questions about the dose of both and again the Singulair is 10 mg and the Synthroid is 200 MCG's is doing well on both.  Refill meds and I will schedule labs for him to do and I will call him the report he says he is feeling tired from the medications we went through all of them a lot of them are from the heart doctors so not can to stop any of those so I am going to fill the 2 that I give him which is Synthroid 200 MCG's and singular 10 mg he is on the lorazepam 1 mg 3 times daily which I told him he could make him tired he is on BuSpar 10 mg 1 twice daily which probably will not make him tired he is also on metoprolol soon this is an 8 ER 25 mg twice daily and that could make him tired because it is a beta-blocker is on Eliquis 5 twice daily he is on aspirin 81 mg he is on simvastatin 20 mg and was around a loxapine 100 mg twice daily.  Finasteride 5 mg so he is going to watch these medicines but unlikely to change any I am to get some labs on him the labs today.  Stephania he has no energy I thought he would make an appointment with the heart doctor and see him.  Order labs and call him the results.  Order a CMP profile the rest of the labs.  I will call him the results and then I went ahead I refilled by 2 medications and 1 to discuss this after I call him to make sure he meds to go and see the heart doctor which she is already got an appointment scheduled.    The following portions of the patient's history were reviewed and updated as appropriate: allergies, current medications, past family history, past medical history, past social history, past surgical history and problem list.    Review of Systems   Constitutional: Negative for diaphoresis and fever.   Respiratory: Negative for cough, chest tightness and shortness of breath.    Cardiovascular:  Negative for chest pain and palpitations.   Gastrointestinal: Negative for abdominal pain.   Neurological: Negative for headache.   All other systems reviewed and are negative.      Objective   Physical Exam  Vitals signs and nursing note reviewed.   Constitutional:       General: He is not in acute distress.     Appearance: He is well-developed. He is not ill-appearing, toxic-appearing or diaphoretic.   HENT:      Head: Normocephalic and atraumatic.      Right Ear: External ear normal.      Left Ear: External ear normal.      Nose: Nose normal.   Eyes:      Pupils: Pupils are equal, round, and reactive to light.   Neck:      Musculoskeletal: Normal range of motion and neck supple.      Thyroid: No thyromegaly.   Cardiovascular:      Rate and Rhythm: Normal rate and regular rhythm.   Pulmonary:      Effort: Pulmonary effort is normal. No respiratory distress.      Breath sounds: Normal breath sounds.   Abdominal:      Palpations: Abdomen is soft.   Musculoskeletal: Normal range of motion.         General: No signs of injury.   Skin:     General: Skin is warm and dry.      Findings: No erythema.   Neurological:      Mental Status: He is alert and oriented to person, place, and time.   Psychiatric:         Mood and Affect: Mood normal.         Behavior: Behavior normal.         Thought Content: Thought content normal.         Judgment: Judgment normal.           Assessment/Plan   Diagnoses and all orders for this visit:    1. Hypothyroidism, unspecified type  -     levothyroxine (SYNTHROID, LEVOTHROID) 200 MCG tablet; Take 1 tablet by mouth Daily.  Dispense: 90 tablet; Refill: 1    2. Allergic rhinitis, unspecified seasonality, unspecified trigger  -     montelukast (SINGULAIR) 10 MG tablet; Take 1 tablet by mouth Every Evening.  Dispense: 90 tablet; Refill: 1      Labs results discussed in detail with the patient, if no recent labs were done, order placed today.  Plan to update vaccines if needed today.  I  reviewed  health maintenance with the patient as part of my preventative care plan. I discussed preventative counseling with the patient in detail.

## 2021-07-23 ENCOUNTER — TELEPHONE (OUTPATIENT)
Dept: FAMILY MEDICINE CLINIC | Facility: CLINIC | Age: 71
End: 2021-07-23

## 2021-07-23 DIAGNOSIS — F41.1 GAD (GENERALIZED ANXIETY DISORDER): ICD-10-CM

## 2021-07-23 DIAGNOSIS — E78.2 MIXED HYPERLIPIDEMIA: ICD-10-CM

## 2021-07-23 DIAGNOSIS — E66.01 CLASS 2 SEVERE OBESITY DUE TO EXCESS CALORIES WITH SERIOUS COMORBIDITY AND BODY MASS INDEX (BMI) OF 38.0 TO 38.9 IN ADULT (HCC): ICD-10-CM

## 2021-07-23 DIAGNOSIS — I10 BENIGN ESSENTIAL HYPERTENSION: ICD-10-CM

## 2021-07-23 DIAGNOSIS — E03.9 HYPOTHYROIDISM, UNSPECIFIED TYPE: Primary | ICD-10-CM

## 2021-07-23 DIAGNOSIS — R73.9 ELEVATED BLOOD SUGAR: ICD-10-CM

## 2021-07-23 NOTE — TELEPHONE ENCOUNTER
Caller: Tony Max    Relationship: Self    Best call back number: [phone]     What orders are you requesting (i.e. lab or imaging): LABS    In what timeframe would the patient need to come in: ASAP    Where will you receive your lab/imaging services: [facility]     Additional notes: PLEASE ADVISE

## 2021-08-05 LAB
ALBUMIN SERPL-MCNC: 4 G/DL (ref 3.5–5.2)
ALBUMIN/GLOB SERPL: 1.8 G/DL
ALP SERPL-CCNC: 101 U/L (ref 39–117)
ALT SERPL-CCNC: 22 U/L (ref 1–41)
AST SERPL-CCNC: 20 U/L (ref 1–40)
BASOPHILS # BLD AUTO: 0.01 10*3/MM3 (ref 0–0.2)
BASOPHILS NFR BLD AUTO: 0.1 % (ref 0–1.5)
BILIRUB SERPL-MCNC: 2.2 MG/DL (ref 0–1.2)
BUN SERPL-MCNC: 20 MG/DL (ref 8–23)
BUN/CREAT SERPL: 18 (ref 7–25)
CALCIUM SERPL-MCNC: 8.6 MG/DL (ref 8.6–10.5)
CHLORIDE SERPL-SCNC: 112 MMOL/L (ref 98–107)
CHOLEST SERPL-MCNC: 92 MG/DL (ref 0–200)
CO2 SERPL-SCNC: 21.9 MMOL/L (ref 22–29)
CREAT SERPL-MCNC: 1.11 MG/DL (ref 0.76–1.27)
EOSINOPHIL # BLD AUTO: 0.03 10*3/MM3 (ref 0–0.4)
EOSINOPHIL NFR BLD AUTO: 0.4 % (ref 0.3–6.2)
ERYTHROCYTE [DISTWIDTH] IN BLOOD BY AUTOMATED COUNT: 12.8 % (ref 12.3–15.4)
GLOBULIN SER CALC-MCNC: 2.2 GM/DL
GLUCOSE SERPL-MCNC: 106 MG/DL (ref 65–99)
HBA1C MFR BLD: 5.2 % (ref 4.8–5.6)
HCT VFR BLD AUTO: 43.9 % (ref 37.5–51)
HDLC SERPL-MCNC: 37 MG/DL (ref 40–60)
HGB BLD-MCNC: 15.3 G/DL (ref 13–17.7)
IMM GRANULOCYTES # BLD AUTO: 0.01 10*3/MM3 (ref 0–0.05)
IMM GRANULOCYTES NFR BLD AUTO: 0.1 % (ref 0–0.5)
LDLC SERPL CALC-MCNC: 40 MG/DL (ref 0–100)
LYMPHOCYTES # BLD AUTO: 3.06 10*3/MM3 (ref 0.7–3.1)
LYMPHOCYTES NFR BLD AUTO: 43 % (ref 19.6–45.3)
MCH RBC QN AUTO: 34.5 PG (ref 26.6–33)
MCHC RBC AUTO-ENTMCNC: 34.9 G/DL (ref 31.5–35.7)
MCV RBC AUTO: 99.1 FL (ref 79–97)
MONOCYTES # BLD AUTO: 0.6 10*3/MM3 (ref 0.1–0.9)
MONOCYTES NFR BLD AUTO: 8.4 % (ref 5–12)
NEUTROPHILS # BLD AUTO: 3.4 10*3/MM3 (ref 1.7–7)
NEUTROPHILS NFR BLD AUTO: 48 % (ref 42.7–76)
NRBC BLD AUTO-RTO: 0 /100 WBC (ref 0–0.2)
PLATELET # BLD AUTO: 131 10*3/MM3 (ref 140–450)
POTASSIUM SERPL-SCNC: 3.8 MMOL/L (ref 3.5–5.2)
PROT SERPL-MCNC: 6.2 G/DL (ref 6–8.5)
RBC # BLD AUTO: 4.43 10*6/MM3 (ref 4.14–5.8)
SODIUM SERPL-SCNC: 142 MMOL/L (ref 136–145)
T4 FREE SERPL-MCNC: 1.6 NG/DL (ref 0.93–1.7)
TRIGL SERPL-MCNC: 72 MG/DL (ref 0–150)
TSH SERPL DL<=0.005 MIU/L-ACNC: 0.29 UIU/ML (ref 0.27–4.2)
VLDLC SERPL CALC-MCNC: 15 MG/DL (ref 5–40)
WBC # BLD AUTO: 7.11 10*3/MM3 (ref 3.4–10.8)

## 2021-08-13 ENCOUNTER — OFFICE VISIT (OUTPATIENT)
Dept: FAMILY MEDICINE CLINIC | Facility: CLINIC | Age: 71
End: 2021-08-13

## 2021-08-13 VITALS
RESPIRATION RATE: 16 BRPM | HEART RATE: 84 BPM | TEMPERATURE: 97.1 F | OXYGEN SATURATION: 99 % | BODY MASS INDEX: 35.68 KG/M2 | HEIGHT: 74 IN | WEIGHT: 278 LBS

## 2021-08-13 DIAGNOSIS — J30.9 ALLERGIC RHINITIS, UNSPECIFIED SEASONALITY, UNSPECIFIED TRIGGER: ICD-10-CM

## 2021-08-13 DIAGNOSIS — R71.8 ELEVATED MCV: Primary | ICD-10-CM

## 2021-08-13 DIAGNOSIS — G47.33 OBSTRUCTIVE SLEEP APNEA: ICD-10-CM

## 2021-08-13 DIAGNOSIS — R79.89 ABNORMAL CBC: ICD-10-CM

## 2021-08-13 DIAGNOSIS — E03.9 HYPOTHYROIDISM, UNSPECIFIED TYPE: ICD-10-CM

## 2021-08-13 PROCEDURE — 99213 OFFICE O/P EST LOW 20 MIN: CPT | Performed by: FAMILY MEDICINE

## 2021-08-13 RX ORDER — MONTELUKAST SODIUM 10 MG/1
10 TABLET ORAL EVERY EVENING
Qty: 90 TABLET | Refills: 1 | Status: SHIPPED | OUTPATIENT
Start: 2021-08-13 | End: 2022-03-07 | Stop reason: SDUPTHER

## 2021-08-13 RX ORDER — LEVOTHYROXINE SODIUM 0.2 MG/1
200 TABLET ORAL DAILY
Qty: 90 TABLET | Refills: 1 | Status: SHIPPED | OUTPATIENT
Start: 2021-08-13 | End: 2022-03-07 | Stop reason: SDUPTHER

## 2021-08-13 NOTE — PROGRESS NOTES
Subjective   Tony Max is a 70 y.o. male.     History of Present Illness     70-year-old male who is new patient to me presents today for review of lab results done 8 days ago.  A1c TSH T4 lipid panel essentially normal.  CMP shows mild elevation of chloride and total bilirubin which can be transient.  CBC shows MCV of 99.1.  Platelets low at 131.  Prior CBC done over a year ago showed normal platelets.  MCV was still elevated at that time.    Amenable to checking B12 today as he reports unexplained fatigue in the setting of elevated MCV.  Amenable to rechecking CBC in 2 weeks to follow-up on platelets.    MIMI: Has a Pap machine; has a recalled Herminia device; however machine is also broken beyond repair not working as well anymore.  Advised to follow-up with me in the sleep lab.  Call his [employer] about getting Pap bacterial filter and advised to not use humidifier until he gets a new machine.      The following portions of the patient's history were reviewed and updated as appropriate: allergies, current medications, past family history, past medical history, past social history, past surgical history and problem list.    Review of Systems   Constitutional: Positive for fatigue. Negative for chills and fever.   Respiratory: Negative for cough and shortness of breath.    Cardiovascular: Negative for chest pain, palpitations and leg swelling.   Gastrointestinal: Negative for abdominal pain, nausea and vomiting.       Objective   Physical Exam  Constitutional:       Appearance: He is well-developed.   HENT:      Head: Normocephalic and atraumatic.   Eyes:      Pupils: Pupils are equal, round, and reactive to light.   Cardiovascular:      Rate and Rhythm: Normal rate and regular rhythm.      Heart sounds: Normal heart sounds. No murmur heard.     Pulmonary:      Effort: Pulmonary effort is normal. No respiratory distress.      Breath sounds: Normal breath sounds. No stridor. No wheezing or rales.    Musculoskeletal:      Cervical back: Normal range of motion and neck supple.   Skin:     General: Skin is warm.   Neurological:      Mental Status: He is alert and oriented to person, place, and time.   Psychiatric:         Behavior: Behavior normal.                 Assessment/Plan     Diagnoses and all orders for this visit:    1. Elevated MCV (Primary)  -     Vitamin B12 & Folate    2. Hypothyroidism, unspecified type  -     levothyroxine (SYNTHROID, LEVOTHROID) 200 MCG tablet; Take 1 tablet by mouth Daily.  Dispense: 90 tablet; Refill: 1    3. Allergic rhinitis, unspecified seasonality, unspecified trigger  -     montelukast (SINGULAIR) 10 MG tablet; Take 1 tablet by mouth Every Evening.  Dispense: 90 tablet; Refill: 1    4. Abnormal CBC  -     CBC & Differential; Future    5. Obstructive sleep apnea    Check B12 today recheck CBC in 2 weeks to follow-up on platelets.  Refills as noted above.  Follow-up with me in the sleep lab for [facility].

## 2021-08-14 LAB
FOLATE SERPL-MCNC: 2.47 NG/ML (ref 4.78–24.2)
VIT B12 SERPL-MCNC: 252 PG/ML (ref 211–946)

## 2021-08-18 NOTE — PROGRESS NOTES
B12 low normal and folate level is low.  Set up telephone visit to discuss results and supplementation.

## 2021-09-09 DIAGNOSIS — E03.9 HYPOTHYROIDISM, UNSPECIFIED TYPE: ICD-10-CM

## 2021-09-09 RX ORDER — LEVOTHYROXINE SODIUM 0.2 MG/1
TABLET ORAL
Qty: 90 TABLET | Refills: 1 | OUTPATIENT
Start: 2021-09-09

## 2021-09-27 DIAGNOSIS — J30.9 ALLERGIC RHINITIS, UNSPECIFIED SEASONALITY, UNSPECIFIED TRIGGER: ICD-10-CM

## 2021-09-27 RX ORDER — MONTELUKAST SODIUM 10 MG/1
TABLET ORAL
Qty: 90 TABLET | Refills: 1 | OUTPATIENT
Start: 2021-09-27

## 2021-11-08 ENCOUNTER — OFFICE VISIT (OUTPATIENT)
Dept: FAMILY MEDICINE CLINIC | Facility: CLINIC | Age: 71
End: 2021-11-08

## 2021-11-08 VITALS
SYSTOLIC BLOOD PRESSURE: 138 MMHG | BODY MASS INDEX: 36.96 KG/M2 | OXYGEN SATURATION: 96 % | TEMPERATURE: 97.3 F | HEART RATE: 91 BPM | DIASTOLIC BLOOD PRESSURE: 88 MMHG | RESPIRATION RATE: 16 BRPM | HEIGHT: 74 IN | WEIGHT: 288 LBS

## 2021-11-08 DIAGNOSIS — M54.2 NECK PAIN WITHOUT INJURY: ICD-10-CM

## 2021-11-08 DIAGNOSIS — M25.512 ACUTE PAIN OF LEFT SHOULDER: ICD-10-CM

## 2021-11-08 DIAGNOSIS — M62.838 MUSCLE SPASMS OF NECK: Primary | ICD-10-CM

## 2021-11-08 PROCEDURE — 99214 OFFICE O/P EST MOD 30 MIN: CPT

## 2021-11-08 PROCEDURE — 73030 X-RAY EXAM OF SHOULDER: CPT

## 2021-11-08 RX ORDER — CYCLOBENZAPRINE HCL 5 MG
5 TABLET ORAL 3 TIMES DAILY PRN
Qty: 30 TABLET | Refills: 0 | Status: SHIPPED | OUTPATIENT
Start: 2021-11-08 | End: 2021-11-09

## 2021-11-08 NOTE — PROGRESS NOTES
Chief Complaint  Pain in left side of neck and shoulder    Subjective          Tony Max presents to [facility]  History of Present Illness    Tony Max 71 y.o. male who presents today for pain in the left side of his neck that radiates to the left shoulder. The pain does not radiate down his left arm. There was no known injury or fall. Onset of symptoms was 5 weeks ago. The pain is described as constant and dull. The pain is reduced by applying pressure, heating pad, and Tylenol. He has been going to the chiropractor for 5 weeks and it has not helped. The pain is aggravated by cleaning, bending over, and laying on his right side. No similar episodes. No chest pain, shortness of breath, dizziness, weakness, or syncope.         He has a problem list of the following:   Patient Active Problem List   Diagnosis   • JAIDA (generalized anxiety disorder)   • Atrial fibrillation ([facility])   • Depression   • GERD (gastroesophageal reflux disease)   • Heart attack ([facility])   • HLD (hyperlipidemia)   • Benign essential hypertension   • Hypothyroidism, acquired   • Sleep apnea   • Hypothyroidism   • History of BPH   • Elevated blood sugar   • Seasonal allergies   • Acute maxillary sinusitis   • UTI (urinary tract infection)   • Class 2 severe obesity due to excess calories with serious comorbidity and body mass index (BMI) of 38.0 to 38.9 in adult ([facility])   • Atypical chest pain   • Chronic anticoagulation   • Non-ST elevated myocardial infarction (non-STEMI) ([facility])   • Allergic rhinitis           He has been compliant with the following medications:   Current Outpatient Medications:   •  apixaban (ELIQUIS) 5 MG tablet tablet, Take 1 tablet by mouth 2 (Two) Times a Day., Disp: 60 tablet, Rfl:   •  aspirin 81 MG chewable tablet, Chew 1 tablet Daily., Disp: , Rfl:   •  busPIRone (BUSPAR) 10 MG tablet, , Disp: , Rfl:   •  finasteride (PROSCAR) 5 MG tablet, Take 5 mg by mouth Daily., Disp: , Rfl:   •   "levothyroxine (SYNTHROID, LEVOTHROID) 200 MCG tablet, Take 1 tablet by mouth Daily., Disp: 90 tablet, Rfl: 1  •  LORazepam (ATIVAN) 1 MG tablet, , Disp: , Rfl:   •  Lumigan 0.01 % ophthalmic drops, Administer 1 drop to both eyes every night at bedtime., Disp: , Rfl:   •  metoprolol succinate XL (TOPROL-XL) 25 MG 24 hr tablet, Take 25 mg by mouth Daily., Disp: , Rfl:   •  montelukast (SINGULAIR) 10 MG tablet, Take 1 tablet by mouth Every Evening., Disp: 90 tablet, Rfl: 1  •  simvastatin (ZOCOR) 20 MG tablet, Take 20 mg by mouth Every Night., Disp: , Rfl:   •  albuterol sulfate  (90 Base) MCG/ACT inhaler, Inhale 2 puffs Every 4 (Four) Hours As Needed for Wheezing., Disp: 18 g, Rfl: 2  •  cyclobenzaprine (FLEXERIL) 5 MG tablet, Take 1 tablet by mouth 3 (Three) Times a Day As Needed for Muscle Spasms for up to 10 days. Avoid with driving. Do not use alcohol with this prescription, Disp: 30 tablet, Rfl: 0  •  ranolazine (RANEXA) 500 MG 12 hr tablet, Take 500 mg by mouth 2 (Two) Times a Day., Disp: , Rfl: .        He  denies medication side effects.      All of the other chronic condition(s) listed above are stable w/o issues.    /88   Pulse 91   Temp 97.3 °F (36.3 °C)   Resp 16   Ht 188 cm (74\")   Wt 131 kg (288 lb)   SpO2 96%   BMI 36.98 kg/m²     Results for orders placed or performed in visit on 08/27/21   CBC & Differential    Specimen: Blood   Result Value Ref Range    WBC 7.90 3.40 - 10.80 10*3/mm3    RBC 4.64 4.14 - 5.80 10*6/mm3    Hemoglobin 16.6 13.0 - 17.7 g/dL    Hematocrit 47.6 37.5 - 51.0 %    .6 (H) 79.0 - 97.0 fL    MCH 35.8 (H) 26.6 - 33.0 pg    MCHC 34.9 31.5 - 35.7 g/dL    RDW 12.9 12.3 - 15.4 %    Platelets 152 140 - 450 10*3/mm3    Neutrophil Rel % 51.1 42.7 - 76.0 %    Lymphocyte Rel % 38.5 19.6 - 45.3 %    Monocyte Rel % 9.6 5.0 - 12.0 %    Eosinophil Rel % 0.3 0.3 - 6.2 %    Basophil Rel % 0.1 0.0 - 1.5 %    Neutrophils Absolute 4.04 1.70 - 7.00 10*3/mm3    Lymphocytes " "Absolute 3.04 0.70 - 3.10 10*3/mm3    Monocytes Absolute 0.76 0.10 - 0.90 10*3/mm3    Eosinophils Absolute 0.02 0.00 - 0.40 10*3/mm3    Basophils Absolute 0.01 0.00 - 0.20 10*3/mm3    Immature Granulocyte Rel % 0.4 0.0 - 0.5 %    Immature Grans Absolute 0.03 0.00 - 0.05 10*3/mm3    nRBC 0.0 0.0 - 0.2 /100 WBC                  Objective     Vital Signs:   /88   Pulse 91   Temp 97.3 °F (36.3 °C)   Resp 16   Ht 188 cm (74\")   Wt 131 kg (288 lb)   SpO2 96%   BMI 36.98 kg/m²      Review of Systems   Constitutional: Negative for chills, fatigue and fever.   HENT: Negative for congestion, sinus pressure and sore throat.    Eyes: Negative for blurred vision and visual disturbance.   Respiratory: Negative for cough and shortness of breath.    Cardiovascular: Negative for chest pain, palpitations and leg swelling.   Gastrointestinal: Negative for abdominal pain, diarrhea, nausea and vomiting.   Endocrine: Negative for cold intolerance, heat intolerance, polydipsia, polyphagia and polyuria.   Genitourinary: Negative for dysuria, frequency and urgency.   Musculoskeletal: Positive for arthralgias (pain in left shoulder), myalgias (pain in left shoulder) and neck pain (pain in left side of neck). Negative for back pain, gait problem, joint swelling and neck stiffness.   Skin: Negative for color change and rash.   Neurological: Negative for syncope, light-headedness and headache.   Hematological: Does not bruise/bleed easily.   Psychiatric/Behavioral: Negative for dysphoric mood, sleep disturbance and stress.         Physical Exam  Vitals and nursing note reviewed.   Constitutional:       General: He is not in acute distress.     Appearance: He is well-developed. He is obese. He is not ill-appearing.   HENT:      Head: Normocephalic and atraumatic.   Eyes:      General: No scleral icterus.        Right eye: No discharge.         Left eye: No discharge.      Conjunctiva/sclera: Conjunctivae normal.      Pupils: Pupils " are equal, round, and reactive to light.   Neck:      Thyroid: No thyromegaly.      Trachea: No tracheal deviation.        Comments: Left-sided cervical tenderness that is reproducible. No swelling or decreased range of motion.  Cardiovascular:      Rate and Rhythm: Normal rate and regular rhythm.      Pulses: Normal pulses.      Heart sounds: Normal heart sounds. No murmur heard.  No gallop.    Pulmonary:      Effort: Pulmonary effort is normal. No respiratory distress.      Breath sounds: Normal breath sounds. No wheezing or rales.   Musculoskeletal:         General: Tenderness present. No swelling, deformity or signs of injury. Normal range of motion.      Left shoulder: Tenderness present. No swelling, deformity, laceration, bony tenderness or crepitus. Normal range of motion. Normal strength. Normal pulse.        Arms:       Cervical back: Normal range of motion and neck supple. No edema, erythema, signs of trauma, rigidity or crepitus. Pain with movement and muscular tenderness present. Normal range of motion.      Right lower leg: No edema.      Left lower leg: No edema.      Comments: Reproducible pain located left-sided cervical area and in upper left shoulder. The pain does not radiate down his left arm. No swelling, decreased range of motion, or decreased strength.    Skin:     General: Skin is warm.      Coloration: Skin is not pale.      Findings: No erythema or rash.   Neurological:      Mental Status: He is alert and oriented to person, place, and time.      Motor: No weakness or abnormal muscle tone.      Coordination: Coordination normal.      Gait: Gait normal.   Psychiatric:         Behavior: Behavior normal.         Thought Content: Thought content normal.         Judgment: Judgment normal.          Result Review :                Assessment and Plan      Diagnoses and all orders for this visit:    1. Muscle spasms of neck (Primary)  -     XR Shoulder 2+ View Left (In Office)  -     XR Spine  Cervical Complete 4 or 5 View (In Office)  -     cyclobenzaprine (FLEXERIL) 5 MG tablet; Take 1 tablet by mouth 3 (Three) Times a Day As Needed for Muscle Spasms for up to 10 days. Avoid with driving. Do not use alcohol with this prescription  Dispense: 30 tablet; Refill: 0  -     Ambulatory Referral to Physical Therapy Evaluate and treat    2. Neck pain without injury  -     XR Shoulder 2+ View Left (In Office)  -     XR Spine Cervical Complete 4 or 5 View (In Office)  -     cyclobenzaprine (FLEXERIL) 5 MG tablet; Take 1 tablet by mouth 3 (Three) Times a Day As Needed for Muscle Spasms for up to 10 days. Avoid with driving. Do not use alcohol with this prescription  Dispense: 30 tablet; Refill: 0  -     Ambulatory Referral to Physical Therapy Evaluate and treat    3. Acute pain of left shoulder  -     XR Shoulder 2+ View Left (In Office)  -     XR Spine Cervical Complete 4 or 5 View (In Office)  -     cyclobenzaprine (FLEXERIL) 5 MG tablet; Take 1 tablet by mouth 3 (Three) Times a Day As Needed for Muscle Spasms for up to 10 days. Avoid with driving. Do not use alcohol with this prescription  Dispense: 30 tablet; Refill: 0  -     Ambulatory Referral to Physical Therapy Evaluate and treat            Follow Up     Return if symptoms worsen or fail to improve.    Patient was given instructions and counseling regarding his condition or for health maintenance advice. Please see specific information pulled into the AVS if appropriate.     -X-rays were reviewed today. Cervical spine X-rays appear to show muscular spasm. X-ray of left shoulder shows possible arthritis involvement. X-rays will be sent to Radiologist for further interpretation.   -Plan of care and current medications were reviewed with TALITA Romero  -Order for referral to physical therapy to evaluate and treat   -Prescription for Cyclobenzaprine 5 mg as needed for 10 days was sent to the patient's pharmacy. The patient was instructed to take  Cyclobenzaprine as needed, do not drink alcohol or drive when taking the medication, and do not take Lorazepam when taking this medication.  -Return for worsening signs or symptoms  -The patient verbalized understanding of all instructions given.

## 2021-11-09 ENCOUNTER — PRIOR AUTHORIZATION (OUTPATIENT)
Dept: FAMILY MEDICINE CLINIC | Facility: CLINIC | Age: 71
End: 2021-11-09

## 2021-11-09 DIAGNOSIS — M62.838 MUSCLE SPASMS OF NECK: ICD-10-CM

## 2021-11-09 DIAGNOSIS — M25.512 ACUTE PAIN OF LEFT SHOULDER: ICD-10-CM

## 2021-11-09 DIAGNOSIS — M54.2 NECK PAIN WITHOUT INJURY: ICD-10-CM

## 2021-11-09 RX ORDER — BACLOFEN 10 MG/1
10 TABLET ORAL 3 TIMES DAILY
Qty: 30 TABLET | Refills: 0 | Status: SHIPPED | OUTPATIENT
Start: 2021-11-09 | End: 2022-01-20

## 2021-11-09 NOTE — TELEPHONE ENCOUNTER
Insurance is DENYING the Cyclobenzaprine.  They suggest either   Tizanidine 4mg or  Baclofen 10mg    Please Advise.  Thanks  Jessica

## 2021-11-19 ENCOUNTER — TREATMENT (OUTPATIENT)
Dept: PHYSICAL THERAPY | Facility: CLINIC | Age: 71
End: 2021-11-19

## 2021-11-19 DIAGNOSIS — M54.2 NECK PAIN WITHOUT INJURY: Primary | ICD-10-CM

## 2021-11-19 DIAGNOSIS — M54.12 CERVICAL RADICULOPATHY: ICD-10-CM

## 2021-11-19 DIAGNOSIS — M25.512 ACUTE PAIN OF LEFT SHOULDER: ICD-10-CM

## 2021-11-19 PROCEDURE — 97535 SELF CARE MNGMENT TRAINING: CPT | Performed by: PHYSICAL THERAPIST

## 2021-11-19 PROCEDURE — 97162 PT EVAL MOD COMPLEX 30 MIN: CPT | Performed by: PHYSICAL THERAPIST

## 2021-11-19 PROCEDURE — 97140 MANUAL THERAPY 1/> REGIONS: CPT | Performed by: PHYSICAL THERAPIST

## 2021-11-19 PROCEDURE — 97012 MECHANICAL TRACTION THERAPY: CPT | Performed by: PHYSICAL THERAPIST

## 2021-11-19 NOTE — PROGRESS NOTES
Physical Therapy Initial Evaluation and Plan of Care    Patient Name: Tony Max          :  1950  Referring Physician: Brandy Castañeda APRN  Diagnosis: Neck pain without injury [M54.2]    Date of Evaluation: 2021  ______________________________________________________________________    Subjective Evaluation    History of Present Illness  Onset date: Couple months ago.  Mechanism of injury: Insidious onset    Per MD NOTE:   pain in the left side of his neck that radiates to the left shoulder. The pain does not radiate down his left arm. There was no known injury or fall. Onset of symptoms was 5 weeks ago. The pain is described as constant and dull. The pain is reduced by applying pressure, heating pad, and Tylenol. He has been going to the chiropractor for 5 weeks and it has not helped    Pain  Current pain ratin  At worst pain rating: 10  Location: (L) neck and into UT to shoulder - down arm and dorsal forearm at times; Denies weakness LUE  Quality: Ache.  Alleviating factors: Lay on (L) Sode and tuck arm under himself - Stand up / walk.  Exacerbated by: Sitting; Looking down;   Progression: no change    Hand dominance: right    Diagnostic Tests  X-ray: abnormal (see report in media)    Treatments  Current treatment: chiropractic  Current treatment comments: Tylenol.     Patient Goals  Patient/family treatment goals: Pain alleviation; Mobility / strength to allow ADLs, etc.         ___________________________________________________  Objective          Postural Observations    Additional Postural Observation Details  Fwd head / rounded shoulder posture- Hypertrophy Supraclavicular region and lower cervical / upper thoracic region central    Tenderness     Additional Tenderness Details  Tender C6-7 central and (L), UT (L)    Active Range of Motion     Additional Active Range of Motion Details  C-SPINE: Flexion 50-deg w/ pain into shoulder blade (L); Ext 40-deg ; SB 30-deg (B) w/ pain into  shoulder blade (L) w/ left SB ;   Rot to (R) WNL; to (L) 50-deg w/ pain in UT/scap region  Shoulder / UE WNL    Tests     Additional Tests Details  (+) Cervical Compression   (+) Cervical Distraction  (+) Spurlings (L)         See Treatment Flow sheet for Exercises, Manual therapy, and modalities.   SELF CARE: X 10 min  · TAPING / BRACING: NA  · Anatomy / dysfunction education  · Jt protection, ADL modification; Posture and     ___________________________________________________  Assessment & Plan     Assessment  Assessment details: 72 yo male: Neck pain; DDD/DJD; Cervical Radiculopathy (L); Possible C6-7 involvement -  Distraction / traction Cspine centralizes LLE radicular symptoms - Would benefit from C-traction -and MRI, Spine referral if symptoms persist -      PROBLEMS: Pain; Limited mobility; Intolerance to ADLs, etc requiring use of LUE and neck mobility.   PROGNOSIS: Good    GOALS:   SHORT TERM GOALS: 2 weeks:  1) HEP Initiated; 2) Pain decreased 50% and centralized:   3) AROM  Neck rotation increased 10-deg (L):  4) Improved functional ability grossly;     LONG TERM GOALS: 4 weeks (or at time of DISCHARGE): 1) (I) HEP; 2) AROM WFL and pain free; 3) Strength / mobility to be able to perform all ADL's and job-related activities w/o restrictions; 4) Demonstrates improved postural awareness w/ minimal cuing -       Plan  Planned therapy interventions: flexibility, home exercise program, manual therapy, neuromuscular re-education, postural training, soft tissue mobilization, spinal/joint mobilization, strengthening, stretching and therapeutic activities (Modalities prn; Taping prn; Cervical Traction (static))  Frequency: 2x week  Duration in weeks: 4  Treatment plan discussed with: patient      ___________________________________________________  Manual Therapy:         mins  89309;  Therapeutic Exercise:    08     mins  52154;     Neuromuscular Nam:        mins  63701;    Therapeutic Activity:           mins  86178;   Self Care:                      10     mins  52429  Ultrasound:     08     mins  07010;  Iontophoresis:          mins  60623;    Electrical Stimulation:         mins  85900 ( );  Mechanical Traction:    15      mins  56515  Dry Needling          mins self-pay    Eval:   20   mins    Timed Treatment:   26   mins                  Total Treatment:     65   mins    PT SIGNATURE:   Bryon Narvaez, PT  DATE TREATMENT INITIATED: 11/19/2021  ___________________________________________________  Initial Certification  Certification Period: 2/17/2022  I certify that the therapy services are furnished while this patient is under my care.  The services outlined above are required by this patient, and will be reviewed every 90 days.     PHYSICIAN: ________________________________  DATE: ______  Via, TALITA Nava        Please sign and return via fax to [phone].. Thank you, [facility].  ______________________________________________________________________  [address] [place] [address]  Phone: [phone] Fax: [phone]

## 2021-11-22 ENCOUNTER — TREATMENT (OUTPATIENT)
Dept: PHYSICAL THERAPY | Facility: CLINIC | Age: 71
End: 2021-11-22

## 2021-11-22 DIAGNOSIS — M25.512 ACUTE PAIN OF LEFT SHOULDER: ICD-10-CM

## 2021-11-22 DIAGNOSIS — M54.2 NECK PAIN WITHOUT INJURY: Primary | ICD-10-CM

## 2021-11-22 PROCEDURE — 97012 MECHANICAL TRACTION THERAPY: CPT | Performed by: PHYSICAL THERAPIST

## 2021-11-22 PROCEDURE — 97530 THERAPEUTIC ACTIVITIES: CPT | Performed by: PHYSICAL THERAPIST

## 2021-11-22 PROCEDURE — 97035 APP MDLTY 1+ULTRASOUND EA 15: CPT | Performed by: PHYSICAL THERAPIST

## 2021-11-22 PROCEDURE — 97110 THERAPEUTIC EXERCISES: CPT | Performed by: PHYSICAL THERAPIST

## 2021-11-22 NOTE — PROGRESS NOTES
Physical Therapy Daily Progress Note    Visit Diagnoses:    ICD-10-CM ICD-9-CM   1. Neck pain without injury  M54.2 723.1   2. Acute pain of left shoulder  M25.512 719.41       VISIT#: 2      Tony Max reports: His neck and L shoulder are feeling the same. No changes.   Current Pain Level:    6-7/10; Worst:   10/10; Best:  0/10 in the morning.   Quality of Pain: Ache, throbbing, needle and pin feeling occasionally and in certain positions  Location Of Pain: L neck and UT to shoulder and down his arm and dorsal forearm.   Response to Previous Session: Good. No issues  Functional Deficits/Irritating Factors: Lay on (L) Sode and tuck arm under himself - Stand up / walk.  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     Patient Education: Pt was educated on exercise biomechanical correctness, intensity, and speed.     Assessment:  No significant changes in patient's presentation this date. Will continue with current PT plan and assess progress with current plan.  Pt will continue to benefit from skilled PT interventions to address current functional deficits and impairments.       Plan: Progress to/Continue with current program.         Manual Therapy:    0     mins  37967;  Ultrasound:   10 mins 71539  Electrical Stimulation: __0____ mins 87908/  Iontophoresis: 0 mins 77720  Traction: 15 mins  09510  Work Conditionin mins 89188  Therapeutic Exercise:    10     mins  85190;     Neuromuscular Nam:    0    mins  36406;    Therapeutic Activity:    10     mins  01634;     Timed Treatment:   20    mins   Total Treatment:     45   mins    Etta Robert, PTA  [place] License # [id number]  Physical Therapist Assistant

## 2021-11-29 ENCOUNTER — TREATMENT (OUTPATIENT)
Dept: PHYSICAL THERAPY | Facility: CLINIC | Age: 71
End: 2021-11-29

## 2021-11-29 DIAGNOSIS — M54.12 CERVICAL RADICULOPATHY: ICD-10-CM

## 2021-11-29 DIAGNOSIS — M54.2 NECK PAIN WITHOUT INJURY: Primary | ICD-10-CM

## 2021-11-29 DIAGNOSIS — M25.512 ACUTE PAIN OF LEFT SHOULDER: ICD-10-CM

## 2021-11-29 PROCEDURE — 97140 MANUAL THERAPY 1/> REGIONS: CPT | Performed by: PHYSICAL THERAPIST

## 2021-11-29 PROCEDURE — 97012 MECHANICAL TRACTION THERAPY: CPT | Performed by: PHYSICAL THERAPIST

## 2021-11-29 PROCEDURE — 97035 APP MDLTY 1+ULTRASOUND EA 15: CPT | Performed by: PHYSICAL THERAPIST

## 2021-11-29 PROCEDURE — 97110 THERAPEUTIC EXERCISES: CPT | Performed by: PHYSICAL THERAPIST

## 2021-11-29 NOTE — PROGRESS NOTES
Physical Therapy Daily Progress Note    Visit Diagnoses:    ICD-10-CM ICD-9-CM   1. Neck pain without injury  M54.2 723.1   2. Acute pain of left shoulder  M25.512 719.41   3. Cervical radiculopathy  M54.12 723.4       VISIT#: 3      Tony Max reports: His neck and shoulder felt pretty good but started hurting him after sitting in the hard chair for a few minutes. He has good and bad days but more good days now.   Current Pain Level:    4-5/10; Worst:   6/10; Best:  0/10  Location Of Pain: L neck and UT to shoulder and down his arm and dorsal forearm.   Quality of Pain: Ache, throbbing, needle and pin feeling occasionally and in certain positions  Response to Previous Session: Good. No issues  Functional Deficits/Irritating Factors: Lay on (L) Sode and tuck arm under himself - Stand up / walk.  Exacerbated by: Sitting; Looking down; lifting, sitting in hard chairs and restaurant booths  Progression: no significant improvements  Compliance with HEP Reported: Yes    Objective  Presents: Fwd head / rounded shoulder posture- Hypertrophy Supraclavicular region and lower cervical / upper thoracic region central  Increased sets/reps of:  none   Increased resistance on:  none  Added to Program: Manual therapy: STM/DTM        See Exercise, Manual, and Modality Logs for complete treatment.     Patient Education: Pt was educated on exercise biomechanical correctness, intensity, and speed.     Assessment:  Noted tenderness in LUT and L shoulder ER's during STM/DTM. Pt able to perform his exercises w/o any issues but did state the pec stretch was uncomfortable in his L shoulder ER area. Pt continues with hypertrophy in his L supraclavicular region with swelling.  Pt will continue to benefit from skilled PT interventions to address current functional deficits and impairments.       Plan: Progress to/Continue with current program. C/S isometrics, Mid rows w/tband?        Manual Therapy:    10     mins  68839;  Ultrasound:   10  mins 76552  Electrical Stimulation: __0____ mins 57672/  Iontophoresis: 0 mins 08981  Traction: 15 mins  31940  Work Conditionin mins 28477  Therapeutic Exercise:    15     mins  79360;     Neuromuscular Nam:    0    mins  65964;    Therapeutic Activity:     0     mins  59799;     Timed Treatment:   25   mins   Total Treatment:     50   mins    Etta Robert PTA  [place] License # [id number]  Physical Therapist Assistant

## 2021-12-03 ENCOUNTER — TREATMENT (OUTPATIENT)
Dept: PHYSICAL THERAPY | Facility: CLINIC | Age: 71
End: 2021-12-03

## 2021-12-03 DIAGNOSIS — M79.18 MYOFASCIAL PAIN: ICD-10-CM

## 2021-12-03 DIAGNOSIS — M54.2 NECK PAIN WITHOUT INJURY: Primary | ICD-10-CM

## 2021-12-03 DIAGNOSIS — M54.12 CERVICAL RADICULOPATHY: ICD-10-CM

## 2021-12-03 DIAGNOSIS — M25.512 ACUTE PAIN OF LEFT SHOULDER: ICD-10-CM

## 2021-12-03 PROCEDURE — 97140 MANUAL THERAPY 1/> REGIONS: CPT | Performed by: PHYSICAL THERAPIST

## 2021-12-03 PROCEDURE — 97530 THERAPEUTIC ACTIVITIES: CPT | Performed by: PHYSICAL THERAPIST

## 2021-12-03 PROCEDURE — 97012 MECHANICAL TRACTION THERAPY: CPT | Performed by: PHYSICAL THERAPIST

## 2021-12-03 PROCEDURE — G0283 ELEC STIM OTHER THAN WOUND: HCPCS | Performed by: PHYSICAL THERAPIST

## 2021-12-03 NOTE — PROGRESS NOTES
Physical Therapy Daily Progress Note    Patient Name: Tony Max         :  1950  Referring Physician: Brandy Castañeda APRN      Subjective   Tony Max reports: neck doing better, but pain into shoulder blade and down the (L) arm about the same -  Some temporary improvement after PT/traction, but symptoms return -     Objective   Pt presents w/ rounded shoulder / fwd head posture -   Multiple painful trigger points along medial scap border which referred pain into (L) Shoulder and down LLE - and at times persistent pressure alleviated these radicular symptoms.  No tenderness C-spine and UT -     See Exercise, Manual, and Modality Logs for complete treatment.     Functional / Therapeutic Activities:  08 min  · TAPING / BRACING: K-Tape to 1) Unload inf/med, med scap border (L); 2) Facilitate scap/shoulder retraction (L) -   · Jt protection, ADL modification; Posture and      Assessment/Plan  72 yo male: Neck pain; DDD/DJD; Cervical Radiculopathy (L); Possible C6-7 involvement -  Distraction / traction Cspine centralizes LLE radicular symptoms - Would benefit from C-traction -and MRI, Spine referral if symptoms persist -  C-symptoms much improved - now pain along medial scap region w/ painful TPs that refer along his (L) shoulder and down LLE - TPs contributing to LLE symptoms -       Progress per Plan of Care       _________________________________________________    Manual Therapy:            10     mins  77821;  Therapeutic Exercise:    05    mins  06819;     Neuromuscular Nam:        mins  85650;    Therapeutic Activity:     08      mins  57150;     Ultrasound:                    08      mins  59944;  Iontophoresis:                     mins  96477;    Electrical Stimulation:     15    mins  71413 ( );  Mechanical Traction:      15    mins  11680  Dry Needling                       mins self-pay     Timed Treatment:   31    mins                  Total Treatment:     70    mins    Bryon  Brice, PT  Physical Therapist

## 2021-12-06 ENCOUNTER — TREATMENT (OUTPATIENT)
Dept: PHYSICAL THERAPY | Facility: CLINIC | Age: 71
End: 2021-12-06

## 2021-12-06 DIAGNOSIS — M54.2 NECK PAIN WITHOUT INJURY: Primary | ICD-10-CM

## 2021-12-06 DIAGNOSIS — M54.12 CERVICAL RADICULOPATHY: ICD-10-CM

## 2021-12-06 DIAGNOSIS — M25.512 ACUTE PAIN OF LEFT SHOULDER: ICD-10-CM

## 2021-12-06 PROCEDURE — 97140 MANUAL THERAPY 1/> REGIONS: CPT | Performed by: PHYSICAL THERAPIST

## 2021-12-06 PROCEDURE — 97012 MECHANICAL TRACTION THERAPY: CPT | Performed by: PHYSICAL THERAPIST

## 2021-12-06 PROCEDURE — G0283 ELEC STIM OTHER THAN WOUND: HCPCS | Performed by: PHYSICAL THERAPIST

## 2021-12-06 PROCEDURE — 97110 THERAPEUTIC EXERCISES: CPT | Performed by: PHYSICAL THERAPIST

## 2021-12-06 NOTE — PROGRESS NOTES
Physical Therapy Daily Progress Note    Visit Diagnoses:    ICD-10-CM ICD-9-CM   1. Neck pain without injury  M54.2 723.1   2. Acute pain of left shoulder  M25.512 719.41   3. Cervical radiculopathy  M54.12 723.4       VISIT#: 5      Tony Max reports: What was done during the last PT session helped him a lot. He is having less pain and less frequent radiculopathy.   Current Pain Level:    4/10; Worst:   7-8/10; Best:  1-2/10  Location Of Pain: L neck and UT to shoulder and down his arm and dorsal forearm.   Quality of Pain: Ache, throbbing, needle and pin feeling occasionally and in certain positions  Response to Previous Session: Good. No issues  Functional Deficits/Irritating Factors: Lay on (L) Sode and tuck arm under himself - Stand up / walk.  Exacerbated by: Sitting; Looking down; lifting, sitting in hard chairs and restaurant booths  Progression: Improving  Compliance with HEP Reported: Yes    Objective  Presents: Fwd head / rounded shoulder posture- Hypertrophy Supraclavicular region and lower cervical / upper thoracic region central  Increased sets/reps of:  Scapular squeezes   Increased resistance on:  none  Added to Program: none        See Exercise, Manual, and Modality Logs for complete treatment.     Patient Education: Pt was educated on exercise biomechanical correctness, intensity, and speed.     Assessment:  Pt is improving per subjective report of decreased pain and decreased frequency of radiculopathy. Tender along medial border of L scapula and L subscapular area. Moderate cuing for improved scapular retraction w/o shoulder elevation.  Pt will continue to benefit from skilled PT interventions to address current functional deficits and impairments.       Plan: Progress to/Continue with current program. Return to evaluating therapist. Assess response to today's session        Manual Therapy:    12     mins  56167;  Ultrasound:   0/10 mins 89113  Electrical Stimulation: __15____ mins  15788/  Iontophoresis: 0 mins 57646  Traction: 15 mins  10765  Work Conditionin mins 85093  Therapeutic Exercise:    12     mins  08052;     Neuromuscular Nam:    0    mins  19006;    Therapeutic Activity:     0     mins  03000;     Timed Treatment:   24   mins   Total Treatment:     64   mins    Etta Robert, MADIE  [place] License # [id number]  Physical Therapist Assistant

## 2021-12-09 ENCOUNTER — TREATMENT (OUTPATIENT)
Dept: PHYSICAL THERAPY | Facility: CLINIC | Age: 71
End: 2021-12-09

## 2021-12-09 DIAGNOSIS — M54.2 NECK PAIN WITHOUT INJURY: Primary | ICD-10-CM

## 2021-12-09 DIAGNOSIS — M54.12 CERVICAL RADICULOPATHY: ICD-10-CM

## 2021-12-09 PROCEDURE — 97140 MANUAL THERAPY 1/> REGIONS: CPT | Performed by: PHYSICAL THERAPIST

## 2021-12-09 PROCEDURE — 97530 THERAPEUTIC ACTIVITIES: CPT | Performed by: PHYSICAL THERAPIST

## 2021-12-09 PROCEDURE — 97012 MECHANICAL TRACTION THERAPY: CPT | Performed by: PHYSICAL THERAPIST

## 2021-12-09 PROCEDURE — G0283 ELEC STIM OTHER THAN WOUND: HCPCS | Performed by: PHYSICAL THERAPIST

## 2021-12-09 NOTE — PROGRESS NOTES
Physical Therapy Daily Progress Note    Patient Name: Tony Max         :  1950  Referring Physician: Brandy Castañeda APRN      Subjective   Toyn Max reports: persistent tingling down LUE into dorsal forearm and hand frequent, especially sitting, looking down, etc.   Pain in posterior shoulder (L) and less in medial scap -  Pain at lower neck  Pt reports the rubber band exercises caused severe pain 10/10 after and the therapist    Objective   Tender posterior shoulder (L) into infraspinatus mm belly  w/ TPs which reproduced his radicular symptoms  Less tender medial scap region - Tender centrally at C6-T1 -     See Exercise, Manual, and Modality Logs for complete treatment.     Functional / Therapeutic Activities: 08  min  · TAPING / BRACING: K-tape to unload Infraspinaus mm belly and facilitate postural correction -         ASSESSMENT;   72 yo male: Neck pain; DDD/DJD; Cervical Radiculopathy (L); Possible C6-7 involvement -  Distraction / traction Cspine centralizes LLE radicular symptoms - Would benefit from C-traction -   Would benefit from further assessment (I.e. MRI spine, spine referral, etc) due to persistent radicular symptoms despite regular PT including Cervical Traction, etc    Progress per Plan of Care - DISCONTINUE SCAP ROWS due to significant increase in pain.  Recommend further assessment (I.e. MRI spine, spine referral, etc) due to persistent radicular symptoms despite regular PT including Cervical Traction, etc       _________________________________________________    Manual Therapy:            10     mins  07833;  Therapeutic Exercise:    08    mins  32009;     Neuromuscular Nam:        mins  03955;    Therapeutic Activity:      08     mins  68584;     Ultrasound:                    06      mins  71330;  Iontophoresis:                     mins  79076;    Electrical Stimulation:     15    mins  53450 ( );  Mechanical Traction:      15    mins  20979  Dry Needling                        mins self-pay     Timed Treatment:   32    mins                  Total Treatment:     65    mins    Bryon Narvaez, PT  Physical Therapist

## 2021-12-13 ENCOUNTER — TREATMENT (OUTPATIENT)
Dept: PHYSICAL THERAPY | Facility: CLINIC | Age: 71
End: 2021-12-13

## 2021-12-13 DIAGNOSIS — M54.2 NECK PAIN WITHOUT INJURY: Primary | ICD-10-CM

## 2021-12-13 DIAGNOSIS — M54.12 CERVICAL RADICULOPATHY: ICD-10-CM

## 2021-12-13 PROCEDURE — 97012 MECHANICAL TRACTION THERAPY: CPT | Performed by: PHYSICAL THERAPIST

## 2021-12-13 PROCEDURE — 97110 THERAPEUTIC EXERCISES: CPT | Performed by: PHYSICAL THERAPIST

## 2021-12-13 PROCEDURE — 97035 APP MDLTY 1+ULTRASOUND EA 15: CPT | Performed by: PHYSICAL THERAPIST

## 2021-12-13 PROCEDURE — 97140 MANUAL THERAPY 1/> REGIONS: CPT | Performed by: PHYSICAL THERAPIST

## 2021-12-13 NOTE — PROGRESS NOTES
Physical Therapy Daily Progress Note    Visit Diagnoses:    ICD-10-CM ICD-9-CM   1. Neck pain without injury  M54.2 723.1   2. Cervical radiculopathy  M54.12 723.4       VISIT#: 7      Tony Max reports: His L UT and C/S are bothering him the most today. The shoulder blade is not too bad.   Current Pain Level:    5/10; Worst:   9/10; Best:  2-3/10  Location Of Pain: L UT and L C/S  Quality of Pain:  Ache, throbbing, needle and pin feeling occasionally and in certain positions  Response to Previous Session: Good. No issues  Functional Deficits/Irritating Factors: Can't lay on his R side, sitting in hard chairs, pulling  Progression: Improving  Compliance with HEP Reported: Yes    Objective  Presents: Fwd head / rounded shoulder posture- Hypertrophy Supraclavicular region and lower cervical / upper thoracic region central  Increased sets/reps of:  nbone   Increased resistance on:  none  Added to Program: B shoulder ER        See Exercise, Manual, and Modality Logs for complete treatment.     Patient Education: Pt was educated on exercise biomechanical correctness, intensity, and speed.     Assessment:  Pt progressing slowly. Improvement in his shoulder blade and no more pain on the medial side. Still pain in infraspinatus/lateral scapula area.  Pt will continue to benefit from skilled PT interventions to address current functional deficits and impairments.       Plan: Progress to/Continue with current program. Return to evaluating therapist. Assess response to today's session.        Manual Therapy:    8     mins  32178;  Ultrasound:   10 mins 32361  Electrical Stimulation: __15____ mins 29770/  Iontophoresis: 0 mins 76468  Traction: 15 mins  41006  Work Conditionin mins 73361  Therapeutic Exercise:    10     mins  73847;     Neuromuscular Nam:    0    mins  06328;    Therapeutic Activity:     0     mins  85742;     Timed Treatment:   18   mins   Total Treatment:     38   mins    Etta Robert, MADIE SANCHEZ  License # [id number]  Physical Therapist Assistant

## 2021-12-16 ENCOUNTER — TREATMENT (OUTPATIENT)
Dept: PHYSICAL THERAPY | Facility: CLINIC | Age: 71
End: 2021-12-16

## 2021-12-16 DIAGNOSIS — M79.18 MYOFASCIAL PAIN: ICD-10-CM

## 2021-12-16 DIAGNOSIS — M54.12 CERVICAL RADICULOPATHY: ICD-10-CM

## 2021-12-16 DIAGNOSIS — M54.2 NECK PAIN WITHOUT INJURY: Primary | ICD-10-CM

## 2021-12-16 PROCEDURE — 97012 MECHANICAL TRACTION THERAPY: CPT | Performed by: PHYSICAL THERAPIST

## 2021-12-16 PROCEDURE — G0283 ELEC STIM OTHER THAN WOUND: HCPCS | Performed by: PHYSICAL THERAPIST

## 2021-12-16 PROCEDURE — 97035 APP MDLTY 1+ULTRASOUND EA 15: CPT | Performed by: PHYSICAL THERAPIST

## 2021-12-16 PROCEDURE — 97140 MANUAL THERAPY 1/> REGIONS: CPT | Performed by: PHYSICAL THERAPIST

## 2021-12-16 NOTE — PROGRESS NOTES
Physical Therapy Daily Progress Note    Patient Name: Tony Max         :  1950  Referring Physician: Brandy Castañeda APRN      Subjective   Tony Max reports: persistent intermittent pain / Tingling down (L) arm - Noted pain posterior shoulder / scapula (L) - Treatment on the area helpful last time - No complaint of neck / UT pain today - Played golf twice recently and rubbed vicks vapor rub on this area of his shoulder at the start and after 9 holes and did not notice his pain much -  Does not have a follow up appt w/ referring provider   No change in UE symptoms -   Band exercises increased pain from (L) neck down his entire Left arm -     Objective   Rounded shoulder / fwd head posture -   Tender infraspinatus mm belly into posterior shoulder w/ TPs - (L)    See Exercise, Manual, and Modality Logs for complete treatment.     Functional / Therapeutic Activities:   min  · TAPING / BRACING:K-tape to unload infraspinatus / facil shoulder retraction -   · Jt protection, ADL modification; Posture and      Assessment/Plan  70 yo male: Neck pain; DDD/DJD; Cervical Radiculopathy (L); Possible C6-7 involvement -  Distraction / traction Cspine centralizes LLE radicular symptoms -  -   Would benefit from further assessment (I.e. MRI spine, spine referral, etc) due to persistent radicular symptoms despite regular PT including Cervical Traction, etc     Progress per Plan of Care - DISCONTINUE SCAP ROWS due to significant increase in pain.  Recommend further assessment (I.e. MRI spine, spine referral, etc) due to persistent radicular symptoms despite regular PT including Cervical Traction, etc    Progress per Plan of Care  - Pt to make an appointment w/ referring provider -        _________________________________________________    Manual Therapy:            15     mins  20655;  Therapeutic Exercise:        mins  15332;     Neuromuscular Nam:        mins  60920;    Therapeutic Activity:            mins  93384;     Ultrasound:                    08      mins  33364;  Iontophoresis:                     mins  04001;    Electrical Stimulation:     15    mins  13177 ( );  Mechanical Traction:      15    mins  14665  Dry Needling                       mins self-pay     Timed Treatment:   23    mins                  Total Treatment:     60    mins    Bryon Narvaez, PT  Physical Therapist

## 2021-12-20 ENCOUNTER — TREATMENT (OUTPATIENT)
Dept: PHYSICAL THERAPY | Facility: CLINIC | Age: 71
End: 2021-12-20

## 2021-12-20 DIAGNOSIS — M54.12 CERVICAL RADICULOPATHY: ICD-10-CM

## 2021-12-20 DIAGNOSIS — M54.2 NECK PAIN WITHOUT INJURY: Primary | ICD-10-CM

## 2021-12-20 PROCEDURE — 97012 MECHANICAL TRACTION THERAPY: CPT | Performed by: PHYSICAL THERAPIST

## 2021-12-20 PROCEDURE — 97110 THERAPEUTIC EXERCISES: CPT | Performed by: PHYSICAL THERAPIST

## 2021-12-20 NOTE — PROGRESS NOTES
Physical Therapy Daily Progress Note    Visit Diagnoses:    ICD-10-CM ICD-9-CM   1. Neck pain without injury  M54.2 723.1   2. Cervical radiculopathy  M54.12 723.4       VISIT#: 9      Tony Max reports: His shoulder blade and upper trap area are doing better and ok. He is mostly having pain in his L posterior and medial shoulder. It goes down the medial shoulder at times. He still has tingling in the dorsal surface of his hand.  He reports no significant changes since last session. He is trying to get an appointment with his primary care to get an MRI but so far has not been able to get in to see him.   Current Pain Level:    2-3/10  Location Of Pain: L UT and L C/S  Quality of Pain: Ache, throbbing, needle and pin feeling occasionally and in certain positions  Response to Previous Session: Good. No issues  Functional Deficits/Irritating Factors: Can't lay on his R side, sitting in hard chairs, pulling  Progression: No changes since last session  Compliance with HEP Reported: Yes    Objective  Presents: Fwd head / rounded shoulder posture- Hypertrophy Supraclavicular region and lower cervical / upper thoracic region central  Increased sets/reps of:  none   Increased resistance on:  none  Added to Program: none        See Exercise, Manual, and Modality Logs for complete treatment.     Patient Education: Pt was educated on exercise biomechanical correctness, intensity, and speed.     Assessment:  Pt reports no significant changes since last session. He is seeking additional medical intervention for an MRI. Is able to perform his current exercises w/o difficulty. Pt will continue to benefit from skilled PT interventions to address current functional deficits and impairments.       Plan: Progress to/Continue with current program. Return to evaluating therapist. Ask about appt with primary         Manual Therapy:    0     mins  91360;  Ultrasound:   0 mins 35587  Electrical Stimulation: __0____ mins  38454/  Iontophoresis: 0 mins 06070  Traction: 15 mins  01986  Work Conditionin mins 30720  Therapeutic Exercise:    10     mins  66581;     Neuromuscular Nam:    0    mins  49631;    Therapeutic Activity:     0     mins  19347;     Timed Treatment:   10   mins   Total Treatment:     25   mins    Etta Robert, [facility]  [place] License # [id number]  Physical Therapist Assistant

## 2021-12-27 ENCOUNTER — TREATMENT (OUTPATIENT)
Dept: PHYSICAL THERAPY | Facility: CLINIC | Age: 71
End: 2021-12-27

## 2021-12-27 DIAGNOSIS — M54.12 CERVICAL RADICULOPATHY: ICD-10-CM

## 2021-12-27 DIAGNOSIS — M54.2 NECK PAIN WITHOUT INJURY: Primary | ICD-10-CM

## 2021-12-27 PROCEDURE — 97110 THERAPEUTIC EXERCISES: CPT | Performed by: PHYSICAL THERAPIST

## 2021-12-27 PROCEDURE — 97012 MECHANICAL TRACTION THERAPY: CPT | Performed by: PHYSICAL THERAPIST

## 2021-12-27 NOTE — PROGRESS NOTES
Physical Therapy Daily Progress Note    Visit Diagnoses:    ICD-10-CM ICD-9-CM   1. Neck pain without injury  M54.2 723.1   2. Cervical radiculopathy  M54.12 723.4       VISIT#: 10      Tony Max reports: He is actually doing pretty good. He is not having much tingling in his arm anymore - only in certain positions. His neck and shoulder are doing well. Since the last treatment he has been feeling pretty good. Hard chairs have not bothered him. He ate in a garcia and that did not bother him either.   Current Pain Level:    0/10; Worst:   1/10; Best:  0/10  Location Of Pain: L posterior and medial shoulder, tingling in the dorsal surface of his L hand but less frequent and intense now  Quality of Pain: needle and pin feeling occasionally and in certain positions  Response to Previous Session: Good. No issues  Functional Deficits/Irritating Factors: Can't lay on his R side, pulling  Progression: Improving  Compliance with HEP Reported: Yes    Objective  Presents: Fwd head / rounded shoulder posture- Hypertrophy Supraclavicular region and lower cervical / upper thoracic region central  Increased sets/reps of:  none   Increased resistance on:  B shoulder ER, Scapular retraction  Added to Program: B shoulder extension        See Exercise, Manual, and Modality Logs for complete treatment.     Patient Education: Pt was educated on exercise biomechanical correctness, intensity, and speed.     Assessment:  Pt doing much better. He reports minimal symptoms now and improved function. Began some exercise progression to challenge his tolerance. Pt tolerated new exercises well and w/o any issues/increased symptoms during the session.  Pt will continue to benefit from skilled PT interventions to address current functional deficits and impairments.       Plan: Progress to/Continue with current program. Cervical stabilization exercises, isometric c/s        Manual Therapy:    0     mins  49279;  Ultrasound:   0 mins  22328  Electrical Stimulation: __0____ mins 27277/  Iontophoresis: 0 mins 26994  Traction: 15 mins  92420  Work Conditionin mins 28030  Therapeutic Exercise:    10     mins  04733;     Neuromuscular Nam:    0    mins  49498;    Therapeutic Activity:     0     mins  39768;     Timed Treatment:   10   mins   Total Treatment:     25   mins    Etta Robert [facility] License # [id number]  Physical Therapist Assistant

## 2021-12-30 ENCOUNTER — TREATMENT (OUTPATIENT)
Dept: PHYSICAL THERAPY | Facility: CLINIC | Age: 71
End: 2021-12-30

## 2021-12-30 DIAGNOSIS — M79.18 MYOFASCIAL PAIN: ICD-10-CM

## 2021-12-30 DIAGNOSIS — M54.2 NECK PAIN WITHOUT INJURY: Primary | ICD-10-CM

## 2021-12-30 DIAGNOSIS — M54.12 CERVICAL RADICULOPATHY: ICD-10-CM

## 2021-12-30 PROCEDURE — 97110 THERAPEUTIC EXERCISES: CPT | Performed by: PHYSICAL THERAPIST

## 2021-12-30 PROCEDURE — 97530 THERAPEUTIC ACTIVITIES: CPT | Performed by: PHYSICAL THERAPIST

## 2021-12-30 PROCEDURE — 97012 MECHANICAL TRACTION THERAPY: CPT | Performed by: PHYSICAL THERAPIST

## 2021-12-30 NOTE — PROGRESS NOTES
Physical Therapy Daily Progress Note  RE-ASSESSMENT     Patient Name: Tony Max         :  1950  Referring Physician: Brandy Castañeda APRN      Subjective   Tony Max reports: continued improvement with decreasing neck, shoulder and UE pain and minimal radicular symptoms down UE -     Objective   Improved posturing and tolerance to PT activities -   AROM C-spine WFL and UE WFL  Strength WF/NL (B) UE Myotomes    See Exercise, Manual, and Modality Logs for complete treatment.     Functional / Therapeutic Activities:  08 min  · TAPING / BRACING: NA  · Functional assessment -  · Jt protection, ADL modification; Posture and      Assessment/Plan  72 yo male: Neck pain; DDD/DJD; Cervical Radiculopathy (L); Possible C6-7 involvement -  Distraction / traction Cspine centralizes LLE radicular symptoms -  -   Much improved w/ decreased pain and radicular symptoms and improved tolerance to TE, etc  GOAL STATUS:   STG: All met  LTG: Progressing towards pain and function goals -   Tony would benefit from continued Physical Therapy to address LTGs -     Progress per Plan of Care 2x/wk x 4 weeks -        _________________________________________________    Manual Therapy:                 mins  71048;  Therapeutic Exercise:    15    mins  33346;     Neuromuscular Nam:        mins  44589;    Therapeutic Activity:     08      mins  38144;     Ultrasound:                          mins  40783;  Iontophoresis:                     mins  00889;    Electrical Stimulation:         mins  95253 ( );  Mechanical Traction:      15    mins  98287  Dry Needling                       mins self-pay     Timed Treatment:   23    mins                  Total Treatment:     45    mins    Bryon Narvaez, PT  Physical Therapist

## 2022-01-10 ENCOUNTER — TREATMENT (OUTPATIENT)
Dept: PHYSICAL THERAPY | Facility: CLINIC | Age: 72
End: 2022-01-10

## 2022-01-10 DIAGNOSIS — M54.2 NECK PAIN WITHOUT INJURY: Primary | ICD-10-CM

## 2022-01-10 DIAGNOSIS — M54.12 CERVICAL RADICULOPATHY: ICD-10-CM

## 2022-01-10 PROCEDURE — 97112 NEUROMUSCULAR REEDUCATION: CPT | Performed by: PHYSICAL THERAPIST

## 2022-01-10 PROCEDURE — 97012 MECHANICAL TRACTION THERAPY: CPT | Performed by: PHYSICAL THERAPIST

## 2022-01-10 NOTE — PROGRESS NOTES
Physical Therapy Daily Progress Note    Visit Diagnoses:    ICD-10-CM ICD-9-CM   1. Neck pain without injury  M54.2 723.1   2. Cervical radiculopathy  M54.12 723.4       VISIT#: 12      Tony Max reports: His neck is doing alright. He still has some needles and pins but no pain.   Current Pain Level:    0/10; Worst:   0/10; Best:  0/10  Location Of Pain: tingling in the dorsal surface of his L hand but less frequent and intense now  Quality of Pain: needle and pin feeling occasionally and in certain positions  Response to Previous Session: Good. No issues  Functional Deficits/Irritating Factors: Can't lay on his R side, pulling  Progression: Improving  Compliance with HEP Reported: Yes    Objective  Presents: Fwd head / rounded shoulder posture but improved  Increased sets/reps of:  Rows and Extension   Increased resistance on:  none  Added to Program: none        See Exercise, Manual, and Modality Logs for complete treatment.     Patient Education: Pt was educated on exercise biomechanical correctness, intensity, and speed.     Assessment:  Pt no longer having any pain but is still experiencing intermittent pins and needles in his L hand. Slightly increased the volume of his exercises today but not intensity. Did well with these changes.   Pt will continue to benefit from skilled PT interventions to address current functional deficits and impairments.       Plan: Progress to/Continue with current program.         Manual Therapy:    0     mins  98462;  Ultrasound:   0 mins 83151  Electrical Stimulation: __0____ mins 86286/  Iontophoresis: 0 mins 78215  Traction: 15 mins  92963  Work Conditionin mins 9745  Therapeutic Exercise:    0     mins  82277;     Neuromuscular Nam:    10    mins  08766;    Therapeutic Activity:     0     mins  67846;     Timed Treatment:   10   mins   Total Treatment:     25   mins    MADIE Riddle License # [id number]  Physical Therapist Assistant

## 2022-01-20 ENCOUNTER — OFFICE VISIT (OUTPATIENT)
Dept: FAMILY MEDICINE CLINIC | Facility: CLINIC | Age: 72
End: 2022-01-20

## 2022-01-20 VITALS
HEART RATE: 95 BPM | WEIGHT: 297 LBS | SYSTOLIC BLOOD PRESSURE: 160 MMHG | BODY MASS INDEX: 38.12 KG/M2 | HEIGHT: 74 IN | TEMPERATURE: 96.1 F | DIASTOLIC BLOOD PRESSURE: 100 MMHG | OXYGEN SATURATION: 96 %

## 2022-01-20 DIAGNOSIS — I10 BENIGN ESSENTIAL HYPERTENSION: ICD-10-CM

## 2022-01-20 DIAGNOSIS — E78.49 OTHER HYPERLIPIDEMIA: ICD-10-CM

## 2022-01-20 DIAGNOSIS — E03.9 HYPOTHYROIDISM, ACQUIRED: ICD-10-CM

## 2022-01-20 DIAGNOSIS — M54.2 NECK PAIN WITHOUT INJURY: Primary | ICD-10-CM

## 2022-01-20 PROBLEM — J01.00 ACUTE MAXILLARY SINUSITIS: Status: RESOLVED | Noted: 2017-08-29 | Resolved: 2022-01-20

## 2022-01-20 PROBLEM — J30.9 ALLERGIC RHINITIS: Status: RESOLVED | Noted: 2020-08-30 | Resolved: 2022-01-20

## 2022-01-20 PROBLEM — R07.89 ATYPICAL CHEST PAIN: Status: RESOLVED | Noted: 2018-08-21 | Resolved: 2022-01-20

## 2022-01-20 PROCEDURE — 99213 OFFICE O/P EST LOW 20 MIN: CPT | Performed by: FAMILY MEDICINE

## 2022-01-20 NOTE — PROGRESS NOTES
"Chief Complaint  Neck Pain    Subjective          Tony presents to [facility]  To follow-up on his neck issues.  He has finished physical therapy and symptoms are improving.  He still has some minimal tingling episodes down the left shoulder area.  This problem has been present for the past 2 months and might be related to playing golf but no definitive injury happened.  Today I did look at the results of the x-rays done at our last visit and it confirms some degenerative disc disease of the neck area.  No other abnormalities were identified.            Objective   Vital Signs:   Vitals:    01/20/22 1527   BP: 160/100   BP Location: Right arm   Patient Position: Sitting   Cuff Size: Large Adult   Pulse: 95   Temp: 96.1 °F (35.6 °C)   SpO2: 96%   Weight: 135 kg (297 lb)   Height: 188 cm (74.02\")        Physical Exam  Neck:      Thyroid: No thyroid tenderness.   Musculoskeletal:      Cervical back: Normal range of motion. Normal range of motion.   Neurological:      Comments: Normal  strength          Result Review :     The following data was reviewed by: Lane Puri MD on 01/20/2022:  XR Shoulder 2+ View Left (In Office) (11/09/2021 14:26)           Assessment and Plan    Diagnoses and all orders for this visit:    1. Neck pain without injury (Primary)  Comments:  Continue with physical therapy exercises.  Reexamine in March.    2. Benign essential hypertension  -     Comprehensive Metabolic Panel; Future  -     CBC & Differential; Future  -     MicroAlbumin, Urine, Random - Urine, Clean Catch; Future    3. Other hyperlipidemia  -     Lipid Panel With / Chol / HDL Ratio; Future  -     CK; Future    4. Hypothyroidism, acquired  -     TSH; Future        Follow Up   Return in 7 weeks (on 3/7/2022) for next scheduled follow up.  Patient was given instructions and counseling regarding his condition or for health maintenance advice. Please see specific information pulled into the AVS " if appropriate.

## 2022-02-24 DIAGNOSIS — E03.9 HYPOTHYROIDISM, ACQUIRED: ICD-10-CM

## 2022-02-24 DIAGNOSIS — I10 BENIGN ESSENTIAL HYPERTENSION: ICD-10-CM

## 2022-02-24 DIAGNOSIS — E78.49 OTHER HYPERLIPIDEMIA: ICD-10-CM

## 2022-03-01 LAB
ALBUMIN SERPL-MCNC: 4.2 G/DL (ref 3.7–4.7)
ALBUMIN/GLOB SERPL: 1.9 {RATIO} (ref 1.2–2.2)
ALP SERPL-CCNC: 114 IU/L (ref 44–121)
ALT SERPL-CCNC: 28 IU/L (ref 0–44)
AST SERPL-CCNC: 20 IU/L (ref 0–40)
BASOPHILS # BLD AUTO: 0 X10E3/UL (ref 0–0.2)
BASOPHILS NFR BLD AUTO: 0 %
BILIRUB SERPL-MCNC: 3.6 MG/DL (ref 0–1.2)
BUN SERPL-MCNC: 20 MG/DL (ref 8–27)
BUN/CREAT SERPL: 15 (ref 10–24)
CALCIUM SERPL-MCNC: 8.7 MG/DL (ref 8.6–10.2)
CHLORIDE SERPL-SCNC: 106 MMOL/L (ref 96–106)
CHOLEST SERPL-MCNC: 96 MG/DL (ref 100–199)
CHOLEST/HDLC SERPL: 2.8 RATIO (ref 0–5)
CK SERPL-CCNC: 209 U/L (ref 41–331)
CO2 SERPL-SCNC: 22 MMOL/L (ref 20–29)
CREAT SERPL-MCNC: 1.33 MG/DL (ref 0.76–1.27)
EGFR GENE MUT ANL BLD/T: 57 ML/MIN/1.73
EOSINOPHIL # BLD AUTO: 0 X10E3/UL (ref 0–0.4)
EOSINOPHIL NFR BLD AUTO: 0 %
ERYTHROCYTE [DISTWIDTH] IN BLOOD BY AUTOMATED COUNT: 13.1 % (ref 11.6–15.4)
GLOBULIN SER CALC-MCNC: 2.2 G/DL (ref 1.5–4.5)
GLUCOSE SERPL-MCNC: 112 MG/DL (ref 65–99)
HCT VFR BLD AUTO: 48.6 % (ref 37.5–51)
HDLC SERPL-MCNC: 34 MG/DL
HGB BLD-MCNC: 17 G/DL (ref 13–17.7)
IMM GRANULOCYTES # BLD AUTO: 0 X10E3/UL (ref 0–0.1)
IMM GRANULOCYTES NFR BLD AUTO: 0 %
LDLC SERPL CALC-MCNC: 44 MG/DL (ref 0–99)
LYMPHOCYTES # BLD AUTO: 3.3 X10E3/UL (ref 0.7–3.1)
LYMPHOCYTES NFR BLD AUTO: 39 %
MCH RBC QN AUTO: 34.5 PG (ref 26.6–33)
MCHC RBC AUTO-ENTMCNC: 35 G/DL (ref 31.5–35.7)
MCV RBC AUTO: 99 FL (ref 79–97)
MICROALBUMIN UR-MCNC: 97.6 UG/ML
MONOCYTES # BLD AUTO: 0.8 X10E3/UL (ref 0.1–0.9)
MONOCYTES NFR BLD AUTO: 9 %
NEUTROPHILS # BLD AUTO: 4.4 X10E3/UL (ref 1.4–7)
NEUTROPHILS NFR BLD AUTO: 52 %
PLATELET # BLD AUTO: 155 X10E3/UL (ref 150–450)
POTASSIUM SERPL-SCNC: 4.1 MMOL/L (ref 3.5–5.2)
PROT SERPL-MCNC: 6.4 G/DL (ref 6–8.5)
RBC # BLD AUTO: 4.93 X10E6/UL (ref 4.14–5.8)
SODIUM SERPL-SCNC: 143 MMOL/L (ref 134–144)
TRIGL SERPL-MCNC: 92 MG/DL (ref 0–149)
TSH SERPL DL<=0.005 MIU/L-ACNC: 0.09 UIU/ML (ref 0.45–4.5)
VLDLC SERPL CALC-MCNC: 18 MG/DL (ref 5–40)
WBC # BLD AUTO: 8.5 X10E3/UL (ref 3.4–10.8)

## 2022-03-07 ENCOUNTER — OFFICE VISIT (OUTPATIENT)
Dept: FAMILY MEDICINE CLINIC | Facility: CLINIC | Age: 72
End: 2022-03-07

## 2022-03-07 VITALS
BODY MASS INDEX: 37.99 KG/M2 | WEIGHT: 296 LBS | RESPIRATION RATE: 16 BRPM | DIASTOLIC BLOOD PRESSURE: 88 MMHG | HEIGHT: 74 IN | TEMPERATURE: 96.9 F | SYSTOLIC BLOOD PRESSURE: 142 MMHG | OXYGEN SATURATION: 97 % | HEART RATE: 93 BPM

## 2022-03-07 DIAGNOSIS — E78.49 OTHER HYPERLIPIDEMIA: ICD-10-CM

## 2022-03-07 DIAGNOSIS — E03.9 HYPOTHYROIDISM, ACQUIRED: Primary | ICD-10-CM

## 2022-03-07 DIAGNOSIS — E66.01 CLASS 2 SEVERE OBESITY DUE TO EXCESS CALORIES WITH SERIOUS COMORBIDITY AND BODY MASS INDEX (BMI) OF 37.0 TO 37.9 IN ADULT: ICD-10-CM

## 2022-03-07 DIAGNOSIS — E80.4 GILBERT'S SYNDROME: ICD-10-CM

## 2022-03-07 DIAGNOSIS — R73.9 ELEVATED BLOOD SUGAR: ICD-10-CM

## 2022-03-07 DIAGNOSIS — J30.2 SEASONAL ALLERGIES: ICD-10-CM

## 2022-03-07 DIAGNOSIS — I10 BENIGN ESSENTIAL HYPERTENSION: ICD-10-CM

## 2022-03-07 PROCEDURE — 99214 OFFICE O/P EST MOD 30 MIN: CPT | Performed by: FAMILY MEDICINE

## 2022-03-07 RX ORDER — LEVOTHYROXINE SODIUM 0.2 MG/1
200 TABLET ORAL DAILY
Qty: 90 TABLET | Refills: 1 | Status: SHIPPED | OUTPATIENT
Start: 2022-03-07 | End: 2022-03-07

## 2022-03-07 RX ORDER — BUSPIRONE HYDROCHLORIDE 15 MG/1
15 TABLET ORAL 4 TIMES DAILY
COMMUNITY
Start: 2022-02-22

## 2022-03-07 RX ORDER — MONTELUKAST SODIUM 10 MG/1
10 TABLET ORAL EVERY EVENING
Qty: 90 TABLET | Refills: 1 | Status: SHIPPED | OUTPATIENT
Start: 2022-03-07 | End: 2022-03-07

## 2022-03-07 RX ORDER — MONTELUKAST SODIUM 10 MG/1
10 TABLET ORAL EVERY EVENING
Qty: 90 TABLET | Refills: 1 | Status: SHIPPED | OUTPATIENT
Start: 2022-03-07 | End: 2022-09-12 | Stop reason: SDUPTHER

## 2022-03-07 RX ORDER — LEVOTHYROXINE SODIUM 0.2 MG/1
200 TABLET ORAL DAILY
Qty: 90 TABLET | Refills: 1 | Status: SHIPPED | OUTPATIENT
Start: 2022-03-07 | End: 2022-09-12 | Stop reason: SDUPTHER

## 2022-03-07 NOTE — ASSESSMENT & PLAN NOTE
Elevated total bilirubin once again identified.  CT scan abdomen 2020 reviewed during today's visit and no liver abnormalities identified on that scan.

## 2022-03-07 NOTE — ASSESSMENT & PLAN NOTE
Patient's (Body mass index is 37.98 kg/m².) indicates that they are morbidly obese (BMI > 40 or > 35 with obesity - related health condition) with health conditions that include hypertension . Weight is improving with lifestyle modifications. BMI is is above average; BMI management plan is completed. We discussed portion control and increasing exercise.

## 2022-03-07 NOTE — PROGRESS NOTES
"Chief Complaint  Establish Care, Hypothyroidism, and Allergies    Subjective     {Problem List  Visit Diagnosis  Review (Popup)  Encounters  Notes  Medications  Labs  Result Review Imaging  Media :23}     Tony presents to [facility] review labs and refill medicines.  Thyroid stable.  Elevated blood sugar once again identified.  He has had interval weight loss noted.  Seasonal allergies stable on current therapy.  Remains under the care especially for lipid control and blood pressure control.  These are improving.          Objective   Vital Signs:   Vitals:    03/07/22 1346   BP: 142/88   Pulse: 93   Resp: 16   Temp: 96.9 °F (36.1 °C)   TempSrc: Skin   SpO2: 97%   Weight: 134 kg (296 lb)   Height: 188 cm (74.02\")        Physical Exam  Vitals reviewed.   Constitutional:       General: He is not in acute distress.     Appearance: He is obese.   Eyes:      General: Lids are normal.      Conjunctiva/sclera: Conjunctivae normal.   Neck:      Vascular: No carotid bruit.      Trachea: No tracheal deviation.   Cardiovascular:      Rate and Rhythm: Normal rate. Rhythm irregularly irregular.      Heart sounds: Normal heart sounds. No murmur heard.  Pulmonary:      Effort: Pulmonary effort is normal.      Breath sounds: Normal breath sounds.   Skin:     General: Skin is warm and dry.   Neurological:      Mental Status: He is alert. He is not disoriented.   Psychiatric:         Speech: Speech normal.         Behavior: Behavior normal. Behavior is cooperative.          Result Review :     The following data was reviewed by: Lane Puri MD on 03/07/2022:  MicroAlbumin, Urine, Random - Urine, Clean Catch (02/28/2022 09:08)  CK (02/28/2022 09:08)  Lipid Panel With / Chol / HDL Ratio (02/28/2022 09:08)  CBC & Differential (02/28/2022 09:08)  Comprehensive Metabolic Panel (02/28/2022 09:08)  TSH (02/28/2022 09:08)           Assessment and Plan    Diagnoses and all orders for this visit:    1. " Hypothyroidism, acquired (Primary)  Assessment & Plan:  Condition is stable. The current medical regimen is effective;  continue present plan and medications.    Orders:  -     TSH; Future  -     levothyroxine (SYNTHROID, LEVOTHROID) 200 MCG tablet; Take 1 tablet by mouth Daily.  Dispense: 90 tablet; Refill: 1    2. Seasonal allergies  Assessment & Plan:  Condition is stable. The current medical regimen is effective;  continue present plan and medications.    Orders:  -     montelukast (SINGULAIR) 10 MG tablet; Take 1 tablet by mouth Every Evening.  Dispense: 90 tablet; Refill: 1    3. Elevated blood sugar  Assessment & Plan:  Continue with diet and exercise for weight loss.  Will monitor with twice yearly labs.    Orders:  -     Comprehensive Metabolic Panel; Future  -     Hemoglobin A1c; Future  -     MicroAlbumin, Urine, Random - Urine, Clean Catch; Future    4. Class 2 severe obesity due to excess calories with serious comorbidity and body mass index (BMI) of 37.0 to 37.9 in adult (HCC)  Assessment & Plan:  Patient's (Body mass index is 37.98 kg/m².) indicates that they are morbidly obese (BMI > 40 or > 35 with obesity - related health condition) with health conditions that include hypertension . Weight is improving with lifestyle modifications. BMI is is above average; BMI management plan is completed. We discussed portion control and increasing exercise.       5. Other hyperlipidemia  -     Lipid Panel With / Chol / HDL Ratio; Future  -     CK; Future    6. Benign essential hypertension  -     Comprehensive Metabolic Panel; Future  -     CBC & Differential; Future  -     MicroAlbumin, Urine, Random - Urine, Clean Catch; Future    7. Gilbert's syndrome  Assessment & Plan:  Elevated total bilirubin once again identified.  CT scan abdomen 2020 reviewed during today's visit and no liver abnormalities identified on that scan.      Other orders  -     Discontinue: levothyroxine (SYNTHROID, LEVOTHROID) 200 MCG  tablet; Take 1 tablet by mouth Daily.  Dispense: 90 tablet; Refill: 1  -     Discontinue: montelukast (SINGULAIR) 10 MG tablet; Take 1 tablet by mouth Every Evening.  Dispense: 90 tablet; Refill: 1      Follow Up   Return in about 6 months (around 9/7/2022) for Adult wellness & medication appt, schedule 30 min.  Patient was given instructions and counseling regarding his condition or for health maintenance advice. Please see specific information pulled into the AVS if appropriate.

## 2022-03-08 ENCOUNTER — PATIENT ROUNDING (BHMG ONLY) (OUTPATIENT)
Dept: FAMILY MEDICINE CLINIC | Facility: CLINIC | Age: 72
End: 2022-03-08

## 2022-03-08 NOTE — PROGRESS NOTES
My name is Debbie the practice manager.    I want to officially welcome you to our practice, and ask about your recent visit.    If you could tell me about your recent visit with us.  What things went well?    We are always looking for ways to make our patients' experience even better.  Do you have any recommendations on ways we can improve?    Overall were you satisfied with your first visit with our practice?    I appreciate you taking the time to answer a few questions today.        Thank you, and have a great day!

## 2022-05-04 ENCOUNTER — DOCUMENTATION (OUTPATIENT)
Dept: PHYSICAL THERAPY | Facility: CLINIC | Age: 72
End: 2022-05-04

## 2022-05-04 NOTE — PROGRESS NOTES
Orthopedic / Sports / Industrial Physical Therapy    Discharge Summary  Discharge Summary from Physical Therapy Report    Patient: Tony Max     :  1950  Referring Physician:  No ref. provider found    Dates  PT visit: 2021 thru 01/10/2022   Number of Visits: 12    Discharge Status of Patient: Pt did not return after 1/10/2022 appt - last appts cancelled due to covid -    Goals: Partially Met    Prognosis:  Good - Pt may have benefitted from continued PT, but was much improved as of last attended session -       DISCHARGE PLAN:  DC {T    Date of Discharge:  2022      Bryon Narvaez PT  Physical Therapist  ______________________________________________________________________  [address] [place] [address]  Phone: [phone] Fax: [phone]

## 2022-09-12 ENCOUNTER — OFFICE VISIT (OUTPATIENT)
Dept: FAMILY MEDICINE CLINIC | Facility: CLINIC | Age: 72
End: 2022-09-12

## 2022-09-12 VITALS
SYSTOLIC BLOOD PRESSURE: 148 MMHG | TEMPERATURE: 98.7 F | HEART RATE: 92 BPM | WEIGHT: 277 LBS | RESPIRATION RATE: 16 BRPM | DIASTOLIC BLOOD PRESSURE: 92 MMHG | HEIGHT: 74 IN | OXYGEN SATURATION: 98 % | BODY MASS INDEX: 35.55 KG/M2

## 2022-09-12 DIAGNOSIS — Z00.00 MEDICARE ANNUAL WELLNESS VISIT, SUBSEQUENT: Primary | ICD-10-CM

## 2022-09-12 DIAGNOSIS — J30.2 SEASONAL ALLERGIES: ICD-10-CM

## 2022-09-12 DIAGNOSIS — N18.31 STAGE 3A CHRONIC KIDNEY DISEASE: ICD-10-CM

## 2022-09-12 DIAGNOSIS — F17.210 CIGARETTE SMOKER: ICD-10-CM

## 2022-09-12 DIAGNOSIS — E66.01 CLASS 2 SEVERE OBESITY DUE TO EXCESS CALORIES WITH SERIOUS COMORBIDITY AND BODY MASS INDEX (BMI) OF 35.0 TO 35.9 IN ADULT: ICD-10-CM

## 2022-09-12 DIAGNOSIS — E03.9 HYPOTHYROIDISM, ACQUIRED: ICD-10-CM

## 2022-09-12 DIAGNOSIS — Z11.59 NEED FOR HEPATITIS C SCREENING TEST: ICD-10-CM

## 2022-09-12 DIAGNOSIS — Z12.11 ENCOUNTER FOR SCREENING FOR MALIGNANT NEOPLASM OF COLON: ICD-10-CM

## 2022-09-12 PROBLEM — Z71.6 TOBACCO ABUSE COUNSELING: Status: ACTIVE | Noted: 2022-09-12

## 2022-09-12 PROCEDURE — G0439 PPPS, SUBSEQ VISIT: HCPCS | Performed by: FAMILY MEDICINE

## 2022-09-12 PROCEDURE — 1170F FXNL STATUS ASSESSED: CPT | Performed by: FAMILY MEDICINE

## 2022-09-12 PROCEDURE — 99214 OFFICE O/P EST MOD 30 MIN: CPT | Performed by: FAMILY MEDICINE

## 2022-09-12 PROCEDURE — 1159F MED LIST DOCD IN RCRD: CPT | Performed by: FAMILY MEDICINE

## 2022-09-12 PROCEDURE — 99407 BEHAV CHNG SMOKING > 10 MIN: CPT | Performed by: FAMILY MEDICINE

## 2022-09-12 RX ORDER — MONTELUKAST SODIUM 10 MG/1
10 TABLET ORAL EVERY EVENING
Qty: 90 TABLET | Refills: 3 | Status: SHIPPED | OUTPATIENT
Start: 2022-09-12 | End: 2023-09-12

## 2022-09-12 RX ORDER — LEVOTHYROXINE SODIUM 175 UG/1
175 TABLET ORAL DAILY
Qty: 90 TABLET | Refills: 3 | Status: SHIPPED | OUTPATIENT
Start: 2022-09-12 | End: 2022-11-21 | Stop reason: SDUPTHER

## 2022-09-12 NOTE — ASSESSMENT & PLAN NOTE
Patient's (Body mass index is 35.55 kg/m².) indicates that they are morbidly obese (BMI > 40 or > 35 with obesity - related health condition) with health conditions that include hypertension and dyslipidemias . Weight is improving with lifestyle modifications. BMI is is above average; BMI management plan is completed. We discussed portion control, increasing exercise and Weight Watchers or other Commercial based weight reduction program.

## 2022-09-12 NOTE — PROGRESS NOTES
The ABCs of the Annual Wellness Visit  Subsequent Medicare Wellness Visit    Chief Complaint   Patient presents with   • Medicare Wellness-subsequent   • Hypothyroidism     6 month f/u      Subjective    History of Present Illness:  Tony Max is a 71 y.o. male who presents for a Subsequent Medicare Wellness Visit.  He is also here for medicine refill.  Labs have been reviewed and TSH is supratherapeutic.  Dose reduction required.  Potentially the thyroid might be related to his elevated blood pressure.  Remainder labs satisfactory.  Allergies stable.    Preventative measures discussed during today's Medicare wellness visit.  Tony Max  reports that he has been smoking cigarettes. He has a 40.00 pack-year smoking history. He has never used smokeless tobacco.. I have educated him on the risk of diseases from using tobacco products such as cancer, COPD and heart disease.     I advised him to quit and he is willing to quit. We have discussed the following method/s for tobacco cessation:  Education Material Counseling.   He will follow up with me in 1 year or sooner to check on his progress.    I spent >10 minutes counseling the patient.         The following portions of the patient's history were reviewed and   updated as appropriate: allergies, current medications, past family history, past medical history, past social history, past surgical history and problem list.    Compared to one year ago, the patient feels his physical   health is the same.    Compared to one year ago, the patient feels his mental   health is the same.    Recent Hospitalizations:  He was not admitted to the hospital during the last year.       Current Medical Providers:  Patient Care Team:  Lane Puri MD as PCP - General (Family Medicine)    Outpatient Medications Prior to Visit   Medication Sig Dispense Refill   • apixaban (ELIQUIS) 5 MG tablet tablet Take 1 tablet by mouth 2 (Two) Times a Day. 60 tablet    • aspirin 81 MG chewable  tablet Chew 1 tablet Daily.     • busPIRone (BUSPAR) 15 MG tablet Take 15 mg by mouth 4 (Four) Times a Day.     • finasteride (PROSCAR) 5 MG tablet Take 5 mg by mouth Daily.     • LORazepam (ATIVAN) 1 MG tablet      • metoprolol succinate XL (TOPROL-XL) 25 MG 24 hr tablet Take 25 mg by mouth Daily.     • sertraline (ZOLOFT) 50 MG tablet Take 50 mg by mouth Every Morning.     • simvastatin (ZOCOR) 20 MG tablet Take 20 mg by mouth Every Night.     • levothyroxine (SYNTHROID, LEVOTHROID) 200 MCG tablet Take 1 tablet by mouth Daily. 90 tablet 1   • montelukast (SINGULAIR) 10 MG tablet Take 1 tablet by mouth Every Evening. 90 tablet 1   • ranolazine (RANEXA) 500 MG 12 hr tablet Take 500 mg by mouth 2 (Two) Times a Day.       No facility-administered medications prior to visit.       No opioid medication identified on active medication list. I have reviewed chart for other potential  high risk medication/s and harmful drug interactions in the elderly.          Aspirin is on active medication list. aspirin recommended by cardiologist, to be reevaluated this october. .      Patient Active Problem List   Diagnosis   • JAIDA (generalized anxiety disorder)   • Atrial fibrillation (HCC)   • Depression   • GERD (gastroesophageal reflux disease)   • History of MI (myocardial infarction)   • Other hyperlipidemia   • Benign essential hypertension   • Hypothyroidism, acquired   • Sleep apnea   • History of BPH   • Elevated blood sugar   • Seasonal allergies   • UTI (urinary tract infection)   • Class 2 severe obesity due to excess calories with serious comorbidity and body mass index (BMI) of 35.0 to 35.9 in adult (HCC)   • Chronic anticoagulation   • Gilbert's syndrome   • Cigarette smoker   • Stage 3a chronic kidney disease (HCC)     Advance Care Planning  Advance Directive is not on file.  ACP discussion was held with the patient during this visit. Patient does not have an advance directive, information provided.          Objective  "   Vitals:    09/12/22 1412   BP: 148/92   Pulse: 92   Resp: 16   Temp: 98.7 °F (37.1 °C)   SpO2: 98%   Weight: 126 kg (277 lb)   Height: 188 cm (74.02\")     Estimated body mass index is 35.55 kg/m² as calculated from the following:    Height as of this encounter: 188 cm (74.02\").    Weight as of this encounter: 126 kg (277 lb).    Class 2 Severe Obesity (BMI >=35 and <=39.9). Obesity-related health conditions include the following: hypertension and dyslipidemias. Obesity is unchanged. BMI is is above average; BMI management plan is completed. We discussed portion control, increasing exercise and Weight Watchers or other Commercial based weight reduction program.      Does the patient have evidence of cognitive impairment? No    Physical Exam  Vitals reviewed.   Constitutional:       General: He is not in acute distress.     Appearance: He is obese.   Eyes:      General: Lids are normal.      Conjunctiva/sclera: Conjunctivae normal.   Neck:      Vascular: No carotid bruit.      Trachea: No tracheal deviation.   Cardiovascular:      Rate and Rhythm: Normal rate and regular rhythm.      Heart sounds: Normal heart sounds. No murmur heard.  Pulmonary:      Effort: Pulmonary effort is normal.      Breath sounds: Normal breath sounds.   Skin:     General: Skin is warm and dry.   Neurological:      Mental Status: He is alert. He is not disoriented.   Psychiatric:         Speech: Speech normal.         Behavior: Behavior normal. Behavior is cooperative.       Lab Results   Component Value Date    CHLPL 88 09/08/2022    TRIG 66 09/08/2022    HDL 36 (L) 09/08/2022    LDL 37 09/08/2022    VLDL 15 09/08/2022    HGBA1C 5.60 09/08/2022   MicroAlbumin, Urine, Random - Urine, Clean Catch (09/08/2022 08:45)  Hemoglobin A1c (09/08/2022 08:45)  TSH (09/08/2022 08:45)  CK (09/08/2022 08:45)  Lipid Panel With / Chol / HDL Ratio (09/08/2022 08:45)  CBC & Differential (09/08/2022 08:45)  Comprehensive Metabolic Panel (09/08/2022 " 08:45)           HEALTH RISK ASSESSMENT    Smoking Status:  Social History     Tobacco Use   Smoking Status Current Every Day Smoker   • Packs/day: 1.00   • Years: 40.00   • Pack years: 40.00   • Types: Cigarettes   Smokeless Tobacco Never Used     Alcohol Consumption:  Social History     Substance and Sexual Activity   Alcohol Use No     Fall Risk Screen:    MOMO Fall Risk Assessment was completed, and patient is at LOW risk for falls.Assessment completed on:9/12/2022    Depression Screening:  PHQ-2/PHQ-9 Depression Screening 9/12/2022   Retired Total Score -   Little Interest or Pleasure in Doing Things 0-->not at all   Feeling Down, Depressed or Hopeless 1-->several days   PHQ-9: Brief Depression Severity Measure Score 1       Health Habits and Functional and Cognitive Screening:  Functional & Cognitive Status 9/12/2022   Do you have difficulty preparing food and eating? No   Do you have difficulty bathing yourself, getting dressed or grooming yourself? No   Do you have difficulty using the toilet? No   Do you have difficulty moving around from place to place? No   Do you have trouble with steps or getting out of a bed or a chair? No   Current Diet Unhealthy Diet   Dental Exam Up to date   Eye Exam Up to date   Exercise (times per week) 1 times per week   Current Exercises Include (No Data)        Exercise Comment golf   Do you need help using the phone?  No   Are you deaf or do you have serious difficulty hearing?  No   Do you need help with transportation? No   Do you need help shopping? No   Do you need help preparing meals?  No   Do you need help with housework?  No   Do you need help with laundry? No   Do you need help taking your medications? No   Do you need help managing money? No   Do you ever drive or ride in a car without wearing a seat belt? No   Have you felt unusual stress, anger or loneliness in the last month? Yes   Who do you live with? Spouse   If you need help, do you have trouble finding  someone available to you? No   Do you have difficulty concentrating, remembering or making decisions? Yes       Age-appropriate Screening Schedule:  Refer to the list below for future screening recommendations based on patient's age, sex and/or medical conditions. Orders for these recommended tests are listed in the plan section. The patient has been provided with a written plan.    Health Maintenance   Topic Date Due   • TDAP/TD VACCINES (1 - Tdap) Never done   • ZOSTER VACCINE (2 of 3) 01/02/2023 (Originally 4/15/2013)   • INFLUENZA VACCINE  10/01/2022   • LIPID PANEL  09/08/2023              Assessment & Plan   CMS Preventative Services Quick Reference  Risk Factors Identified During Encounter  Immunizations Discussed/Encouraged (specific Immunizations; Tdap, Influenza and COVID19  Obesity/Overweight   Tobacco Use/Dependance (use dotphrase .tobaccocessation for documentation)  The above risks/problems have been discussed with the patient.  Follow up actions/plans if indicated are seen below in the Assessment/Plan Section.  Pertinent information has been shared with the patient in the After Visit Summary.    Diagnoses and all orders for this visit:    1. Medicare annual wellness visit, subsequent (Primary)  Comments:  Continue with healthy diet.  Information shared.  Information on advanced directive shared.  Stop smoking information shared.    2. Seasonal allergies  -     montelukast (SINGULAIR) 10 MG tablet; Take 1 tablet by mouth Every Evening.  Dispense: 90 tablet; Refill: 3    3. Hypothyroidism, acquired  -     levothyroxine (SYNTHROID, LEVOTHROID) 175 MCG tablet; Take 1 tablet by mouth Daily.  Dispense: 90 tablet; Refill: 3  -     TSH+Free T4; Future    4. Encounter for screening for malignant neoplasm of colon  -     Cologuard - Stool, Per Rectum; Future    5. Need for hepatitis C screening test  -     Hepatitis C Antibody; Future    6. Cigarette smoker  Assessment & Plan:  As stated in the AVS, patient has  history of tobacco use and has been encouraged to quit. He is willing to quit. Please refer to the HPI for specifics.   Information shared in AVS on tobacco cessation.       7. Stage 3a chronic kidney disease (HCC)  Assessment & Plan:  Renal condition is unchanged.  Continue current treatment regimen.  Renal condition will be reassessed in 1 year. Encouraged 6-8 glasses of water daily.       8. Class 2 severe obesity due to excess calories with serious comorbidity and body mass index (BMI) of 35.0 to 35.9 in adult (HCC)  Assessment & Plan:  Patient's (Body mass index is 35.55 kg/m².) indicates that they are morbidly obese (BMI > 40 or > 35 with obesity - related health condition) with health conditions that include hypertension and dyslipidemias . Weight is improving with lifestyle modifications. BMI is is above average; BMI management plan is completed. We discussed portion control, increasing exercise and Weight Watchers or other Commercial based weight reduction program.         Follow Up:   Return in about 2 months (around 11/12/2022) for recheck of current condition (thyroid control).     An After Visit Summary and PPPS were made available to the patient.

## 2022-09-12 NOTE — PATIENT INSTRUCTIONS
For more information:    Quit Now [place]  1-800-QUIT-NOW  https://[place].quitlogix.org/en-[place]/  Steps to Quit Smoking  Smoking tobacco can be harmful to your health and can affect almost every organ in your body. Smoking puts you, and those around you, at risk for developing many serious chronic diseases. Quitting smoking is difficult, but it is one of the best things that you can do for your health. It is never too late to quit.  What are the benefits of quitting smoking?  When you quit smoking, you lower your risk of developing serious diseases and conditions, such as:  Lung cancer or lung disease, such as COPD.  Heart disease.  Stroke.  Heart attack.  Infertility.  Osteoporosis and bone fractures.  Additionally, symptoms such as coughing, wheezing, and shortness of breath may get better when you quit. You may also find that you get sick less often because your body is stronger at fighting off colds and infections. If you are pregnant, quitting smoking can help to reduce your chances of having a baby of low birth weight.  How do I get ready to quit?  When you decide to quit smoking, create a plan to make sure that you are successful. Before you quit:  Pick a date to quit. Set a date within the next two weeks to give you time to prepare.  Write down the reasons why you are quitting. Keep this list in places where you will see it often, such as on your bathroom mirror or in your car or wallet.  Identify the people, places, things, and activities that make you want to smoke (triggers) and avoid them. Make sure to take these actions:  Throw away all cigarettes at home, at work, and in your car.  Throw away smoking accessories, such as ashtrays and lighters.  Clean your car and make sure to empty the ashtray.  Clean your home, including curtains and carpets.  Tell your family, friends, and coworkers that you are quitting. Support from your loved ones can make quitting easier.  Talk with your health care provider  about your options for quitting smoking.  Find out what treatment options are covered by your health insurance.  What strategies can I use to quit smoking?  Talk with your healthcare provider about different strategies to quit smoking. Some strategies include:  Quitting smoking altogether instead of gradually lessening how much you smoke over a period of time. Research shows that quitting “cold turkey” is more successful than gradually quitting.  Attending in-person counseling to help you build problem-solving skills. You are more likely to have success in quitting if you attend several counseling sessions. Even short sessions of 10 minutes can be effective.  Finding resources and support systems that can help you to quit smoking and remain smoke-free after you quit. These resources are most helpful when you use them often. They can include:  Online chats with a counselor.  Telephone quitlines.  Printed self-help materials.  Support groups or group counseling.  Text messaging programs.  Mobile phone applications.  Taking medicines to help you quit smoking. (If you are pregnant or breastfeeding, talk with your health care provider first.) Some medicines contain nicotine and some do not. Both types of medicines help with cravings, but the medicines that include nicotine help to relieve withdrawal symptoms. Your health care provider may recommend:  Nicotine patches, gum, or lozenges.  Nicotine inhalers or sprays.  Non-nicotine medicine that is taken by mouth.  Talk with your health care provider about combining strategies, such as taking medicines while you are also receiving in-person counseling. Using these two strategies together makes you more likely to succeed in quitting than if you used either strategy on its own.  If you are pregnant or breastfeeding, talk with your health care provider about finding counseling or other support strategies to quit smoking. Do not take medicine to help you quit smoking unless  told to do so by your health care provider.  What things can I do to make it easier to quit?  Quitting smoking might feel overwhelming at first, but there is a lot that you can do to make it easier. Take these important actions:  Reach out to your family and friends and ask that they support and encourage you during this time. Call telephone quitlines, reach out to support groups, or work with a counselor for support.  Ask people who smoke to avoid smoking around you.  Avoid places that trigger you to smoke, such as bars, parties, or smoke-break areas at work.  Spend time around people who do not smoke.  Lessen stress in your life, because stress can be a smoking trigger for some people. To lessen stress, try:  Exercising regularly.  Deep-breathing exercises.  Yoga.  Meditating.  Performing a body scan. This involves closing your eyes, scanning your body from head to toe, and noticing which parts of your body are particularly tense. Purposefully relax the muscles in those areas.  Download or purchase mobile phone or tablet apps (applications) that can help you stick to your quit plan by providing reminders, tips, and encouragement. There are many free apps, such as QuitGuide from the CDC (Centers for Disease Control and Prevention). You can find other support for quitting smoking (smoking cessation) through smokefree.gov and other websites.  How will I feel when I quit smoking?  Within the first 24 hours of quitting smoking, you may start to feel some withdrawal symptoms. These symptoms are usually most noticeable 2-3 days after quitting, but they usually do not last beyond 2-3 weeks. Changes or symptoms that you might experience include:  Mood swings.  Restlessness, anxiety, or irritation.  Difficulty concentrating.  Dizziness.  Strong cravings for sugary foods in addition to nicotine.  Mild weight gain.  Constipation.  Nausea.  Coughing or a sore throat.  Changes in how your medicines work in your body.  A  depressed mood.  Difficulty sleeping (insomnia).  After the first 2-3 weeks of quitting, you may start to notice more positive results, such as:  Improved sense of smell and taste.  Decreased coughing and sore throat.  Slower heart rate.  Lower blood pressure.  Clearer skin.  The ability to breathe more easily.  Fewer sick days.  Quitting smoking is very challenging for most people. Do not get discouraged if you are not successful the first time. Some people need to make many attempts to quit before they achieve long-term success. Do your best to stick to your quit plan, and talk with your health care provider if you have any questions or concerns.  This information is not intended to replace advice given to you by your health care provider. Make sure you discuss any questions you have with your health care provider.  Document Released: 12/12/2002 Document Revised: 08/15/2017 Document Reviewed: 05/03/2016  [employer] Patient Education © 2017 [employer]      Advance Care Planning and Advance Directives     You make decisions on a daily basis - decisions about where you want to live, your career, your home, your life. Perhaps one of the most important decisions you face is your choice for future medical care. Take time to talk with your family and your healthcare team and start planning today.  Advance Care Planning is a process that can help you:  Understand possible future healthcare decisions in light of your own experiences  Reflect on those decision in light of your goals and values  Discuss your decisions with those closest to you and the healthcare professionals that care for you  Make a plan by creating a document that reflects your wishes    Surrogate Decision Maker  In the event of a medical emergency, which has left you unable to communicate or to make your own decisions, you would need someone to make decisions for you.  It is important to discuss your preferences for medical treatment with  this person while you are in good health.     Qualities of a surrogate decision maker:  Willing to take on this role and responsibility  Knows what you want for future medical care  Willing to follow your wishes even if they don't agree with them  Able to make difficult medical decisions under stressful circumstances    Advance Directives  These are legal documents you can create that will guide your healthcare team and decision maker(s) when needed. These documents can be stored in the electronic medical record.    Living Will - a legal document to guide your care if you have a terminal condition or a serious illness and are unable to communicate. States vary by statute in document names/types, but most forms may include one or more of the following:        -  Directions regarding life-prolonging treatments        -  Directions regarding artificially provided nutrition/hydration        -  Choosing a healthcare decision maker        -  Direction regarding organ/tissue donation    Durable Power of  for Healthcare - this document names an -in-fact to make medical decisions for you, but it may also allow this person to make personal and financial decisions for you. Please seek the advice of an  if you need this type of document.    **Advance Directives are not required and no one may discriminate against you if you do not sign one.    Medical Orders  Many states allow specific forms/orders signed by your physician to record your wishes for medical treatment in your current state of health. This form, signed in personal communication with your physician, addresses resuscitation and other medical interventions that you may or may not want.      For more information or to schedule a time with a [facility] Advance Care Planning Facilitator contact: [facility]/ACP or call [phone] and someone will contact you directly.    Medicare Wellness  Personal Prevention Plan of Service     Date  of Office Visit:    Encounter Provider:  Lane Puri MD  Place of Service:  [facility]  Patient Name: Tony Max  :  1950    As part of the Medicare Wellness portion of your visit today, we are providing you with this personalized preventive plan of services (PPPS). This plan is based upon recommendations of the [place] Preventive Services Task Force (USPSTF) and the Advisory Committee on Immunization Practices (ACIP).    This lists the preventive care services that should be considered, and provides dates of when you are due. Items listed as completed are up-to-date and do not require any further intervention.    Health Maintenance   Topic Date Due    COLORECTAL CANCER SCREENING  2006    ZOSTER VACCINE (2 of 3) 2023 (Originally 4/15/2013)    INFLUENZA VACCINE  10/01/2022    LIPID PANEL  2023    ANNUAL WELLNESS VISIT  2023    Pneumococcal Vaccine 65+ (2 - PPSV23 or PCV20) 10/25/2023    COVID-19 Vaccine  Completed    AAA SCREEN (ONE-TIME)  Completed    HEPATITIS C SCREENING  Discontinued    TDAP/TD VACCINES  Discontinued       Orders Placed This Encounter   Procedures    TSH+Free T4     Standing Status:   Future     Standing Expiration Date:   2023    Cologuard - Stool, Per Rectum     Standing Status:   Future     Standing Expiration Date:   2023     Order Specific Question:   Release to patient     Answer:   Routine Release       No follow-ups on file.      Check on insurance coverage and cost for adacel Tdap(tetanus plus whooping cough vaccine) and get the immunization at your local pharmacy. (Once every 10 Years)

## 2022-09-12 NOTE — ASSESSMENT & PLAN NOTE
As stated in the AVS, patient has history of tobacco use and has been encouraged to quit. He is willing to quit. Please refer to the HPI for specifics.   Information shared in AVS on tobacco cessation.

## 2022-09-12 NOTE — ASSESSMENT & PLAN NOTE
Renal condition is unchanged.  Continue current treatment regimen.  Renal condition will be reassessed in 1 year. Encouraged 6-8 glasses of water daily.

## 2022-09-22 PROBLEM — F17.210 CIGARETTE SMOKER: Status: ACTIVE | Noted: 2022-09-12

## 2022-09-26 ENCOUNTER — TELEPHONE (OUTPATIENT)
Dept: FAMILY MEDICINE CLINIC | Facility: CLINIC | Age: 72
End: 2022-09-26

## 2022-09-26 DIAGNOSIS — Z12.11 SCREEN FOR COLON CANCER: Primary | ICD-10-CM

## 2022-09-26 NOTE — TELEPHONE ENCOUNTER
----- Message from Lane Puri MD sent at 9/26/2022  7:50 AM EDT -----  Call patient with results. Set him up to see GI for colonoscopy on urgent basis. Thanks, Dr. Puri  Try Dr. Yip's office first.

## 2022-09-26 NOTE — TELEPHONE ENCOUNTER
Spoke with pt regarding result he said he does have a hemorrhoids and bleeds from time to time but will do the GI referral.

## 2022-09-27 ENCOUNTER — TRANSCRIBE ORDERS (OUTPATIENT)
Dept: ADMINISTRATIVE | Facility: HOSPITAL | Age: 72
End: 2022-09-27

## 2022-09-27 DIAGNOSIS — R97.20 ELEVATED PROSTATE SPECIFIC ANTIGEN (PSA): Primary | ICD-10-CM

## 2022-10-31 ENCOUNTER — HOSPITAL ENCOUNTER (OUTPATIENT)
Dept: MRI IMAGING | Facility: HOSPITAL | Age: 72
Discharge: HOME | End: 2022-10-31
Admitting: UROLOGY

## 2022-10-31 DIAGNOSIS — R97.20 ELEVATED PROSTATE SPECIFIC ANTIGEN (PSA): ICD-10-CM

## 2022-10-31 LAB — CREAT BLDA-MCNC: 1.1 MG/DL (ref 0.6–1.3)

## 2022-10-31 PROCEDURE — 82565 ASSAY OF CREATININE: CPT

## 2022-10-31 PROCEDURE — 72197 MRI PELVIS W/O & W/DYE: CPT

## 2022-10-31 PROCEDURE — 0 GADOBENATE DIMEGLUMINE 529 MG/ML SOLUTION: Performed by: UROLOGY

## 2022-10-31 PROCEDURE — A9577 INJ MULTIHANCE: HCPCS | Performed by: UROLOGY

## 2022-10-31 RX ADMIN — GADOBENATE DIMEGLUMINE 20 ML: 529 INJECTION, SOLUTION INTRAVENOUS at 16:28

## 2022-11-02 ENCOUNTER — TELEPHONE (OUTPATIENT)
Dept: FAMILY MEDICINE CLINIC | Facility: CLINIC | Age: 72
End: 2022-11-02

## 2022-11-02 NOTE — TELEPHONE ENCOUNTER
Called patient.  He finally got paperwork from gastroenterology and sent it in Wednesday.  He is awaiting the appointment for definitive colonoscopy due to positive Cologuard.  I told him to reach out to us if he has any problems with getting this scheduled.   [

## 2022-11-21 ENCOUNTER — OFFICE VISIT (OUTPATIENT)
Dept: FAMILY MEDICINE CLINIC | Facility: CLINIC | Age: 72
End: 2022-11-21

## 2022-11-21 VITALS
TEMPERATURE: 98.7 F | HEIGHT: 74 IN | DIASTOLIC BLOOD PRESSURE: 96 MMHG | OXYGEN SATURATION: 98 % | HEART RATE: 106 BPM | BODY MASS INDEX: 35.55 KG/M2 | WEIGHT: 277 LBS | RESPIRATION RATE: 18 BRPM | SYSTOLIC BLOOD PRESSURE: 142 MMHG

## 2022-11-21 DIAGNOSIS — E03.9 HYPOTHYROIDISM, ACQUIRED: Primary | ICD-10-CM

## 2022-11-21 PROBLEM — N52.9 ERECTILE DYSFUNCTION: Status: ACTIVE | Noted: 2022-11-11

## 2022-11-21 PROCEDURE — 99213 OFFICE O/P EST LOW 20 MIN: CPT | Performed by: FAMILY MEDICINE

## 2022-11-21 RX ORDER — LORAZEPAM 0.5 MG/1
0.5 TABLET ORAL 3 TIMES DAILY
COMMUNITY

## 2022-11-21 RX ORDER — TADALAFIL 5 MG/1
TABLET ORAL
COMMUNITY
Start: 2022-11-11

## 2022-11-21 RX ORDER — LEVOTHYROXINE SODIUM 0.15 MG/1
150 TABLET ORAL DAILY
Qty: 90 TABLET | Refills: 0 | Status: SHIPPED | OUTPATIENT
Start: 2022-11-21 | End: 2023-01-17 | Stop reason: SDUPTHER

## 2022-11-21 NOTE — ASSESSMENT & PLAN NOTE
Further adjustment of levothyroxine needed.  Decrease dose to 150 mcg a.m. dosing on empty stomach.  Recheck labs 2 months towards the end of January.

## 2022-12-22 ENCOUNTER — PRE-PROCEDURE SCREENING (OUTPATIENT)
Dept: GASTROENTEROLOGY | Facility: CLINIC | Age: 72
End: 2022-12-22

## 2022-12-22 NOTE — TELEPHONE ENCOUNTER
LAST SCOPE 2006 ( NO RECORDS) --No personal history of polyps--No family history of polyps or colon cancer--ELIQUIS ASPIRIN--Medications:                apixaban (ELIQUIS) 5 MG tablet tablet  aspirin 81 MG chewable tablet  benzonatate (TESSALON) 200 MG capsule  busPIRone (BUSPAR) 15 MG tablet  finasteride (PROSCAR) 5 MG tablet  levothyroxine (SYNTHROID, LEVOTHROID) 150 MCG tablet  LORazepam (ATIVAN) 0.5 MG tablet  metoprolol succinate XL (TOPROL-XL) 25 MG 24 hr tablet  montelukast (SINGULAIR) 10 MG tablet  sertraline (ZOLOFT) 50 MG tablet  simvastatin (ZOCOR) 20 MG tablet  tadalafil (CIALIS) 5 MG tablet            QUESTIONNAIRE SCEEENING & HAS BEEN SENT TO DOCTOR FOR REVIEW

## 2023-01-05 ENCOUNTER — PREP FOR SURGERY (OUTPATIENT)
Dept: OTHER | Facility: HOSPITAL | Age: 73
End: 2023-01-05
Payer: MEDICARE

## 2023-01-05 DIAGNOSIS — Z12.11 ENCOUNTER FOR SCREENING FOR MALIGNANT NEOPLASM OF COLON: Primary | ICD-10-CM

## 2023-01-05 RX ORDER — SODIUM CHLORIDE, SODIUM LACTATE, POTASSIUM CHLORIDE, CALCIUM CHLORIDE 600; 310; 30; 20 MG/100ML; MG/100ML; MG/100ML; MG/100ML
30 INJECTION, SOLUTION INTRAVENOUS CONTINUOUS
Status: CANCELLED | OUTPATIENT
Start: 2023-04-13

## 2023-01-17 ENCOUNTER — TELEPHONE (OUTPATIENT)
Dept: GASTROENTEROLOGY | Facility: CLINIC | Age: 73
End: 2023-01-17
Payer: MEDICARE

## 2023-01-17 DIAGNOSIS — E03.9 HYPOTHYROIDISM, ACQUIRED: ICD-10-CM

## 2023-01-17 RX ORDER — LEVOTHYROXINE SODIUM 0.15 MG/1
150 TABLET ORAL DAILY
Qty: 90 TABLET | Refills: 3 | Status: SHIPPED | OUTPATIENT
Start: 2023-01-17 | End: 2024-01-17

## 2023-01-17 NOTE — TELEPHONE ENCOUNTER
Called pt and advised pr Dr No it is ok to hold Eliquis 48hours prior to colonoscopy.  Clearance scanned under media.

## 2023-02-03 ENCOUNTER — TELEPHONE (OUTPATIENT)
Dept: GASTROENTEROLOGY | Facility: CLINIC | Age: 73
End: 2023-02-03
Payer: MEDICARE

## 2023-02-03 PROBLEM — Z12.11 ENCOUNTER FOR SCREENING FOR MALIGNANT NEOPLASM OF COLON: Status: ACTIVE | Noted: 2023-02-03

## 2023-02-03 NOTE — TELEPHONE ENCOUNTER
OK FOR HUB TO READ  AYO patient via telephone for COLONOSCOPY. Scheduled 4/13/23 with arrival time of 0800AM. Prep paperwork mailed to verified address on file. Patient advised arrival time may change based on [facility] guidelines. AYO SANCHEZ

## 2023-04-07 ENCOUNTER — TELEPHONE (OUTPATIENT)
Dept: GASTROENTEROLOGY | Facility: CLINIC | Age: 73
End: 2023-04-07

## 2023-04-07 NOTE — TELEPHONE ENCOUNTER
Caller: Tony Max    Relationship to patient: Self    Best call back number: [phone]    Patient is needing: PT CALLED NEEDING TO RESCHEDULE THEIR COLONOSCOPY PROCEDURE. ANYTIME IS GOOD TO CALL, VM IS OKAY - PLEASE CALL BACK AND ADVISE

## 2023-05-17 NOTE — PROGRESS NOTES
"Subjective   Tony Max is a 72 y.o. male.     CC: \"Numbness and Tingling\"    History of Present Illness     Patient of Dr. Puri's comes in to see me today reporting an approximately 6+ month h/o slowly worsening tingling of the bottoms of the feet. No back pain, no change to activity, and no other sx reported.       The following portions of the patient's history were reviewed and updated as appropriate: allergies, current medications, past family history, past medical history, past social history, past surgical history and problem list.    Review of Systems   Constitutional: Negative for activity change, chills and fever.   Respiratory: Negative for cough.    Cardiovascular: Negative for chest pain.   Neurological: Positive for numbness.   Psychiatric/Behavioral: Negative for dysphoric mood.       /88   Pulse 82   Temp 97.5 °F (36.4 °C) (Oral)   Resp 16   Ht 188 cm (74\")   Wt 128 kg (283 lb)   BMI 36.34 kg/m²     Objective   Physical Exam  Constitutional:       General: He is not in acute distress.     Appearance: He is well-developed.   Cardiovascular:      Rate and Rhythm: Normal rate. Rhythm irregular.   Pulmonary:      Effort: Pulmonary effort is normal.      Breath sounds: Normal breath sounds.   Neurological:      Mental Status: He is alert and oriented to person, place, and time.   Psychiatric:         Behavior: Behavior normal.         Thought Content: Thought content normal.         Assessment & Plan   Diagnoses and all orders for this visit:    1. Neuropathy (Primary)  -     Vitamin B12  -     Folate    2. Benign essential hypertension  -     Comprehensive Metabolic Panel  -     CBC & Differential    3. Hypothyroidism, acquired  -     TSH  -     T4, Free  -     T3               "

## 2023-05-18 ENCOUNTER — OFFICE VISIT (OUTPATIENT)
Dept: FAMILY MEDICINE CLINIC | Facility: CLINIC | Age: 73
End: 2023-05-18
Payer: MEDICARE

## 2023-05-18 VITALS
SYSTOLIC BLOOD PRESSURE: 128 MMHG | BODY MASS INDEX: 36.32 KG/M2 | RESPIRATION RATE: 16 BRPM | HEART RATE: 82 BPM | TEMPERATURE: 97.5 F | DIASTOLIC BLOOD PRESSURE: 88 MMHG | WEIGHT: 283 LBS | HEIGHT: 74 IN

## 2023-05-18 DIAGNOSIS — I10 BENIGN ESSENTIAL HYPERTENSION: ICD-10-CM

## 2023-05-18 DIAGNOSIS — E03.9 HYPOTHYROIDISM, ACQUIRED: ICD-10-CM

## 2023-05-18 DIAGNOSIS — G62.9 NEUROPATHY: Primary | ICD-10-CM

## 2023-05-18 PROCEDURE — 3079F DIAST BP 80-89 MM HG: CPT | Performed by: FAMILY MEDICINE

## 2023-05-18 PROCEDURE — 1160F RVW MEDS BY RX/DR IN RCRD: CPT | Performed by: FAMILY MEDICINE

## 2023-05-18 PROCEDURE — 99214 OFFICE O/P EST MOD 30 MIN: CPT | Performed by: FAMILY MEDICINE

## 2023-05-18 PROCEDURE — 1159F MED LIST DOCD IN RCRD: CPT | Performed by: FAMILY MEDICINE

## 2023-05-18 PROCEDURE — 3074F SYST BP LT 130 MM HG: CPT | Performed by: FAMILY MEDICINE

## 2023-05-19 LAB
ALBUMIN SERPL-MCNC: 4.5 G/DL (ref 3.5–5.2)
ALBUMIN/GLOB SERPL: 2.1 G/DL
ALP SERPL-CCNC: 108 U/L (ref 39–117)
ALT SERPL-CCNC: 27 U/L (ref 1–41)
AST SERPL-CCNC: 21 U/L (ref 1–40)
BASOPHILS # BLD AUTO: 0.01 10*3/MM3 (ref 0–0.2)
BASOPHILS NFR BLD AUTO: 0.1 % (ref 0–1.5)
BILIRUB SERPL-MCNC: 4.6 MG/DL (ref 0–1.2)
BUN SERPL-MCNC: 18 MG/DL (ref 8–23)
BUN/CREAT SERPL: 14.1 (ref 7–25)
CALCIUM SERPL-MCNC: 9.5 MG/DL (ref 8.6–10.5)
CHLORIDE SERPL-SCNC: 107 MMOL/L (ref 98–107)
CO2 SERPL-SCNC: 25.7 MMOL/L (ref 22–29)
CREAT SERPL-MCNC: 1.28 MG/DL (ref 0.76–1.27)
EGFRCR SERPLBLD CKD-EPI 2021: 59.1 ML/MIN/1.73
EOSINOPHIL # BLD AUTO: 0.03 10*3/MM3 (ref 0–0.4)
EOSINOPHIL NFR BLD AUTO: 0.4 % (ref 0.3–6.2)
ERYTHROCYTE [DISTWIDTH] IN BLOOD BY AUTOMATED COUNT: 13 % (ref 12.3–15.4)
FOLATE SERPL-MCNC: 2.19 NG/ML (ref 4.78–24.2)
GLOBULIN SER CALC-MCNC: 2.1 GM/DL
GLUCOSE SERPL-MCNC: 111 MG/DL (ref 65–99)
HCT VFR BLD AUTO: 48.8 % (ref 37.5–51)
HGB BLD-MCNC: 16.8 G/DL (ref 13–17.7)
IMM GRANULOCYTES # BLD AUTO: 0.02 10*3/MM3 (ref 0–0.05)
IMM GRANULOCYTES NFR BLD AUTO: 0.2 % (ref 0–0.5)
LYMPHOCYTES # BLD AUTO: 3.33 10*3/MM3 (ref 0.7–3.1)
LYMPHOCYTES NFR BLD AUTO: 40.7 % (ref 19.6–45.3)
MCH RBC QN AUTO: 34.1 PG (ref 26.6–33)
MCHC RBC AUTO-ENTMCNC: 34.4 G/DL (ref 31.5–35.7)
MCV RBC AUTO: 99 FL (ref 79–97)
MONOCYTES # BLD AUTO: 0.82 10*3/MM3 (ref 0.1–0.9)
MONOCYTES NFR BLD AUTO: 10 % (ref 5–12)
NEUTROPHILS # BLD AUTO: 3.98 10*3/MM3 (ref 1.7–7)
NEUTROPHILS NFR BLD AUTO: 48.6 % (ref 42.7–76)
NRBC BLD AUTO-RTO: 0.1 /100 WBC (ref 0–0.2)
PLATELET # BLD AUTO: 147 10*3/MM3 (ref 140–450)
POTASSIUM SERPL-SCNC: 4.2 MMOL/L (ref 3.5–5.2)
PROT SERPL-MCNC: 6.6 G/DL (ref 6–8.5)
RBC # BLD AUTO: 4.93 10*6/MM3 (ref 4.14–5.8)
SODIUM SERPL-SCNC: 143 MMOL/L (ref 136–145)
T3 SERPL-MCNC: 85.8 NG/DL (ref 80–200)
T4 FREE SERPL-MCNC: 1.46 NG/DL (ref 0.93–1.7)
TSH SERPL DL<=0.005 MIU/L-ACNC: 1.74 UIU/ML (ref 0.27–4.2)
VIT B12 SERPL-MCNC: 195 PG/ML (ref 211–946)
WBC # BLD AUTO: 8.19 10*3/MM3 (ref 3.4–10.8)

## 2023-05-19 RX ORDER — FOLIC ACID 1 MG/1
1 TABLET ORAL DAILY
Qty: 90 TABLET | Refills: 3 | Status: SHIPPED | OUTPATIENT
Start: 2023-05-19

## 2023-05-23 ENCOUNTER — CLINICAL SUPPORT (OUTPATIENT)
Dept: FAMILY MEDICINE CLINIC | Facility: CLINIC | Age: 73
End: 2023-05-23
Payer: MEDICARE

## 2023-05-23 DIAGNOSIS — E53.8 B12 DEFICIENCY: ICD-10-CM

## 2023-05-23 DIAGNOSIS — Z23 IMMUNIZATION DUE: Primary | ICD-10-CM

## 2023-05-23 RX ORDER — CYANOCOBALAMIN 1000 UG/ML
1000 INJECTION, SOLUTION INTRAMUSCULAR; SUBCUTANEOUS
Status: SHIPPED | OUTPATIENT
Start: 2023-05-23

## 2023-05-23 RX ADMIN — CYANOCOBALAMIN 1000 MCG: 1000 INJECTION, SOLUTION INTRAMUSCULAR; SUBCUTANEOUS at 11:42

## 2023-05-23 NOTE — PROGRESS NOTES
Injection  Injection performed in LD by Alejandra Echevarria MA. Patient tolerated the procedure well without complications.  05/23/23   Alejandra Echevarria MA

## 2023-05-25 ENCOUNTER — TELEPHONE (OUTPATIENT)
Dept: GASTROENTEROLOGY | Facility: CLINIC | Age: 73
End: 2023-05-25
Payer: MEDICARE

## 2023-05-26 ENCOUNTER — TELEPHONE (OUTPATIENT)
Dept: GASTROENTEROLOGY | Facility: CLINIC | Age: 73
End: 2023-05-26

## 2023-05-26 ENCOUNTER — TELEPHONE (OUTPATIENT)
Dept: GASTROENTEROLOGY | Facility: CLINIC | Age: 73
End: 2023-05-26
Payer: MEDICARE

## 2023-05-26 NOTE — TELEPHONE ENCOUNTER
Hub staff attempted to follow warm transfer process and was unsuccessful     Caller: Tony Max    Relationship to patient: Self    Best call back number: [phone]    Patient is needing: PT IS CALLING TO CANCEL SCOPE 05.30.23 @10AM. PT IS CONCERNED BECAUSE NO ONE HAS CALLED HIM BACK TO CONFIRM PROC IS CANCELED. HUB UNABLE TO WARM TRANSFER. PLEASE ENSURE SOMEONE CANCELS THE SCOPE AND CALLS THE PT TO CONFIRM. YOU CAN LVM

## 2023-05-26 NOTE — TELEPHONE ENCOUNTER
Hub staff attempted to follow warm transfer process and was unsuccessful     Caller: LEONARDO GREEN    Relationship to patient: SELF    Best call back number: [phone]    Patient is needing: PATIENT CALLED NEEDING TO CANCEL COLONOSCOPY SCHEDULED FOR 05/30/23. PLEASE CALL BACK .[phone]. OK TO LEAVE VOICEMAIL

## 2023-05-26 NOTE — TELEPHONE ENCOUNTER
CALLER: Tony Max    RELATIONSHIP TO PATIENT: Self    BEST CALL BACK NUMBER: [phone]    CALL REGARDING: Reschedule Colonoscopy that is schedule for Tuesday 5/30/2023 with Dr. Hassan     Patient Request a Call Back

## 2023-05-30 ENCOUNTER — HOSPITAL ENCOUNTER (OUTPATIENT)
Facility: HOSPITAL | Age: 73
Setting detail: HOSPITAL OUTPATIENT SURGERY
Discharge: HOME | End: 2023-05-30
Attending: INTERNAL MEDICINE | Admitting: INTERNAL MEDICINE

## 2023-05-30 ENCOUNTER — ANESTHESIA EVENT (OUTPATIENT)
Dept: GASTROENTEROLOGY | Facility: HOSPITAL | Age: 73
End: 2023-05-30

## 2023-05-30 ENCOUNTER — ANESTHESIA (OUTPATIENT)
Dept: GASTROENTEROLOGY | Facility: HOSPITAL | Age: 73
End: 2023-05-30

## 2023-05-30 VITALS
WEIGHT: 279 LBS | BODY MASS INDEX: 35.81 KG/M2 | RESPIRATION RATE: 17 BRPM | HEART RATE: 78 BPM | HEIGHT: 74 IN | SYSTOLIC BLOOD PRESSURE: 122 MMHG | OXYGEN SATURATION: 96 % | DIASTOLIC BLOOD PRESSURE: 78 MMHG

## 2023-05-30 DIAGNOSIS — Z12.11 ENCOUNTER FOR SCREENING FOR MALIGNANT NEOPLASM OF COLON: ICD-10-CM

## 2023-05-30 PROCEDURE — 88305 TISSUE EXAM BY PATHOLOGIST: CPT | Performed by: INTERNAL MEDICINE

## 2023-05-30 PROCEDURE — 25010000002 PROPOFOL 10 MG/ML EMULSION: Performed by: ANESTHESIOLOGY

## 2023-05-30 RX ORDER — PROPOFOL 10 MG/ML
VIAL (ML) INTRAVENOUS AS NEEDED
Status: DISCONTINUED | OUTPATIENT
Start: 2023-05-30 | End: 2023-05-30 | Stop reason: SURG

## 2023-05-30 RX ORDER — LIDOCAINE HYDROCHLORIDE 20 MG/ML
INJECTION, SOLUTION INFILTRATION; PERINEURAL AS NEEDED
Status: DISCONTINUED | OUTPATIENT
Start: 2023-05-30 | End: 2023-05-30 | Stop reason: SURG

## 2023-05-30 RX ORDER — SODIUM CHLORIDE, SODIUM LACTATE, POTASSIUM CHLORIDE, CALCIUM CHLORIDE 600; 310; 30; 20 MG/100ML; MG/100ML; MG/100ML; MG/100ML
1000 INJECTION, SOLUTION INTRAVENOUS CONTINUOUS
Status: DISCONTINUED | OUTPATIENT
Start: 2023-05-30 | End: 2023-05-30 | Stop reason: HOSPADM

## 2023-05-30 RX ADMIN — PROPOFOL 100 MCG/KG/MIN: 10 INJECTION, EMULSION INTRAVENOUS at 11:00

## 2023-05-30 RX ADMIN — PROPOFOL 100 MG: 10 INJECTION, EMULSION INTRAVENOUS at 10:59

## 2023-05-30 RX ADMIN — LIDOCAINE HYDROCHLORIDE 60 MG: 20 INJECTION, SOLUTION INFILTRATION; PERINEURAL at 10:59

## 2023-05-30 RX ADMIN — SODIUM CHLORIDE, POTASSIUM CHLORIDE, SODIUM LACTATE AND CALCIUM CHLORIDE 1000 ML: 600; 310; 30; 20 INJECTION, SOLUTION INTRAVENOUS at 10:48

## 2023-05-30 NOTE — ANESTHESIA POSTPROCEDURE EVALUATION
Patient: Tony Max    Procedure Summary     Date: 05/30/23 [facility]    Anesthesia Start: 1056 Anesthesia Stop: 1142    Procedure: COLONOSCOPY to cecum with hot/cold snare/biopsy polypectomies Diagnosis:       Encounter for screening for malignant neoplasm of colon      (Encounter for screening for malignant neoplasm of colon [Z12.11])    Surgeons: Eliot Hassan MD Provider: Sowmya Kenney MD    Anesthesia Type: MAC ASA Status: 3          Anesthesia Type: MAC    Vitals  Vitals Value Taken Time   /74 05/30/23 1140   Temp     Pulse 76 05/30/23 1140   Resp 19 05/30/23 1140   SpO2 93 % 05/30/23 1140           Post Anesthesia Care and Evaluation    Patient location during evaluation: bedside  Patient participation: complete - patient participated  Level of consciousness: awake  Pain management: adequate    Airway patency: patent  Anesthetic complications: No anesthetic complications    Cardiovascular status: acceptable  Respiratory status: acceptable  Hydration status: acceptable

## 2023-05-30 NOTE — DISCHARGE INSTRUCTIONS
For the next 24 hours patient needs to be with a responsible adult.    For 24 hours DO NOT drive, operate machinery, appliances, drink alcohol, make important decisions or sign legal documents.    Start with a light or bland diet if you are feeling sick to your stomach otherwise advance to regular diet as tolerated.    Follow recommendations on procedure report if provided by your doctor.    Call Dr Hassan for problems [phone]    Problems may include but not limited to: large amounts of bleeding, trouble breathing, repeated vomiting, severe unrelieved pain, fever or chills.     May restart Eliquis in 48 hours

## 2023-05-30 NOTE — ANESTHESIA PREPROCEDURE EVALUATION
Anesthesia Evaluation                  Airway   Mallampati: III  TM distance: >3 FB  Neck ROM: full  No difficulty expected and Large neck circumference  Dental - normal exam     Pulmonary - normal exam   (+) sleep apnea,   Cardiovascular - normal exam    PT is on anticoagulation therapy  Patient on routine beta blocker    (+) hypertension, past MI , dysrhythmias Atrial Fib, hyperlipidemia,       Neuro/Psych  (+) psychiatric history,    GI/Hepatic/Renal/Endo    (+) obesity,  GERD,  renal disease CRI, thyroid problem hypothyroidism    Musculoskeletal     Abdominal  - normal exam    Bowel sounds: normal.   Substance History      OB/GYN          Other      history of cancer                    Anesthesia Plan    ASA 3     MAC     intravenous induction     Anesthetic plan, risks, benefits, and alternatives have been provided, discussed and informed consent has been obtained with: patient.        CODE STATUS:

## 2023-05-30 NOTE — H&P
[facility]  Pre Procedure History & Physical    Chief Complaint:   Time for my colonoscopy    Subjective     HPI:   72 y.o. male presenting to endoscopy unit today for screening colonoscopy.    Past Medical History:   Past Medical History:   Diagnosis Date   • Acute sinus infection     RECENT ANTIBIOTIC   • Atrial fibrillation    • Benign essential hypertension    • Bladder tumor    • Cancer     bladder   • Depression    • JAIDA (generalized anxiety disorder)    • GERD (gastroesophageal reflux disease)    • Heart attack    • HLD (hyperlipidemia)    • Hypothyroidism, acquired    • Non-ST elevated myocardial infarction (non-STEMI) 05/15/2011   • Screening for glaucoma 2007    negative   • Sleep apnea     C-PAP       Family History:  Family History   Problem Relation Age of Onset   • Anxiety disorder Mother    • Depression Mother    • Diabetes Mother    • Heart disease Mother    • Hypertension Mother    • Depression Sister    • Thyroid disease Sister    • Esophageal cancer Sister    • Cancer Other         breast; lung; prostate   • Heart disease Other         uncle       Social History:   reports that he has been smoking cigarettes. He has a 60.00 pack-year smoking history. He has never used smokeless tobacco. He reports that he does not drink alcohol and does not use drugs.    Medications:   Facility-Administered Medications Prior to Admission   Medication Dose Route Frequency Provider Last Rate Last Admin   • cyanocobalamin injection 1,000 mcg  1,000 mcg Intramuscular Q28 Days Lane Puri MD   1,000 mcg at 05/23/23 1142     Medications Prior to Admission   Medication Sig Dispense Refill Last Dose   • busPIRone (BUSPAR) 15 MG tablet Take 1 tablet by mouth 4 (Four) Times a Day.   5/29/2023   • folic acid (FOLVITE) 1 MG tablet Take 1 tablet by mouth Daily. 90 tablet 3 5/29/2023   • levothyroxine (SYNTHROID, LEVOTHROID) 150 MCG tablet Take 1 tablet by mouth Daily. 90 tablet 3 5/29/2023   •  "LORazepam (ATIVAN) 0.5 MG tablet Take 1 tablet by mouth 3 (Three) Times a Day.   5/29/2023   • metoprolol succinate XL (TOPROL-XL) 25 MG 24 hr tablet Take 1 tablet by mouth Daily.   5/29/2023   • montelukast (SINGULAIR) 10 MG tablet Take 1 tablet by mouth Every Evening. 90 tablet 3 5/29/2023   • sertraline (ZOLOFT) 50 MG tablet Take 1 tablet by mouth Every Morning.   5/29/2023   • simvastatin (ZOCOR) 20 MG tablet Take 1 tablet by mouth Every Night.   5/29/2023   • apixaban (ELIQUIS) 5 MG tablet tablet Take 1 tablet by mouth 2 (Two) Times a Day. 60 tablet  5/26/2023   • aspirin 81 MG chewable tablet Chew 1 tablet Daily.      • finasteride (PROSCAR) 5 MG tablet Take 5 mg by mouth Daily.      • tadalafil (CIALIS) 5 MG tablet TAKE 1 TABLET BY MOUTH ONCE DAILY AS NEEDED FOR ERECTILE DYSFUNCTION (USE 1 HOUR PRIOR TO ACTIVITY. START WITH 1 TABLET (5 MG). MAY TRY 2 TABLETS IF NEEDED.          Allergies:  Keflex [cephalexin], Amoxicillin, Pristiq [desvenlafaxine], Prozac [fluoxetine], Viibryd [vilazodone hcl], and Zoloft [sertraline]      Objective     Blood pressure (!) 146/109, pulse 90, resp. rate 16, height 188 cm (74\"), weight 127 kg (279 lb), SpO2 96 %.  Physical Exam:   General: patient awake, alert and cooperative    Assessment & Plan     Diagnosis:  Encounter for screening for colon cancer    Anticipated Surgical Procedure:  Colonoscopy    The risks, benefits, and alternatives of this procedure have been discussed with the patient or the responsible party- the patient understands and agrees to proceed.                                                                "

## 2023-05-31 LAB
LAB AP CASE REPORT: NORMAL
PATH REPORT.FINAL DX SPEC: NORMAL
PATH REPORT.GROSS SPEC: NORMAL

## 2023-06-02 ENCOUNTER — CLINICAL SUPPORT (OUTPATIENT)
Dept: FAMILY MEDICINE CLINIC | Facility: CLINIC | Age: 73
End: 2023-06-02

## 2023-06-02 DIAGNOSIS — E53.8 B12 DEFICIENCY: ICD-10-CM

## 2023-06-02 PROCEDURE — 96372 THER/PROPH/DIAG INJ SC/IM: CPT | Performed by: FAMILY MEDICINE

## 2023-06-02 RX ADMIN — CYANOCOBALAMIN 1000 MCG: 1000 INJECTION, SOLUTION INTRAMUSCULAR; SUBCUTANEOUS at 11:21

## 2023-06-02 NOTE — PROGRESS NOTES
Injection  Injection performed in  Rd  by Azul Clements MA. Patient tolerated the procedure well without complications.  06/02/23   Azul Clements MA

## 2023-06-09 ENCOUNTER — CLINICAL SUPPORT (OUTPATIENT)
Dept: FAMILY MEDICINE CLINIC | Facility: CLINIC | Age: 73
End: 2023-06-09
Payer: MEDICARE

## 2023-06-09 DIAGNOSIS — E53.8 B12 DEFICIENCY: Primary | ICD-10-CM

## 2023-06-09 PROCEDURE — 96372 THER/PROPH/DIAG INJ SC/IM: CPT | Performed by: FAMILY MEDICINE

## 2023-06-09 RX ORDER — CYANOCOBALAMIN 1000 UG/ML
1000 INJECTION, SOLUTION INTRAMUSCULAR; SUBCUTANEOUS
Status: SHIPPED | OUTPATIENT
Start: 2023-06-09

## 2023-06-09 RX ADMIN — CYANOCOBALAMIN 1000 MCG: 1000 INJECTION, SOLUTION INTRAMUSCULAR; SUBCUTANEOUS at 13:48

## 2023-06-09 NOTE — PROGRESS NOTES
Injection  Injection performed in LD by Nathaly Barfield MA. Patient tolerated the procedure well without complications.  06/09/23   Nathaly Barfield MA

## 2023-06-16 ENCOUNTER — CLINICAL SUPPORT (OUTPATIENT)
Dept: FAMILY MEDICINE CLINIC | Facility: CLINIC | Age: 73
End: 2023-06-16
Payer: MEDICARE

## 2023-06-16 DIAGNOSIS — E53.8 B12 DEFICIENCY: Primary | ICD-10-CM

## 2023-06-16 PROCEDURE — 96372 THER/PROPH/DIAG INJ SC/IM: CPT | Performed by: FAMILY MEDICINE

## 2023-06-16 RX ADMIN — CYANOCOBALAMIN 1000 MCG: 1000 INJECTION, SOLUTION INTRAMUSCULAR; SUBCUTANEOUS at 13:44

## 2023-06-16 NOTE — PROGRESS NOTES
Injection  Injection performed in  rd  by Azul Clements MA. Patient tolerated the procedure well without complications.  06/16/23   Azul Clements MA

## 2023-08-04 ENCOUNTER — CLINICAL SUPPORT (OUTPATIENT)
Dept: FAMILY MEDICINE CLINIC | Facility: CLINIC | Age: 73
End: 2023-08-04
Payer: MEDICARE

## 2023-08-04 DIAGNOSIS — E53.8 VITAMIN B12 DEFICIENCY: Primary | ICD-10-CM

## 2023-08-04 PROCEDURE — 96372 THER/PROPH/DIAG INJ SC/IM: CPT | Performed by: FAMILY MEDICINE

## 2023-08-04 RX ADMIN — CYANOCOBALAMIN 1000 MCG: 1000 INJECTION, SOLUTION INTRAMUSCULAR; SUBCUTANEOUS at 10:22

## 2023-09-08 ENCOUNTER — CLINICAL SUPPORT (OUTPATIENT)
Dept: FAMILY MEDICINE CLINIC | Facility: CLINIC | Age: 73
End: 2023-09-08
Payer: MEDICARE

## 2023-09-08 DIAGNOSIS — E53.8 B12 DEFICIENCY: Primary | ICD-10-CM

## 2023-09-08 PROCEDURE — 96372 THER/PROPH/DIAG INJ SC/IM: CPT | Performed by: FAMILY MEDICINE

## 2023-09-08 RX ADMIN — CYANOCOBALAMIN 1000 MCG: 1000 INJECTION, SOLUTION INTRAMUSCULAR; SUBCUTANEOUS at 13:41

## 2023-09-08 NOTE — PROGRESS NOTES
Injection  Injection performed in LD by Shanell Meyer MA. Patient tolerated the procedure well without complications.  09/08/23   Shanell Meyer MA

## 2023-10-06 ENCOUNTER — CLINICAL SUPPORT (OUTPATIENT)
Dept: FAMILY MEDICINE CLINIC | Facility: CLINIC | Age: 73
End: 2023-10-06
Payer: MEDICARE

## 2023-10-06 DIAGNOSIS — E53.8 B12 DEFICIENCY: Primary | ICD-10-CM

## 2023-10-06 PROCEDURE — 96372 THER/PROPH/DIAG INJ SC/IM: CPT | Performed by: FAMILY MEDICINE

## 2023-10-06 RX ADMIN — CYANOCOBALAMIN 1000 MCG: 1000 INJECTION, SOLUTION INTRAMUSCULAR; SUBCUTANEOUS at 13:45

## 2023-10-06 NOTE — PROGRESS NOTES
Injection  Injection performed in rd by Azul Clements MA. Patient tolerated the procedure well without complications.  10/06/23   Azul Clements MA

## 2023-10-12 DIAGNOSIS — J30.2 SEASONAL ALLERGIES: ICD-10-CM

## 2023-10-12 RX ORDER — MONTELUKAST SODIUM 10 MG/1
10 TABLET ORAL EVERY EVENING
Qty: 90 TABLET | Refills: 0 | Status: SHIPPED | OUTPATIENT
Start: 2023-10-12

## 2023-10-12 NOTE — TELEPHONE ENCOUNTER
Rx Refill Note  Requested Prescriptions     Pending Prescriptions Disp Refills    montelukast (SINGULAIR) 10 MG tablet [Pharmacy Med Name: Montelukast Sodium 10 MG Oral Tablet] 90 tablet 0     Sig: TAKE 1 TABLET BY MOUTH ONCE DAILY IN THE EVENING      Last office visit with prescribing clinician: 11/21/2022   Last telemedicine visit with prescribing clinician: Visit date not found   Next office visit with prescribing clinician: Visit date not found

## 2023-11-09 ENCOUNTER — CLINICAL SUPPORT (OUTPATIENT)
Dept: FAMILY MEDICINE CLINIC | Facility: CLINIC | Age: 73
End: 2023-11-09
Payer: MEDICARE

## 2023-11-09 DIAGNOSIS — E53.8 VITAMIN B 12 DEFICIENCY: Primary | ICD-10-CM

## 2023-11-09 NOTE — PROGRESS NOTES
Injection  B12 Injection performed in left deltoid   by Estephanie Tineo MA. Patient tolerated the procedure well without complications.  11/09/23   Estephaine Tineo MA

## 2023-12-14 ENCOUNTER — CLINICAL SUPPORT (OUTPATIENT)
Dept: FAMILY MEDICINE CLINIC | Facility: CLINIC | Age: 73
End: 2023-12-14
Payer: MEDICARE

## 2023-12-14 DIAGNOSIS — E53.8 VITAMIN B12 DEFICIENCY: Primary | ICD-10-CM

## 2023-12-14 PROCEDURE — 96372 THER/PROPH/DIAG INJ SC/IM: CPT | Performed by: FAMILY MEDICINE

## 2023-12-14 RX ADMIN — CYANOCOBALAMIN 1000 MCG: 1000 INJECTION, SOLUTION INTRAMUSCULAR; SUBCUTANEOUS at 13:39

## 2023-12-14 NOTE — PROGRESS NOTES
Injection  Injection performed in right deltoid by Yuliana Bright. Patient tolerated the procedure well without complications.  12/14/23   Yuliana Bright

## 2024-01-07 DIAGNOSIS — J30.2 SEASONAL ALLERGIES: ICD-10-CM

## 2024-01-09 RX ORDER — MONTELUKAST SODIUM 10 MG/1
10 TABLET ORAL EVERY EVENING
Qty: 30 TABLET | Refills: 0 | Status: SHIPPED | OUTPATIENT
Start: 2024-01-09

## 2024-01-09 NOTE — TELEPHONE ENCOUNTER
Rx Refill Note  Requested Prescriptions     Pending Prescriptions Disp Refills    montelukast (SINGULAIR) 10 MG tablet [Pharmacy Med Name: Montelukast Sodium 10 MG Oral Tablet] 90 tablet 0     Sig: TAKE 1 TABLET BY MOUTH ONCE DAILY IN THE EVENING      Last office visit with prescribing clinician: 11/21/2022   Last telemedicine visit with prescribing clinician: Visit date not found   Next office visit with prescribing clinician: Visit date not found                         Would you like a call back once the refill request has been completed: [] Yes [] No    If the office needs to give you a call back, can they leave a voicemail: [] Yes [] No    Azul Clements MA  01/09/24, 12:30 EST

## 2024-01-18 ENCOUNTER — TELEPHONE (OUTPATIENT)
Dept: FAMILY MEDICINE CLINIC | Facility: CLINIC | Age: 74
End: 2024-01-18
Payer: MEDICARE

## 2024-01-18 DIAGNOSIS — J30.2 SEASONAL ALLERGIES: ICD-10-CM

## 2024-01-18 DIAGNOSIS — E03.9 HYPOTHYROIDISM, ACQUIRED: ICD-10-CM

## 2024-01-22 ENCOUNTER — TELEPHONE (OUTPATIENT)
Dept: FAMILY MEDICINE CLINIC | Facility: CLINIC | Age: 74
End: 2024-01-22

## 2024-01-22 NOTE — TELEPHONE ENCOUNTER
Hub staff attempted to follow warm transfer process and was unsuccessful     Caller: Tony Max    Relationship to patient: Self    Best call back number: [phone]    Patient is needing: PATIENT IS SCHEDULED AT 2:30 THIS AFTERNOON FOR A B-12 SHOT. HE ISNT FEELING WELL AND WOULD LIKE TO RESCHEDULE. PLEASE REACH OUT TO PATIENT.

## 2024-01-24 ENCOUNTER — OFFICE VISIT (OUTPATIENT)
Dept: FAMILY MEDICINE CLINIC | Facility: CLINIC | Age: 74
End: 2024-01-24
Payer: MEDICARE

## 2024-01-24 VITALS
RESPIRATION RATE: 16 BRPM | HEIGHT: 74 IN | DIASTOLIC BLOOD PRESSURE: 88 MMHG | SYSTOLIC BLOOD PRESSURE: 144 MMHG | WEIGHT: 292 LBS | HEART RATE: 93 BPM | BODY MASS INDEX: 37.47 KG/M2 | TEMPERATURE: 97.1 F | OXYGEN SATURATION: 97 %

## 2024-01-24 DIAGNOSIS — K62.89 RECTAL MASS: ICD-10-CM

## 2024-01-24 DIAGNOSIS — E03.9 HYPOTHYROIDISM, ACQUIRED: Primary | ICD-10-CM

## 2024-01-24 DIAGNOSIS — J30.2 SEASONAL ALLERGIES: ICD-10-CM

## 2024-01-24 DIAGNOSIS — N18.31 STAGE 3A CHRONIC KIDNEY DISEASE: ICD-10-CM

## 2024-01-24 DIAGNOSIS — R29.898 BILATERAL LEG WEAKNESS: ICD-10-CM

## 2024-01-24 DIAGNOSIS — E66.01 CLASS 2 SEVERE OBESITY DUE TO EXCESS CALORIES WITH SERIOUS COMORBIDITY AND BODY MASS INDEX (BMI) OF 37.0 TO 37.9 IN ADULT: ICD-10-CM

## 2024-01-24 DIAGNOSIS — E53.8 B12 DEFICIENCY: ICD-10-CM

## 2024-01-24 DIAGNOSIS — R73.9 ELEVATED BLOOD SUGAR: ICD-10-CM

## 2024-01-24 DIAGNOSIS — I10 BENIGN ESSENTIAL HYPERTENSION: ICD-10-CM

## 2024-01-24 DIAGNOSIS — E78.49 OTHER HYPERLIPIDEMIA: ICD-10-CM

## 2024-01-24 RX ORDER — FOLIC ACID 1 MG/1
1 TABLET ORAL DAILY
Qty: 90 TABLET | Refills: 3 | Status: SHIPPED | OUTPATIENT
Start: 2024-01-24

## 2024-01-24 RX ORDER — BUSPIRONE HYDROCHLORIDE 10 MG/1
10 TABLET ORAL EVERY 12 HOURS SCHEDULED
COMMUNITY
Start: 2024-01-23

## 2024-01-24 RX ORDER — LORAZEPAM 1 MG/1
1 TABLET ORAL 3 TIMES DAILY
COMMUNITY
Start: 2024-01-15

## 2024-01-24 RX ORDER — MONTELUKAST SODIUM 10 MG/1
10 TABLET ORAL EVERY EVENING
Qty: 30 TABLET | Refills: 0 | Status: SHIPPED | OUTPATIENT
Start: 2024-01-24 | End: 2024-01-24 | Stop reason: SDUPTHER

## 2024-01-24 RX ORDER — MONTELUKAST SODIUM 10 MG/1
10 TABLET ORAL EVERY EVENING
Qty: 90 TABLET | Refills: 3 | Status: SHIPPED | OUTPATIENT
Start: 2024-01-24 | End: 2025-01-23

## 2024-01-24 RX ORDER — LEVOTHYROXINE SODIUM 0.15 MG/1
150 TABLET ORAL DAILY
Qty: 90 TABLET | Refills: 3 | Status: SHIPPED | OUTPATIENT
Start: 2024-01-24 | End: 2025-01-23

## 2024-01-24 RX ORDER — ASPIRIN 81 MG/1
81 TABLET ORAL DAILY
COMMUNITY

## 2024-01-24 RX ADMIN — CYANOCOBALAMIN 1000 MCG: 1000 INJECTION, SOLUTION INTRAMUSCULAR; SUBCUTANEOUS at 14:42

## 2024-01-24 NOTE — PROGRESS NOTES
"Chief Complaint  Hyperlipidemia and Med Refill    Subjective        HPI   Tony presents to [facility] for medication refill.  Labs are due.  Allergies are unchanged.  He does have some problems at night with using CPAP machine.  Thyroid stable pending lab results.  History of elevated blood sugar needs to be reexamined with labs.  Patient has had interval weight gain and information has been shared to help lower his weight with diet and exercise.  He remains under the care of cardiology for his lipids and blood pressure control.  Today his blood pressure is slightly above goal.  New problem of leg weakness since summer.    Skin tag of rectum (just noticed over last couple of weeks)     Objective   Vital Signs:   Vitals:    01/24/24 1429   BP: 144/88   BP Location: Left arm   Patient Position: Sitting   Pulse: 93   Resp: 16   Temp: 97.1 °F (36.2 °C)   TempSrc: Oral   SpO2: 97%   Weight: 132 kg (292 lb)   Height: 188 cm (74\")            9/12/2022     2:16 PM   PHQ-2/PHQ-9 Depression Screening   Little Interest or Pleasure in Doing Things 0-->not at all   Feeling Down, Depressed or Hopeless 1-->several days   PHQ-9: Brief Depression Severity Measure Score 1               Physical Exam  Vitals reviewed.   Constitutional:       General: He is not in acute distress.     Appearance: He is obese.   Eyes:      General: Lids are normal.      Conjunctiva/sclera: Conjunctivae normal.   Neck:      Vascular: No carotid bruit.      Trachea: No tracheal deviation.   Cardiovascular:      Rate and Rhythm: Normal rate and regular rhythm.      Heart sounds: Normal heart sounds. No murmur heard.  Pulmonary:      Effort: Pulmonary effort is normal.      Breath sounds: Normal breath sounds.   Genitourinary:         Comments: Fairly large perirectal mass approximately 3 to 4 cm and firm in texture.  Some breakdown of external skin.  No active bleeding.Firm to palpation  Skin:     General: Skin is warm and " dry.   Neurological:      Mental Status: He is alert. He is not disoriented.   Psychiatric:         Speech: Speech normal.         Behavior: Behavior normal. Behavior is cooperative.          Result Review :                Assessment and Plan    Assessment & Plan  B12 deficiency  B12 injection today.  Labs are due  Hypothyroidism, acquired  Check thyroid function with upcoming labs  Seasonal allergies  Condition is stable. The current medical regimen is effective;  continue present plan and medications.  Elevated blood sugar  Recheck blood sugar issues with upcoming labs  Stage 3a chronic kidney disease  Kidney condition stable pending lab results  Other hyperlipidemia  Lipids stable pending lab results  Benign essential hypertension  Initial blood pressure in the office elevated.  Patient states it is better controlled at home  Class 2 severe obesity due to excess calories with serious comorbidity and body mass index (BMI) of 37.0 to 37.9 in adult  Patient's (Body mass index is 37.49 kg/m².) indicates that they are morbidly/severely obese (BMI > 40 or > 35 with obesity - related health condition) with health conditions that include hypertension and dyslipidemias . Weight is worsening. BMI  is above average; BMI management plan is completed. We discussed portion control, increasing exercise, and Weight Watchers or other Commercial based weight reduction program.   Bilateral leg weakness  New problem.  Referral to physical therapy  Rectal mass  Newly identified problem.  Urgent referral to colorectal surgeon for evaluation and probable removal.    Orders Placed This Encounter   Procedures    Comprehensive Metabolic Panel    CK    Lipid Panel With / Chol / HDL Ratio    TSH    Methylmalonic Acid, Serum    Vitamin B12    Folate    Hemoglobin A1c    MicroAlbumin, Urine, Random - Urine, Clean Catch    Ambulatory Referral to Physical Therapy Evaluate and treat    Ambulatory Referral to Colorectal Surgery    CBC &  Differential     New Medications Ordered This Visit   Medications    folic acid (FOLVITE) 1 MG tablet     Sig: Take 1 tablet by mouth Daily.     Dispense:  90 tablet     Refill:  3    levothyroxine (SYNTHROID, LEVOTHROID) 150 MCG tablet     Sig: Take 1 tablet by mouth Daily.     Dispense:  90 tablet     Refill:  3    montelukast (SINGULAIR) 10 MG tablet     Sig: Take 1 tablet by mouth Every Evening.     Dispense:  90 tablet     Refill:  3          Follow Up   Return in about 6 months (around 7/24/2024) for Medicare Wellness Visit, schedule 30 min.  Patient was given instructions and counseling regarding his condition or for health maintenance advice. Please see specific information pulled into the AVS if appropriate.

## 2024-01-24 NOTE — PROGRESS NOTES
Injection  Injection performed in ld by Azul Clements MA. Patient tolerated the procedure well without complications.  01/24/24   Azul Clements MA

## 2024-01-24 NOTE — ASSESSMENT & PLAN NOTE
Patient's (Body mass index is 37.49 kg/m².) indicates that they are morbidly/severely obese (BMI > 40 or > 35 with obesity - related health condition) with health conditions that include hypertension and dyslipidemias . Weight is worsening. BMI  is above average; BMI management plan is completed. We discussed portion control, increasing exercise, and Weight Watchers or other Commercial based weight reduction program.

## 2024-01-30 RX ORDER — NITROGLYCERIN 0.4 MG/1
0.4 TABLET SUBLINGUAL
COMMUNITY
Start: 2024-01-29

## 2024-02-02 ENCOUNTER — OFFICE VISIT (OUTPATIENT)
Dept: SURGERY | Facility: CLINIC | Age: 74
End: 2024-02-02
Payer: MEDICARE

## 2024-02-02 VITALS
SYSTOLIC BLOOD PRESSURE: 140 MMHG | DIASTOLIC BLOOD PRESSURE: 80 MMHG | TEMPERATURE: 97.8 F | WEIGHT: 289.1 LBS | BODY MASS INDEX: 37.1 KG/M2 | HEART RATE: 95 BPM | HEIGHT: 74 IN | OXYGEN SATURATION: 97 %

## 2024-02-02 DIAGNOSIS — R22.9 SKIN MASS: Primary | ICD-10-CM

## 2024-02-02 PROCEDURE — 3079F DIAST BP 80-89 MM HG: CPT | Performed by: COLON & RECTAL SURGERY

## 2024-02-02 PROCEDURE — 99204 OFFICE O/P NEW MOD 45 MIN: CPT | Performed by: COLON & RECTAL SURGERY

## 2024-02-02 PROCEDURE — 3077F SYST BP >= 140 MM HG: CPT | Performed by: COLON & RECTAL SURGERY

## 2024-02-02 RX ORDER — SODIUM CHLORIDE 9 MG/ML
40 INJECTION, SOLUTION INTRAVENOUS AS NEEDED
OUTPATIENT
Start: 2024-02-02

## 2024-02-02 RX ORDER — SODIUM CHLORIDE 0.9 % (FLUSH) 0.9 %
3 SYRINGE (ML) INJECTION EVERY 12 HOURS SCHEDULED
OUTPATIENT
Start: 2024-02-02

## 2024-02-02 RX ORDER — SODIUM CHLORIDE 0.9 % (FLUSH) 0.9 %
10 SYRINGE (ML) INJECTION AS NEEDED
OUTPATIENT
Start: 2024-02-02

## 2024-02-02 NOTE — PROGRESS NOTES
"Tony Max is a 73 y.o. male who is seen for Consult and Mass.      HPI:  The patient was noted to have a perianal abnormality by his primary care provider and was referred to this clinic. His most recent colonoscopy was performed by Dr. Mo MD, on 05/30/2023, which was significant for several polyps with a return to happen in 3 years. The patient is also on Eliquis.    The patient can feel a nodule on his rectal area on palpation which is causing him irritation. This nodule is not hard. He has intermittent blood on wiping, and he is uncertain if the source of bleeding is from this nodule or a hemorrhoid. He denies any trouble with bowel movements. He has a bowel movement daily without requirement of medication for this. He has diarrhea occasionally.  He had diarrhea for a couple of days recently and his rectal area is \"raw.\"    The patient takes Eliquis, simvastatin, metoprolol, simvastatin, thyroid medication, lorazepam, BuSpar, aspirin, and folic acid. His last cardiac follow-up with Dr. No was a couple of weeks ago with a stress test and echocardiogram obtained at that visit.    He is an active smoker.    He has an abnormality in his kidney which is being monitored.    The patient has a history of appendectomy, cholecystectomy, knee surgery, and a prostate procedure to treat an enlarged prostate. He had 2 small nodes in his bladder that were removed through the cancer center approximately 4 years ago. He has undergone cataract surgery.    Past Medical History:   Diagnosis Date    Actinic keratosis     Allergic rhinitis     Anesthesia complication     PER PT, DURING A SURGERY WAS TOLD HIS BREATHING CAPICITY WAS LOW    Atrial fibrillation     BCC (basal cell carcinoma) 02/2020    OF FACE    BCC (basal cell carcinoma) 03/2019    LEFT FOREARM    Benign essential hypertension     Bladder cancer 2016    LATERAL WALL, G1aTa, S/P TURBT    BPH (benign prostatic hyperplasia)     FOLLOWED BY DR. RAUL LONDONO "    BPPV (benign paroxysmal positional vertigo) 09/23/2019    SEEN AT [facility] ER    CAD (coronary artery disease)     Cervical radiculopathy     Chronic anticoagulation 08/21/2018    CKD (chronic kidney disease)     STAGE 3    Colon polyps     FOLLOWED BY DR. COLTON BUENROSTRO    Cyst of right kidney 02/2020    Depression     Epididymitis 09/06/2023    SEEN AT     Erectile dysfunction 11/11/2022    JAIDA (generalized anxiety disorder)     Gilbert's syndrome 03/07/2022    Glaucoma     BILATERAL    Gross hematuria 05/2022    HLD (hyperlipidemia)     Hyperbilirubinemia 09/2019    Hypothyroidism, acquired     IBS (irritable bowel syndrome)     Kidney stones     Lumbar strain 06/16/2018    SEEN AT     Lung nodule     Mass of anus     Neuropathy     Non-ST elevated myocardial infarction (non-STEMI) 05/15/2011    PCI X2, ADMITTED TO [facility]    Ocular hypertension, bilateral     MIMI (obstructive sleep apnea)     C-PAP    Other bursal cyst, right hand 07/2017    Panic disorder     Rectal mass 01/2024    Right epididymitis 04/06/2021    SEEN AT     Seborrheic keratosis     Urge incontinence     Venous insufficiency     Vitamin B 12 deficiency     Vitreous degeneration of both eyes        Past Surgical History:   Procedure Laterality Date    APPENDECTOMY N/A 1963    CHOLECYSTECTOMY N/A 2004    LAPAROSCOPIC    COLONOSCOPY N/A 11/18/2005    ENTIRE COLON WNL, DR.RAYMOND CHOI AT [facility]    COLONOSCOPY N/A 05/30/2023    5 TUBULOVILLOUS ADENOMA POLYPS IN ASCENDING, 2 TUBULOVILLOUS ADENOMA POLYPS IN TRANSVERSE, 4 MM TUBULAR ADENOMA POLYP IN DESCENDING, 4 MM BENIGN POLYP IN RECTUM, RESCOPE IN 3 YRS, DR. COLTON BUENROSTRO AT [facility]    COLONOSCOPY N/A 2019    CORONARY ANGIOPLASTY WITH STENT PLACEMENT Left 05/14/2011    ION ANUJA X2 TO MID 1ST DIAGONAL BRANCH, DR.VIPUL MARINA AT [facility]    KIDNEY STONE SURGERY N/A 2002    KNEE SURGERY Right 1975    TUMOR EXCISION    PROSTATE SURGERY N/A 03/19/2019    TRANSURETHRAL RESECTION OF PROSTATE WITH GREEN LIGHT  LASER, DR. RAUL LONDONO AT [place]    SKIN BIOPSY Left 08/19/2018    LEFT PROXIMAL FOREARM, (+) BCC, DR. MARIBELL HERRERA    SKIN BIOPSY N/A 08/19/2018    NASAL ROOT, PATH: ANGIOFIBROMA, LEFT ANTERIOR AXILLA, PATH: VERRUCA,  DR. MARIBELL HERRERA    TONSILLECTOMY Bilateral 1961    TRANSURETHRAL RESECTION OF BLADDER TUMOR N/A 11/21/2016    Procedure: CYSTOSCOPY TRANSURETHRAL RESECTION OF BLADDER TUMOR;  Surgeon: Piotr Villegas MD;  Location: [facility] OR;  Service:     VASECTOMY N/A 1982       Social History:   reports that he has been smoking cigarettes. He has a 60.00 pack-year smoking history. He has been exposed to tobacco smoke. He has never used smokeless tobacco. He reports that he does not drink alcohol and does not use drugs.      Marriage status:     Family History   Problem Relation Age of Onset    Hyperlipidemia Mother     Anxiety disorder Mother     Depression Mother     Diabetes Mother     Heart disease Mother     Hypertension Mother     Cancer Father     Prostate cancer Father     Cancer Sister     Depression Sister     Thyroid disease Sister     Esophageal cancer Sister     Cancer Sister     Breast cancer Sister     Malig Hyperthermia Neg Hx          Current Outpatient Medications:     apixaban (ELIQUIS) 5 MG tablet tablet, Take 1 tablet by mouth 2 (Two) Times a Day. PT IS HOLDING FOR 2 DAYS BEFORE SURGERY PER CARDIOLOGY, Disp: , Rfl:     aspirin 81 MG EC tablet, Take 1 tablet by mouth Daily. INSTRUCTED PT TO FOLLOW MD INSTRUCTIONS REGARDING SURGERY, Disp: , Rfl:     busPIRone (BUSPAR) 10 MG tablet, Take 1 tablet by mouth Every 12 (Twelve) Hours., Disp: , Rfl:     finasteride (PROSCAR) 5 MG tablet, Take 1 tablet by mouth Daily., Disp: , Rfl:     folic acid (FOLVITE) 1 MG tablet, Take 1 tablet by mouth Daily., Disp: 90 tablet, Rfl: 3    levothyroxine (SYNTHROID, LEVOTHROID) 150 MCG tablet, Take 1 tablet by mouth Daily., Disp: 90 tablet, Rfl: 3    LORazepam (ATIVAN) 1 MG tablet, Take 1 tablet  by mouth 3 times a day., Disp: , Rfl:     metoprolol succinate XL (TOPROL-XL) 25 MG 24 hr tablet, Take 1 tablet by mouth 2 (Two) Times a Day., Disp: , Rfl:     montelukast (SINGULAIR) 10 MG tablet, Take 1 tablet by mouth Every Evening., Disp: 90 tablet, Rfl: 3    nitroglycerin (NITROSTAT) 0.4 MG SL tablet, Place 1 tablet under the tongue Every 5 (Five) Minutes As Needed., Disp: , Rfl:     simvastatin (ZOCOR) 20 MG tablet, Take 1 tablet by mouth Every Night., Disp: , Rfl:     tadalafil (CIALIS) 5 MG tablet, Take 1 tablet by mouth Daily As Needed for Erectile Dysfunction. 60 minutes prior to sexual activity/HOLD FOR 48 HOUR BEFORE SURGERY, Disp: , Rfl:     Current Facility-Administered Medications:     cyanocobalamin injection 1,000 mcg, 1,000 mcg, Intramuscular, Q28 Days, Lane Puri MD, 1,000 mcg at 12/14/23 1339    cyanocobalamin injection 1,000 mcg, 1,000 mcg, Intramuscular, Q7 Days, Lane Puri MD, 1,000 mcg at 01/24/24 1442    Allergy  Pristiq [desvenlafaxine], Prozac [fluoxetine], Viibryd [vilazodone hcl], Zoloft [sertraline], Keflex [cephalexin], and Augmentin [amoxicillin-pot clavulanate]    Review of Systems   Constitutional: Negative for decreased appetite and weight gain.   HENT:  Negative for congestion, hearing loss and hoarse voice.    Eyes:  Negative for blurred vision, discharge and visual disturbance.   Cardiovascular:  Negative for chest pain, cyanosis and leg swelling.   Respiratory:  Positive for shortness of breath. Negative for cough, sleep disturbances due to breathing and snoring.    Endocrine: Positive for polyuria. Negative for cold intolerance and heat intolerance.   Hematologic/Lymphatic: Does not bruise/bleed easily.   Skin:  Negative for itching, poor wound healing and skin cancer.   Musculoskeletal:  Negative for arthritis, back pain, joint pain and joint swelling.   Gastrointestinal:  Positive for diarrhea. Negative for abdominal pain, change in bowel habit, bowel incontinence  and constipation.   Genitourinary:  Positive for frequency. Negative for bladder incontinence, dysuria and hematuria.   Neurological:  Positive for disturbances in coordination and light-headedness. Negative for brief paralysis, excessive daytime sleepiness, dizziness, focal weakness, headaches and weakness.   Psychiatric/Behavioral:  Negative for altered mental status and hallucinations. The patient is nervous/anxious. The patient does not have insomnia.    Allergic/Immunologic: Negative for HIV exposure and persistent infections.   All other systems reviewed and are negative.      Vitals:    02/02/24 1448   BP: 140/80   Pulse: 95   Temp: 97.8 °F (36.6 °C)   SpO2: 97%     Body mass index is 37.12 kg/m².    Physical Exam  Genitourinary:     Comments: Perianal anterior, he has a 3 cm nodule that is mobile that appears to be anal skin cancer. It is about 4 cm from the anal verge. Patient has good tone on digital rectal exam.      Review of Medical Record:  I reviewed medical records as detailed in HPI.     Assessment:  1. 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 This mass will be sent for pathology and further treatment can be determined at that time. I described the typical surgical time, postop recovery including pain management, and restrictions. I discussed with patient risks, benefits, and alternatives. The patient had an opportunity to ask questions, and all were answered. Patient understands and wishes to proceed with the procedure. He will need to discontinue Eliquis for a couple of days. He will take a stool softener for a few weeks after this procedure. He will use lidocaine jelly to numb the affected area.    Transcribed from ambient dictation for Dionna Rees MD by Bryan Carolina  02/20/24     Patient or patient representative verbalized consent to the visit recording.  I have personally performed the services described in this  document as transcribed by the above individual, and it is both accurate and complete.  Patient verbalized consent to the visit recording.

## 2024-02-05 ENCOUNTER — OFFICE VISIT (OUTPATIENT)
Dept: FAMILY MEDICINE CLINIC | Facility: CLINIC | Age: 74
End: 2024-02-05
Payer: MEDICARE

## 2024-02-05 VITALS
TEMPERATURE: 97.6 F | SYSTOLIC BLOOD PRESSURE: 146 MMHG | WEIGHT: 289.3 LBS | OXYGEN SATURATION: 98 % | HEIGHT: 74 IN | BODY MASS INDEX: 37.13 KG/M2 | HEART RATE: 61 BPM | DIASTOLIC BLOOD PRESSURE: 70 MMHG

## 2024-02-05 DIAGNOSIS — I10 BENIGN ESSENTIAL HYPERTENSION: ICD-10-CM

## 2024-02-05 DIAGNOSIS — E78.49 OTHER HYPERLIPIDEMIA: ICD-10-CM

## 2024-02-05 DIAGNOSIS — N18.31 STAGE 3A CHRONIC KIDNEY DISEASE: ICD-10-CM

## 2024-02-05 DIAGNOSIS — E03.9 HYPOTHYROIDISM, ACQUIRED: ICD-10-CM

## 2024-02-05 DIAGNOSIS — R73.01 IFG (IMPAIRED FASTING GLUCOSE): Primary | ICD-10-CM

## 2024-02-05 PROCEDURE — G2211 COMPLEX E/M VISIT ADD ON: HCPCS | Performed by: FAMILY MEDICINE

## 2024-02-05 PROCEDURE — 3077F SYST BP >= 140 MM HG: CPT | Performed by: FAMILY MEDICINE

## 2024-02-05 PROCEDURE — 99214 OFFICE O/P EST MOD 30 MIN: CPT | Performed by: FAMILY MEDICINE

## 2024-02-05 PROCEDURE — 3078F DIAST BP <80 MM HG: CPT | Performed by: FAMILY MEDICINE

## 2024-02-05 NOTE — PATIENT INSTRUCTIONS
Carbohydrate Counting for Diabetes Mellitus, Adult  Carbohydrate counting is a method of keeping track of how many carbohydrates you eat. Eating carbohydrates increases the amount of sugar (glucose) in the blood. Counting how many carbohydrates you eat improves how well you manage your blood glucose. This, in turn, helps you manage your diabetes.  Carbohydrates are measured in grams (g) per serving. It is important to know how many carbohydrates (in grams or by serving size) you can have in each meal. This is different for every person. A dietitian can help you make a meal plan and calculate how many carbohydrates you should have at each meal and snack.  What foods contain carbohydrates?  Carbohydrates are found in the following foods:  Grains, such as breads and cereals.  Dried beans and soy products.  Starchy vegetables, such as potatoes, peas, and corn.  Fruit and fruit juices.  Milk and yogurt.  Sweets and snack foods, such as cake, cookies, candy, chips, and soft drinks.  How do I count carbohydrates in foods?  There are two ways to count carbohydrates in food. You can read food labels or learn standard serving sizes of foods. You can use either of these methods or a combination of both.  Using the Nutrition Facts label  The Nutrition Facts list is included on the labels of almost all packaged foods and beverages in the [place]. It includes:  The serving size.  Information about nutrients in each serving, including the grams of carbohydrate per serving.  To use the Nutrition Facts, decide how many servings you will have. Then, multiply the number of servings by the number of carbohydrates per serving. The resulting number is the total grams of carbohydrates that you will be having.  Learning the standard serving sizes of foods  When you eat carbohydrate foods that are not packaged or do not include Nutrition Facts on the label, you need to measure the servings in order to count the grams of  carbohydrates.  Measure the foods that you will eat with a food scale or measuring cup, if needed.  Decide how many standard-size servings you will eat.  Multiply the number of servings by 15. For foods that contain carbohydrates, one serving equals 15 g of carbohydrates.  For example, if you eat 2 cups or 10 oz (300 g) of strawberries, you will have eaten 2 servings and 30 g of carbohydrates (2 servings x 15 g = 30 g).  For foods that have more than one food mixed, such as soups and casseroles, you must count the carbohydrates in each food that is included.  The following list contains standard serving sizes of common carbohydrate-rich foods. Each of these servings has about 15 g of carbohydrates:  1 slice of bread.  1 six-inch (15 cm) tortilla.  ? cup or 2 oz (53 g) cooked rice or pasta.  ½ cup or 3 oz (85 g) cooked or canned, drained and rinsed beans or lentils.  ½ cup or 3 oz (85 g) starchy vegetable, such as peas, corn, or squash.  ½ cup or 4 oz (120 g) hot cereal.  ½ cup or 3 oz (85 g) boiled or mashed potatoes, or ¼ or 3 oz (85 g) of a large baked potato.  ½ cup or 4 fl oz (118 mL) fruit juice.  1 cup or 8 fl oz (237 mL) milk.  1 small or 4 oz (106 g) apple.  ½ or 2 oz (63 g) of a medium banana.  1 cup or 5 oz (150 g) strawberries.  3 cups or 1 oz (28.3 g) popped popcorn.  What is an example of carbohydrate counting?  To calculate the grams of carbohydrates in this sample meal, follow the steps shown below.  Sample meal  3 oz (85 g) chicken breast.  ? cup or 4 oz (106 g) brown rice.  ½ cup or 3 oz (85 g) corn.  1 cup or 8 fl oz (237 mL) milk.  1 cup or 5 oz (150 g) strawberries with sugar-free whipped topping.  Carbohydrate calculation  Identify the foods that contain carbohydrates:  Rice.  Corn.  Milk.  Strawberries.  Calculate how many servings you have of each food:  2 servings rice.  1 serving corn.  1 serving milk.  1 serving strawberries.  Multiply each number of servings by 15  servings rice x 15  g = 30 g.  1 serving corn x 15 g = 15 g.  1 serving milk x 15 g = 15 g.  1 serving strawberries x 15 g = 15 g.  Add together all of the amounts to find the total grams of carbohydrates eaten:  30 g + 15 g + 15 g + 15 g = 75 g of carbohydrates total.  What are tips for following this plan?  Shopping  Develop a meal plan and then make a shopping list.  Buy fresh and frozen vegetables, fresh and frozen fruit, dairy, eggs, beans, lentils, and whole grains.  Look at food labels. Choose foods that have more fiber and less sugar.  Avoid processed foods and foods with added sugars.  Meal planning  Aim to have the same number of grams of carbohydrates at each meal and for each snack time.  Plan to have regular, balanced meals and snacks.  Where to find more information  American Diabetes Association: diabetes.org  Centers for Disease Control and Prevention: cdc.gov  Academy of Nutrition and Dietetics: eatright.org  Association of Diabetes Care & Education Specialists: diabeteseducator.org  Summary  Carbohydrate counting is a method of keeping track of how many carbohydrates you eat.  Eating carbohydrates increases the amount of sugar (glucose) in your blood.  Counting how many carbohydrates you eat improves how well you manage your blood glucose. This helps you manage your diabetes.  A dietitian can help you make a meal plan and calculate how many carbohydrates you should have at each meal and snack.  This information is not intended to replace advice given to you by your health care provider. Make sure you discuss any questions you have with your health care provider.  Document Revised: 07/21/2021 Document Reviewed: 07/21/2021  [employer] Patient Education © 2023 [employer]  Exercising to Stay Healthy  To become healthy and stay healthy, it is recommended that you do moderate-intensity and vigorous-intensity exercise. You can tell that you are exercising at a moderate intensity if your heart starts beating faster and you  start breathing faster but can still hold a conversation. You can tell that you are exercising at a vigorous intensity if you are breathing much harder and faster and cannot hold a conversation while exercising.  How can exercise benefit me?  Exercising regularly is important. It has many health benefits, such as:  Improving overall fitness, flexibility, and endurance.  Increasing bone density.  Helping with weight control.  Decreasing body fat.  Increasing muscle strength and endurance.  Reducing stress and tension, anxiety, depression, or anger.  Improving overall health.  What guidelines should I follow while exercising?  Before you start a new exercise program, talk with your health care provider.  Do not exercise so much that you hurt yourself, feel dizzy, or get very short of breath.  Wear comfortable clothes and wear shoes with good support.  Drink plenty of water while you exercise to prevent dehydration or heat stroke.  Work out until your breathing and your heartbeat get faster (moderate intensity).  How often should I exercise?  Choose an activity that you enjoy, and set realistic goals. Your health care provider can help you make an activity plan that is individually designed and works best for you.  Exercise regularly as told by your health care provider. This may include:  Doing strength training two times a week, such as:  Lifting weights.  Using resistance bands.  Push-ups.  Sit-ups.  Yoga.  Doing a certain intensity of exercise for a given amount of time. Choose from these options:  A total of 150 minutes of moderate-intensity exercise every week.  A total of 75 minutes of vigorous-intensity exercise every week.  A mix of moderate-intensity and vigorous-intensity exercise every week.  Children, pregnant women, people who have not exercised regularly, people who are overweight, and older adults may need to talk with a health care provider about what activities are safe to perform. If you have a  medical condition, be sure to talk with your health care provider before you start a new exercise program.  What are some exercise ideas?  Moderate-intensity exercise ideas include:  Walking 1 mile (1.6 km) in about 15 minutes.  Biking.  Hiking.  Golfing.  Dancing.  Water aerobics.  Vigorous-intensity exercise ideas include:  Walking 4.5 miles (7.2 km) or more in about 1 hour.  Jogging or running 5 miles (8 km) in about 1 hour.  Biking 10 miles (16.1 km) or more in about 1 hour.  Lap swimming.  Roller-skating or in-line skating.  Cross-country skiing.  Vigorous competitive sports, such as football, basketball, and soccer.  Jumping rope.  Aerobic dancing.  What are some everyday activities that can help me get exercise?  Yard work, such as:  Pushing a .  Raking and bagging leaves.  Washing your car.  Pushing a stroller.  Shoveling snow.  Gardening.  Washing windows or floors.  How can I be more active in my day-to-day activities?  Use stairs instead of an elevator.  Take a walk during your lunch break.  If you drive, park your car farther away from your work or school.  If you take public transportation, get off one stop early and walk the rest of the way.  Stand up or walk around during all of your indoor phone calls.  Get up, stretch, and walk around every 30 minutes throughout the day.  Enjoy exercise with a friend. Support to continue exercising will help you keep a regular routine of activity.  Where to find more information  You can find more information about exercising to stay healthy from:  [place] Department of Health and Human Services: www.hhs.gov  Centers for Disease Control and Prevention (CDC): www.cdc.gov  Summary  Exercising regularly is important. It will improve your overall fitness, flexibility, and endurance.  Regular exercise will also improve your overall health. It can help you control your weight, reduce stress, and improve your bone density.  Do not exercise so much that you hurt  yourself, feel dizzy, or get very short of breath.  Before you start a new exercise program, talk with your health care provider.  This information is not intended to replace advice given to you by your health care provider. Make sure you discuss any questions you have with your health care provider.  Document Revised: 04/15/2022 Document Reviewed: 04/15/2022  [employer] Patient Education © 2023 [employer]

## 2024-02-05 NOTE — ASSESSMENT & PLAN NOTE
Impaired fasting glucose identified on labs.  Information on low carbohydrate diet shared in the after visit summary.  Encouraged regular exercise 30 minutes 5 days weekly as tolerated.  Encouraged weight loss.  If he gets frustrated with his own efforts for weight loss then recommended weight watchers to help attain his optimal weight goal.

## 2024-02-05 NOTE — PROGRESS NOTES
"Chief Complaint  Hypothyroidism, Hypertension, and Hyperlipidemia    Subjective        Hypothyroidism    Hypertension    Hyperlipidemia  Exacerbating diseases include hypothyroidism.      Tony presents to [facility] for    To review recent labs.  His blood sugar is mildly elevated.  He does have impaired fasting glucose.  Hemoglobin A1c is 5.8.  His protein in the urine is once again evident at 63.  There has been interval improvement.  Initial blood pressure is slightly above goal.  Patient states his blood pressure at home is much better controlled.  Since last visit he has seen colorectal surgeon and has planned surgery to remove buttock lesion to rule out cancerous growth.  Mild elevation in CPK noted.  He occasionally has leg cramps.  He will discuss this issue with his cardiologist to prescribes his simvastatin.        Objective   Vital Signs:   Vitals:    02/05/24 1317   BP: 146/70   Pulse: 61   Temp: 97.6 °F (36.4 °C)   TempSrc: Oral   SpO2: 98%   Weight: 131 kg (289 lb 4.8 oz)   Height: 188 cm (74\")   PainSc:   3   PainLoc: Shoulder            9/12/2022     2:16 PM   PHQ-2/PHQ-9 Depression Screening   Little Interest or Pleasure in Doing Things 0-->not at all   Feeling Down, Depressed or Hopeless 1-->several days   PHQ-9: Brief Depression Severity Measure Score 1                 Physical Exam  Vitals reviewed.   Constitutional:       General: He is not in acute distress.     Appearance: He is obese.   Eyes:      General: Lids are normal.      Conjunctiva/sclera: Conjunctivae normal.   Neck:      Vascular: No carotid bruit.      Trachea: No tracheal deviation.   Cardiovascular:      Rate and Rhythm: Normal rate. Rhythm irregularly irregular.      Heart sounds: Normal heart sounds. No murmur heard.  Pulmonary:      Effort: Pulmonary effort is normal.      Breath sounds: Normal breath sounds.   Skin:     General: Skin is warm and dry.   Neurological:      Mental Status: He is " alert. He is not disoriented.   Psychiatric:         Speech: Speech normal.         Behavior: Behavior normal. Behavior is cooperative.          Result Review :     The following data was reviewed by: Lane Puri MD on 02/05/2024:  MicroAlbumin, Urine, Random - Urine, Clean Catch (01/25/2024 08:37)-elevated  Hemoglobin A1c (01/25/2024 08:37)-5.8  Folate (01/25/2024 08:37)  Vitamin B12 (01/25/2024 08:37)  Methylmalonic Acid, Serum (01/25/2024 08:37)  TSH (01/25/2024 08:37)  Lipid Panel With / Chol / HDL Ratio (01/25/2024 08:37)-LDL cholesterol 44  CK (01/25/2024 08:37)-CPK elevated  CBC & Differential (01/25/2024 08:37)-platelets 134  Comprehensive Metabolic Panel (01/25/2024 08:37)-total bilirubin elevated once again, mild elevation in fasting blood sugar         Assessment and Plan    Assessment & Plan  IFG (impaired fasting glucose)  Impaired fasting glucose identified on labs.  Information on low carbohydrate diet shared in the after visit summary.  Encouraged regular exercise 30 minutes 5 days weekly as tolerated.  Encouraged weight loss.  If he gets frustrated with his own efforts for weight loss then recommended weight watchers to help attain his optimal weight goal.  Hypothyroidism, acquired  Check labs  Stage 3a chronic kidney disease  Encouraged 6-8 glasses of water daily.   Other hyperlipidemia  The current medical regimen is effective;  continue present plan and medications.    Benign essential hypertension  The current medical regimen is effective;  continue present plan and medications.      Orders Placed This Encounter   Procedures    Comprehensive Metabolic Panel    Lipid Panel    CK    TSH    Hemoglobin A1c    MicroAlbumin, Urine, Random - Urine, Clean Catch    CBC & Differential           Follow Up   Return in 6 months (on 7/24/2024) for next scheduled follow up.  Patient was given instructions and counseling regarding his condition or for health maintenance advice. Please see specific information  pulled into the AVS if appropriate.

## 2024-02-20 ENCOUNTER — PRE-ADMISSION TESTING (OUTPATIENT)
Dept: PREADMISSION TESTING | Facility: HOSPITAL | Age: 74
End: 2024-02-20
Payer: MEDICARE

## 2024-02-20 VITALS
WEIGHT: 282.7 LBS | BODY MASS INDEX: 37.47 KG/M2 | TEMPERATURE: 97.7 F | OXYGEN SATURATION: 97 % | SYSTOLIC BLOOD PRESSURE: 156 MMHG | DIASTOLIC BLOOD PRESSURE: 100 MMHG | RESPIRATION RATE: 18 BRPM | HEIGHT: 73 IN | HEART RATE: 92 BPM

## 2024-02-20 DIAGNOSIS — R22.9 SKIN MASS: ICD-10-CM

## 2024-02-20 LAB
ANION GAP SERPL CALCULATED.3IONS-SCNC: 11.3 MMOL/L (ref 5–15)
BUN SERPL-MCNC: 17 MG/DL (ref 8–23)
BUN/CREAT SERPL: 13.6 (ref 7–25)
CALCIUM SPEC-SCNC: 8.5 MG/DL (ref 8.6–10.5)
CHLORIDE SERPL-SCNC: 109 MMOL/L (ref 98–107)
CO2 SERPL-SCNC: 21.7 MMOL/L (ref 22–29)
CREAT SERPL-MCNC: 1.25 MG/DL (ref 0.76–1.27)
DEPRECATED RDW RBC AUTO: 43.7 FL (ref 37–54)
EGFRCR SERPLBLD CKD-EPI 2021: 60.4 ML/MIN/1.73
ERYTHROCYTE [DISTWIDTH] IN BLOOD BY AUTOMATED COUNT: 12.3 % (ref 12.3–15.4)
GLUCOSE SERPL-MCNC: 99 MG/DL (ref 65–99)
HCT VFR BLD AUTO: 46.5 % (ref 37.5–51)
HGB BLD-MCNC: 15.9 G/DL (ref 13–17.7)
MCH RBC QN AUTO: 33.5 PG (ref 26.6–33)
MCHC RBC AUTO-ENTMCNC: 34.2 G/DL (ref 31.5–35.7)
MCV RBC AUTO: 97.9 FL (ref 79–97)
PLATELET # BLD AUTO: 152 10*3/MM3 (ref 140–450)
PMV BLD AUTO: 12.3 FL (ref 6–12)
POTASSIUM SERPL-SCNC: 3.6 MMOL/L (ref 3.5–5.2)
QT INTERVAL: 382 MS
QTC INTERVAL: 438 MS
RBC # BLD AUTO: 4.75 10*6/MM3 (ref 4.14–5.8)
SODIUM SERPL-SCNC: 142 MMOL/L (ref 136–145)
WBC NRBC COR # BLD AUTO: 8.22 10*3/MM3 (ref 3.4–10.8)

## 2024-02-20 PROCEDURE — 80048 BASIC METABOLIC PNL TOTAL CA: CPT

## 2024-02-20 PROCEDURE — 93010 ELECTROCARDIOGRAM REPORT: CPT | Performed by: INTERNAL MEDICINE

## 2024-02-20 PROCEDURE — 93005 ELECTROCARDIOGRAM TRACING: CPT

## 2024-02-20 PROCEDURE — 36415 COLL VENOUS BLD VENIPUNCTURE: CPT

## 2024-02-20 PROCEDURE — 85027 COMPLETE CBC AUTOMATED: CPT

## 2024-02-20 NOTE — DISCHARGE INSTRUCTIONS
Take the following medications the morning of surgery:  BUSPIRONE,LEVOTHYROXINE,LORAZEPAM AND METOPROLOL    ARRIVAL TIME 8:00 AM    If you are on prescription narcotic pain medication to control your pain you may also take that medication the morning of surgery.    General Instructions:  Do not eat solid food after midnight the night before surgery.  You may drink clear liquids day of surgery but must stop at least one hour before your hospital arrival time.  It is beneficial for you to have a clear drink that contains carbohydrates the day of surgery.  We suggest a 12 to 20 ounce bottle of Gatorade or Powerade for non-diabetic patients or a 12 to 20 ounce bottle of G2 or Powerade Zero for diabetic patients. (Pediatric patients, are not advised to drink a 12 to 20 ounce carbohydrate drink)    Clear liquids are liquids you can see through.  Nothing red in color.     Plain water                               Sports drinks  Sodas                                   Gelatin (Jell-O)  Fruit juices without pulp such as white grape juice and apple juice  Popsicles that contain no fruit or yogurt  Tea or coffee (no cream or milk added)  Gatorade / Powerade  G2 / Powerade Zero    Infants may have breast milk up to four hours before surgery.  Infants drinking formula may drink formula up to six hours before surgery.   Patients who avoid smoking, chewing tobacco and alcohol for 4 weeks prior to surgery have a reduced risk of post-operative complications.  Quit smoking as many days before surgery as you can.  Do not smoke, use chewing tobacco or drink alcohol the day of surgery.   If applicable bring your C-PAP/ BI-PAP machine in with you to preop day of surgery.  Bring any papers given to you in the doctor’s office.  Wear clean comfortable clothes.  Do not wear contact lenses, false eyelashes or make-up.  Bring a case for your glasses.   Bring crutches or walker if applicable.  Remove all piercings.  Leave jewelry and any other  valuables at home.  Hair extensions with metal clips must be removed prior to surgery.  The Pre-Admission Testing nurse will instruct you to bring medications if unable to obtain an accurate list in Pre-Admission Testing.        If you were given a blood bank ID arm band remember to bring it with you the day of surgery.    Preventing a Surgical Site Infection:  For 2 to 3 days before surgery, avoid shaving with a razor because the razor can irritate skin and make it easier to develop an infection.    Any areas of open skin can increase the risk of a post-operative wound infection by allowing bacteria to enter and travel throughout the body.  Notify your surgeon if you have any skin wounds / rashes even if it is not near the expected surgical site.  The area will need assessed to determine if surgery should be delayed until it is healed.  The night prior to surgery shower using a fresh bar of anti-bacterial soap (such as Dial) and clean washcloth.  Sleep in a clean bed with clean clothing.  Do not allow pets to sleep with you.  Shower on the morning of surgery using a fresh bar of anti-bacterial soap (such as Dial) and clean washcloth.  Dry with a clean towel and dress in clean clothing.  Ask your surgeon if you will be receiving antibiotics prior to surgery.  Make sure you, your family, and all healthcare providers clean their hands with soap and water or an alcohol based hand  before caring for you or your wound.    Day of surgery:  Your arrival time is approximately two hours before your scheduled surgery time.  Upon arrival, a Pre-op nurse and Anesthesiologist will review your health history, obtain vital signs, and answer questions you may have.  The only belongings needed at this time will be a list of your home medications and if applicable your C-PAP/BI-PAP machine.  A Pre-op nurse will start an IV and you may receive medication in preparation for surgery, including something to help you relax.      Please be aware that surgery does come with discomfort.  We want to make every effort to control your discomfort so please discuss any uncontrolled symptoms with your nurse.   Your doctor will most likely have prescribed pain medications.      If you are going home after surgery you will receive individualized written care instructions before being discharged.  A responsible adult must drive you to and from the hospital on the day of your surgery and ideally stay with you through the night.   .  Discharge prescriptions can be filled by the hospital pharmacy during regular pharmacy hours.  If you are having surgery late in the day/evening your prescription may be e-prescribed to your pharmacy.  Please verify your pharmacy hours or chose a 24 hour pharmacy to avoid not having access to your prescription because your pharmacy has closed for the day.    If you are staying overnight following surgery, you will be transported to your hospital room following the recovery period.  [facility] has all private rooms.    If you have any questions please call Pre-Admission Testing at [phone].  Deductibles and co-payments are collected on the day of service. Please be prepared to pay the required co-pay, deductible or deposit on the day of service as defined by your plan.    Call your surgeon immediately if you experience any of the following symptoms:  Sore Throat  Shortness of Breath or difficulty breathing  Cough  Chills  Body soreness or muscle pain  Headache  Fever  New loss of taste or smell  Do not arrive for your surgery ill.  Your procedure will need to be rescheduled to another time.  You will need to call your physician before the day of surgery to avoid any unnecessary exposure to hospital staff as well as other patients.

## 2024-03-07 ENCOUNTER — ANESTHESIA EVENT (OUTPATIENT)
Dept: PERIOP | Facility: HOSPITAL | Age: 74
End: 2024-03-07
Payer: MEDICARE

## 2024-03-07 ENCOUNTER — HOSPITAL ENCOUNTER (OUTPATIENT)
Facility: HOSPITAL | Age: 74
Setting detail: HOSPITAL OUTPATIENT SURGERY
Discharge: HOME | End: 2024-03-07
Attending: COLON & RECTAL SURGERY | Admitting: COLON & RECTAL SURGERY
Payer: MEDICARE

## 2024-03-07 ENCOUNTER — ANESTHESIA (OUTPATIENT)
Dept: PERIOP | Facility: HOSPITAL | Age: 74
End: 2024-03-07
Payer: MEDICARE

## 2024-03-07 VITALS
DIASTOLIC BLOOD PRESSURE: 92 MMHG | HEART RATE: 64 BPM | SYSTOLIC BLOOD PRESSURE: 136 MMHG | BODY MASS INDEX: 38.01 KG/M2 | WEIGHT: 286.82 LBS | OXYGEN SATURATION: 97 % | HEIGHT: 73 IN | RESPIRATION RATE: 16 BRPM | TEMPERATURE: 97.3 F

## 2024-03-07 DIAGNOSIS — R22.9 SKIN MASS: ICD-10-CM

## 2024-03-07 PROCEDURE — 25010000002 PROPOFOL 10 MG/ML EMULSION: Performed by: NURSE ANESTHETIST, CERTIFIED REGISTERED

## 2024-03-07 PROCEDURE — 25010000002 CLINDAMYCIN 900 MG/50ML SOLUTION: Performed by: COLON & RECTAL SURGERY

## 2024-03-07 PROCEDURE — 46922 EXCISION OF ANAL LESION(S): CPT | Performed by: COLON & RECTAL SURGERY

## 2024-03-07 PROCEDURE — 25010000002 DEXAMETHASONE SODIUM PHOSPHATE 20 MG/5ML SOLUTION: Performed by: NURSE ANESTHETIST, CERTIFIED REGISTERED

## 2024-03-07 PROCEDURE — 25810000003 LACTATED RINGERS PER 1000 ML: Performed by: ANESTHESIOLOGY

## 2024-03-07 PROCEDURE — 25010000002 ONDANSETRON PER 1 MG: Performed by: NURSE ANESTHETIST, CERTIFIED REGISTERED

## 2024-03-07 PROCEDURE — 88305 TISSUE EXAM BY PATHOLOGIST: CPT | Performed by: COLON & RECTAL SURGERY

## 2024-03-07 PROCEDURE — 25010000002 FENTANYL CITRATE (PF) 50 MCG/ML SOLUTION: Performed by: NURSE ANESTHETIST, CERTIFIED REGISTERED

## 2024-03-07 PROCEDURE — 25010000002 KETOROLAC TROMETHAMINE PER 15 MG: Performed by: NURSE ANESTHETIST, CERTIFIED REGISTERED

## 2024-03-07 PROCEDURE — 25010000002 BUPIVACAINE (PF) 0.25 % SOLUTION 30 ML VIAL: Performed by: COLON & RECTAL SURGERY

## 2024-03-07 PROCEDURE — 25010000002 SUGAMMADEX 200 MG/2ML SOLUTION: Performed by: NURSE ANESTHETIST, CERTIFIED REGISTERED

## 2024-03-07 PROCEDURE — 25010000002 AZTREONAM PER 500 MG: Performed by: COLON & RECTAL SURGERY

## 2024-03-07 RX ORDER — HYDROMORPHONE HYDROCHLORIDE 1 MG/ML
0.5 INJECTION, SOLUTION INTRAMUSCULAR; INTRAVENOUS; SUBCUTANEOUS
Status: DISCONTINUED | OUTPATIENT
Start: 2024-03-07 | End: 2024-03-07 | Stop reason: HOSPADM

## 2024-03-07 RX ORDER — FENTANYL CITRATE 50 UG/ML
50 INJECTION, SOLUTION INTRAMUSCULAR; INTRAVENOUS
Status: DISCONTINUED | OUTPATIENT
Start: 2024-03-07 | End: 2024-03-07 | Stop reason: HOSPADM

## 2024-03-07 RX ORDER — MAGNESIUM HYDROXIDE 1200 MG/15ML
LIQUID ORAL AS NEEDED
Status: DISCONTINUED | OUTPATIENT
Start: 2024-03-07 | End: 2024-03-07 | Stop reason: HOSPADM

## 2024-03-07 RX ORDER — SODIUM CHLORIDE, SODIUM LACTATE, POTASSIUM CHLORIDE, CALCIUM CHLORIDE 600; 310; 30; 20 MG/100ML; MG/100ML; MG/100ML; MG/100ML
9 INJECTION, SOLUTION INTRAVENOUS CONTINUOUS
Status: DISCONTINUED | OUTPATIENT
Start: 2024-03-07 | End: 2024-03-07 | Stop reason: HOSPADM

## 2024-03-07 RX ORDER — ACETAMINOPHEN 650 MG
TABLET, EXTENDED RELEASE ORAL AS NEEDED
Status: DISCONTINUED | OUTPATIENT
Start: 2024-03-07 | End: 2024-03-07 | Stop reason: HOSPADM

## 2024-03-07 RX ORDER — CLINDAMYCIN PHOSPHATE 900 MG/50ML
900 INJECTION, SOLUTION INTRAVENOUS ONCE
Status: COMPLETED | OUTPATIENT
Start: 2024-03-07 | End: 2024-03-07

## 2024-03-07 RX ORDER — FAMOTIDINE 10 MG/ML
20 INJECTION, SOLUTION INTRAVENOUS ONCE
Status: COMPLETED | OUTPATIENT
Start: 2024-03-07 | End: 2024-03-07

## 2024-03-07 RX ORDER — FENTANYL CITRATE 50 UG/ML
INJECTION, SOLUTION INTRAMUSCULAR; INTRAVENOUS AS NEEDED
Status: DISCONTINUED | OUTPATIENT
Start: 2024-03-07 | End: 2024-03-07 | Stop reason: SURG

## 2024-03-07 RX ORDER — ONDANSETRON 2 MG/ML
INJECTION INTRAMUSCULAR; INTRAVENOUS AS NEEDED
Status: DISCONTINUED | OUTPATIENT
Start: 2024-03-07 | End: 2024-03-07 | Stop reason: SURG

## 2024-03-07 RX ORDER — ONDANSETRON 4 MG/1
4 TABLET, ORALLY DISINTEGRATING ORAL ONCE AS NEEDED
Status: DISCONTINUED | OUTPATIENT
Start: 2024-03-07 | End: 2024-03-07 | Stop reason: HOSPADM

## 2024-03-07 RX ORDER — ROCURONIUM BROMIDE 10 MG/ML
INJECTION, SOLUTION INTRAVENOUS AS NEEDED
Status: DISCONTINUED | OUTPATIENT
Start: 2024-03-07 | End: 2024-03-07 | Stop reason: SURG

## 2024-03-07 RX ORDER — DEXAMETHASONE SODIUM PHOSPHATE 4 MG/ML
INJECTION, SOLUTION INTRA-ARTICULAR; INTRALESIONAL; INTRAMUSCULAR; INTRAVENOUS; SOFT TISSUE AS NEEDED
Status: DISCONTINUED | OUTPATIENT
Start: 2024-03-07 | End: 2024-03-07 | Stop reason: SURG

## 2024-03-07 RX ORDER — HYDROCODONE BITARTRATE AND ACETAMINOPHEN 5; 325 MG/1; MG/1
1 TABLET ORAL ONCE AS NEEDED
Status: DISCONTINUED | OUTPATIENT
Start: 2024-03-07 | End: 2024-03-07 | Stop reason: HOSPADM

## 2024-03-07 RX ORDER — SODIUM CHLORIDE 0.9 % (FLUSH) 0.9 %
3 SYRINGE (ML) INJECTION EVERY 12 HOURS SCHEDULED
Status: DISCONTINUED | OUTPATIENT
Start: 2024-03-07 | End: 2024-03-07 | Stop reason: HOSPADM

## 2024-03-07 RX ORDER — LABETALOL HYDROCHLORIDE 5 MG/ML
5 INJECTION, SOLUTION INTRAVENOUS
Status: DISCONTINUED | OUTPATIENT
Start: 2024-03-07 | End: 2024-03-07 | Stop reason: HOSPADM

## 2024-03-07 RX ORDER — EPHEDRINE SULFATE 50 MG/ML
5 INJECTION, SOLUTION INTRAVENOUS ONCE AS NEEDED
Status: DISCONTINUED | OUTPATIENT
Start: 2024-03-07 | End: 2024-03-07 | Stop reason: HOSPADM

## 2024-03-07 RX ORDER — LIDOCAINE HYDROCHLORIDE 20 MG/ML
INJECTION, SOLUTION INFILTRATION; PERINEURAL AS NEEDED
Status: DISCONTINUED | OUTPATIENT
Start: 2024-03-07 | End: 2024-03-07 | Stop reason: SURG

## 2024-03-07 RX ORDER — OXYCODONE AND ACETAMINOPHEN 7.5; 325 MG/1; MG/1
1 TABLET ORAL EVERY 4 HOURS PRN
Status: DISCONTINUED | OUTPATIENT
Start: 2024-03-07 | End: 2024-03-07 | Stop reason: HOSPADM

## 2024-03-07 RX ORDER — HYDRALAZINE HYDROCHLORIDE 20 MG/ML
5 INJECTION INTRAMUSCULAR; INTRAVENOUS
Status: DISCONTINUED | OUTPATIENT
Start: 2024-03-07 | End: 2024-03-07 | Stop reason: HOSPADM

## 2024-03-07 RX ORDER — SODIUM CHLORIDE 0.9 % (FLUSH) 0.9 %
10 SYRINGE (ML) INJECTION AS NEEDED
Status: DISCONTINUED | OUTPATIENT
Start: 2024-03-07 | End: 2024-03-07 | Stop reason: HOSPADM

## 2024-03-07 RX ORDER — LIDOCAINE HYDROCHLORIDE 10 MG/ML
0.5 INJECTION, SOLUTION INFILTRATION; PERINEURAL ONCE AS NEEDED
Status: DISCONTINUED | OUTPATIENT
Start: 2024-03-07 | End: 2024-03-07 | Stop reason: HOSPADM

## 2024-03-07 RX ORDER — PROMETHAZINE HYDROCHLORIDE 25 MG/1
25 TABLET ORAL ONCE AS NEEDED
Status: DISCONTINUED | OUTPATIENT
Start: 2024-03-07 | End: 2024-03-07 | Stop reason: HOSPADM

## 2024-03-07 RX ORDER — HYDROCODONE BITARTRATE AND ACETAMINOPHEN 5; 325 MG/1; MG/1
1-2 TABLET ORAL EVERY 6 HOURS PRN
Qty: 24 TABLET | Refills: 0 | Status: SHIPPED | OUTPATIENT
Start: 2024-03-07

## 2024-03-07 RX ORDER — DROPERIDOL 2.5 MG/ML
0.62 INJECTION, SOLUTION INTRAMUSCULAR; INTRAVENOUS
Status: DISCONTINUED | OUTPATIENT
Start: 2024-03-07 | End: 2024-03-07 | Stop reason: HOSPADM

## 2024-03-07 RX ORDER — KETOROLAC TROMETHAMINE 30 MG/ML
INJECTION, SOLUTION INTRAMUSCULAR; INTRAVENOUS AS NEEDED
Status: DISCONTINUED | OUTPATIENT
Start: 2024-03-07 | End: 2024-03-07 | Stop reason: SURG

## 2024-03-07 RX ORDER — LIDOCAINE 50 MG/G
1 OINTMENT TOPICAL EVERY 4 HOURS PRN
Qty: 35.44 G | Refills: 4 | Status: SHIPPED | OUTPATIENT
Start: 2024-03-07 | End: 2024-04-06

## 2024-03-07 RX ORDER — NALOXONE HCL 0.4 MG/ML
0.2 VIAL (ML) INJECTION AS NEEDED
Status: DISCONTINUED | OUTPATIENT
Start: 2024-03-07 | End: 2024-03-07 | Stop reason: HOSPADM

## 2024-03-07 RX ORDER — SODIUM CHLORIDE 0.9 % (FLUSH) 0.9 %
3-10 SYRINGE (ML) INJECTION AS NEEDED
Status: DISCONTINUED | OUTPATIENT
Start: 2024-03-07 | End: 2024-03-07 | Stop reason: HOSPADM

## 2024-03-07 RX ORDER — DIPHENHYDRAMINE HYDROCHLORIDE 50 MG/ML
12.5 INJECTION INTRAMUSCULAR; INTRAVENOUS
Status: DISCONTINUED | OUTPATIENT
Start: 2024-03-07 | End: 2024-03-07 | Stop reason: HOSPADM

## 2024-03-07 RX ORDER — IPRATROPIUM BROMIDE AND ALBUTEROL SULFATE 2.5; .5 MG/3ML; MG/3ML
3 SOLUTION RESPIRATORY (INHALATION) ONCE AS NEEDED
Status: DISCONTINUED | OUTPATIENT
Start: 2024-03-07 | End: 2024-03-07 | Stop reason: HOSPADM

## 2024-03-07 RX ORDER — ONDANSETRON 2 MG/ML
4 INJECTION INTRAMUSCULAR; INTRAVENOUS ONCE AS NEEDED
Status: DISCONTINUED | OUTPATIENT
Start: 2024-03-07 | End: 2024-03-07 | Stop reason: HOSPADM

## 2024-03-07 RX ORDER — FLUMAZENIL 0.1 MG/ML
0.2 INJECTION INTRAVENOUS AS NEEDED
Status: DISCONTINUED | OUTPATIENT
Start: 2024-03-07 | End: 2024-03-07 | Stop reason: HOSPADM

## 2024-03-07 RX ORDER — POLYETHYLENE GLYCOL 3350 17 G/17G
17 POWDER, FOR SOLUTION ORAL 2 TIMES DAILY
Start: 2024-03-07

## 2024-03-07 RX ORDER — SODIUM CHLORIDE 9 MG/ML
40 INJECTION, SOLUTION INTRAVENOUS AS NEEDED
Status: DISCONTINUED | OUTPATIENT
Start: 2024-03-07 | End: 2024-03-07 | Stop reason: HOSPADM

## 2024-03-07 RX ORDER — PROPOFOL 10 MG/ML
VIAL (ML) INTRAVENOUS AS NEEDED
Status: DISCONTINUED | OUTPATIENT
Start: 2024-03-07 | End: 2024-03-07 | Stop reason: SURG

## 2024-03-07 RX ORDER — PROMETHAZINE HYDROCHLORIDE 25 MG/1
25 SUPPOSITORY RECTAL ONCE AS NEEDED
Status: DISCONTINUED | OUTPATIENT
Start: 2024-03-07 | End: 2024-03-07 | Stop reason: HOSPADM

## 2024-03-07 RX ADMIN — ONDANSETRON 4 MG: 2 INJECTION INTRAMUSCULAR; INTRAVENOUS at 08:47

## 2024-03-07 RX ADMIN — PROPOFOL 150 MG: 10 INJECTION, EMULSION INTRAVENOUS at 08:05

## 2024-03-07 RX ADMIN — SODIUM CHLORIDE, POTASSIUM CHLORIDE, SODIUM LACTATE AND CALCIUM CHLORIDE 9 ML/HR: 600; 310; 30; 20 INJECTION, SOLUTION INTRAVENOUS at 06:59

## 2024-03-07 RX ADMIN — CLINDAMYCIN PHOSPHATE 900 MG: 900 INJECTION, SOLUTION INTRAVENOUS at 07:54

## 2024-03-07 RX ADMIN — SUGAMMADEX 200 MG: 100 INJECTION, SOLUTION INTRAVENOUS at 08:47

## 2024-03-07 RX ADMIN — FENTANYL CITRATE 50 MCG: 50 INJECTION, SOLUTION INTRAMUSCULAR; INTRAVENOUS at 08:04

## 2024-03-07 RX ADMIN — KETOROLAC TROMETHAMINE 15 MG: 30 INJECTION, SOLUTION INTRAMUSCULAR at 08:45

## 2024-03-07 RX ADMIN — AZTREONAM 2 G: 2 INJECTION, POWDER, LYOPHILIZED, FOR SOLUTION INTRAMUSCULAR; INTRAVENOUS at 07:54

## 2024-03-07 RX ADMIN — PROPOFOL 50 MG: 10 INJECTION, EMULSION INTRAVENOUS at 08:06

## 2024-03-07 RX ADMIN — LIDOCAINE HYDROCHLORIDE 100 MG: 20 INJECTION, SOLUTION INFILTRATION; PERINEURAL at 08:05

## 2024-03-07 RX ADMIN — PROPOFOL 50 MG: 10 INJECTION, EMULSION INTRAVENOUS at 08:25

## 2024-03-07 RX ADMIN — FAMOTIDINE 20 MG: 10 INJECTION INTRAVENOUS at 06:59

## 2024-03-07 RX ADMIN — ROCURONIUM BROMIDE 50 MG: 10 INJECTION, SOLUTION INTRAVENOUS at 08:05

## 2024-03-07 RX ADMIN — DEXAMETHASONE SODIUM PHOSPHATE 6 MG: 4 INJECTION, SOLUTION INTRAMUSCULAR; INTRAVENOUS at 08:15

## 2024-03-07 NOTE — ANESTHESIA PREPROCEDURE EVALUATION
Anesthesia Evaluation     Patient summary reviewed                Airway   Mallampati: II  Dental    (+) poor dentition    Comment: Risk of dental injury discussed with patient      Pulmonary    (+) a smoker Current, Smoked day of surgery, cigarettes,sleep apnea on CPAP, decreased breath sounds  Cardiovascular     ECG reviewed  PT is on anticoagulation therapy  Patient on routine beta blocker and Beta blocker given within 24 hours of surgery  Rhythm: irregular    (+) hypertension, past MI , CAD, cardiac stents more than 12 months ago , dysrhythmias Atrial Fib, hyperlipidemia      Neuro/Psych  (+) numbness, psychiatric history Depression and Anxiety  GI/Hepatic/Renal/Endo    (+) obesity, GERD, renal disease- CRI, thyroid problem hypothyroidism    Musculoskeletal     Abdominal   (+) obese   Substance History      OB/GYN          Other      history of cancer                Anesthesia Plan    ASA 3     general       Anesthetic plan, risks, benefits, and alternatives have been provided, discussed and informed consent has been obtained with: patient.    CODE STATUS:

## 2024-03-07 NOTE — ANESTHESIA POSTPROCEDURE EVALUATION
"Patient: Tony Max    Procedure Summary       Date: 03/07/24 Room / Location: [facility] OR [address] [place] [address] MAIN OR    Anesthesia Start: 0759 Anesthesia Stop: 0902    Procedure: excision of anal mass (Rectum) Diagnosis:       Skin mass      (Skin mass [R22.9])    Surgeons: Dionna Rees MD Provider: Katie Dickson MD    Anesthesia Type: MAC ASA Status: 3            Anesthesia Type: MAC    Vitals  Vitals Value Taken Time   /94 03/07/24 0930   Temp 36.3 °C (97.3 °F) 03/07/24 0930   Pulse 82 03/07/24 0938   Resp 16 03/07/24 0930   SpO2 96 % 03/07/24 0938   Vitals shown include unfiled device data.        Post Anesthesia Care and Evaluation    Patient location during evaluation: PHASE II  Patient participation: complete - patient participated  Level of consciousness: awake  Pain management: adequate    Airway patency: patent  Anesthetic complications: No anesthetic complications    Cardiovascular status: acceptable  Respiratory status: acceptable  Hydration status: acceptable    Comments: /92   Pulse 64   Temp 36.3 °C (97.3 °F)   Resp 16   Ht 185.4 cm (73\")   Wt 130 kg (286 lb 13.1 oz)   SpO2 97%   BMI 37.84 kg/m²         "

## 2024-03-07 NOTE — H&P
"The patient was noted to have a perianal abnormality by his primary care provider and was referred to this clinic. His most recent colonoscopy was performed by Dr. Mo MD, on 05/30/2023, which was significant for several polyps with a return to happen in 3 years. The patient is also on Eliquis.     The patient can feel a nodule on his rectal area on palpation which is causing him irritation. This nodule is not hard. He has intermittent blood on wiping, and he is uncertain if the source of bleeding is from this nodule or a hemorrhoid. He denies any trouble with bowel movements. He has a bowel movement daily without requirement of medication for this. He has diarrhea occasionally.  He had diarrhea for a couple of days recently and his rectal area is \"raw.\"     The patient takes Eliquis, simvastatin, metoprolol, simvastatin, thyroid medication, lorazepam, BuSpar, aspirin, and folic acid. His last cardiac follow-up with Dr. No was a couple of weeks ago with a stress test and echocardiogram obtained at that visit.     He is an active smoker.     He has an abnormality in his kidney which is being monitored.     The patient has a history of appendectomy, cholecystectomy, knee surgery, and a prostate procedure to treat an enlarged prostate. He had 2 small nodes in his bladder that were removed through the cancer center approximately 4 years ago. He has undergone cataract surgery.     Medical History        Past Medical History:   Diagnosis Date    Actinic keratosis      Allergic rhinitis      Anesthesia complication       PER PT, DURING A SURGERY WAS TOLD HIS BREATHING CAPICITY WAS LOW    Atrial fibrillation      BCC (basal cell carcinoma) 02/2020     OF FACE    BCC (basal cell carcinoma) 03/2019     LEFT FOREARM    Benign essential hypertension      Bladder cancer 2016     LATERAL WALL, G1aTa, S/P TURBT    BPH (benign prostatic hyperplasia)       FOLLOWED BY DR. RAUL LONDONO    BPPV (benign paroxysmal positional " vertigo) 09/23/2019     SEEN AT [facility] ER    CAD (coronary artery disease)      Cervical radiculopathy      Chronic anticoagulation 08/21/2018    CKD (chronic kidney disease)       STAGE 3    Colon polyps       FOLLOWED BY DR. COLTON BUENROSTRO    Cyst of right kidney 02/2020    Depression      Epididymitis 09/06/2023     SEEN AT     Erectile dysfunction 11/11/2022    JAIDA (generalized anxiety disorder)      Gilbert's syndrome 03/07/2022    Glaucoma       BILATERAL    Gross hematuria 05/2022    HLD (hyperlipidemia)      Hyperbilirubinemia 09/2019    Hypothyroidism, acquired      IBS (irritable bowel syndrome)      Kidney stones      Lumbar strain 06/16/2018     SEEN AT     Lung nodule      Mass of anus      Neuropathy      Non-ST elevated myocardial infarction (non-STEMI) 05/15/2011     PCI X2, ADMITTED TO [facility]    Ocular hypertension, bilateral      MIMI (obstructive sleep apnea)       C-PAP    Other bursal cyst, right hand 07/2017    Panic disorder      Rectal mass 01/2024    Right epididymitis 04/06/2021     SEEN AT     Seborrheic keratosis      Urge incontinence      Venous insufficiency      Vitamin B 12 deficiency      Vitreous degeneration of both eyes              Surgical History         Past Surgical History:   Procedure Laterality Date    APPENDECTOMY N/A 1963    CHOLECYSTECTOMY N/A 2004     LAPAROSCOPIC    COLONOSCOPY N/A 11/18/2005     ENTIRE COLON WNL, DR.RAYMOND CHOI AT [facility]    COLONOSCOPY N/A 05/30/2023     5 TUBULOVILLOUS ADENOMA POLYPS IN ASCENDING, 2 TUBULOVILLOUS ADENOMA POLYPS IN TRANSVERSE, 4 MM TUBULAR ADENOMA POLYP IN DESCENDING, 4 MM BENIGN POLYP IN RECTUM, RESCOPE IN 3 YRS, DR. COLTON BUENROSTRO AT [facility]    COLONOSCOPY N/A 2019    CORONARY ANGIOPLASTY WITH STENT PLACEMENT Left 05/14/2011     ION ANUJA X2 TO MID 1ST DIAGONAL BRANCH, DR.VIPUL MARINA AT [facility]    KIDNEY STONE SURGERY N/A 2002    KNEE SURGERY Right 1975     TUMOR EXCISION    PROSTATE SURGERY N/A 03/19/2019     TRANSURETHRAL RESECTION OF  PROSTATE WITH GREEN LIGHT LASER, DR. RAUL LONDONO AT [place]    SKIN BIOPSY Left 08/19/2018     LEFT PROXIMAL FOREARM, (+) BCC, DR. MARIBELL HERRERA    SKIN BIOPSY N/A 08/19/2018     NASAL ROOT, PATH: ANGIOFIBROMA, LEFT ANTERIOR AXILLA, PATH: VERRUCA,  DR. MARIBELL HERRERA    TONSILLECTOMY Bilateral 1961    TRANSURETHRAL RESECTION OF BLADDER TUMOR N/A 11/21/2016     Procedure: CYSTOSCOPY TRANSURETHRAL RESECTION OF BLADDER TUMOR;  Surgeon: Piotr Villegas MD;  Location: [facility];  Service:     VASECTOMY N/A 1982            Social History:   reports that he has been smoking cigarettes. He has a 60.00 pack-year smoking history. He has been exposed to tobacco smoke. He has never used smokeless tobacco. He reports that he does not drink alcohol and does not use drugs.        Marriage status:            Family History   Problem Relation Age of Onset    Hyperlipidemia Mother      Anxiety disorder Mother      Depression Mother      Diabetes Mother      Heart disease Mother      Hypertension Mother      Cancer Father      Prostate cancer Father      Cancer Sister      Depression Sister      Thyroid disease Sister      Esophageal cancer Sister      Cancer Sister      Breast cancer Sister      Malig Hyperthermia Neg Hx             Current Medications      Current Outpatient Medications:     apixaban (ELIQUIS) 5 MG tablet tablet, Take 1 tablet by mouth 2 (Two) Times a Day. PT IS HOLDING FOR 2 DAYS BEFORE SURGERY PER CARDIOLOGY, Disp: , Rfl:     aspirin 81 MG EC tablet, Take 1 tablet by mouth Daily. INSTRUCTED PT TO FOLLOW MD INSTRUCTIONS REGARDING SURGERY, Disp: , Rfl:     busPIRone (BUSPAR) 10 MG tablet, Take 1 tablet by mouth Every 12 (Twelve) Hours., Disp: , Rfl:     finasteride (PROSCAR) 5 MG tablet, Take 1 tablet by mouth Daily., Disp: , Rfl:     folic acid (FOLVITE) 1 MG tablet, Take 1 tablet by mouth Daily., Disp: 90 tablet, Rfl: 3    levothyroxine (SYNTHROID, LEVOTHROID) 150 MCG tablet, Take 1 tablet by  mouth Daily., Disp: 90 tablet, Rfl: 3    LORazepam (ATIVAN) 1 MG tablet, Take 1 tablet by mouth 3 times a day., Disp: , Rfl:     metoprolol succinate XL (TOPROL-XL) 25 MG 24 hr tablet, Take 1 tablet by mouth 2 (Two) Times a Day., Disp: , Rfl:     montelukast (SINGULAIR) 10 MG tablet, Take 1 tablet by mouth Every Evening., Disp: 90 tablet, Rfl: 3    nitroglycerin (NITROSTAT) 0.4 MG SL tablet, Place 1 tablet under the tongue Every 5 (Five) Minutes As Needed., Disp: , Rfl:     simvastatin (ZOCOR) 20 MG tablet, Take 1 tablet by mouth Every Night., Disp: , Rfl:     tadalafil (CIALIS) 5 MG tablet, Take 1 tablet by mouth Daily As Needed for Erectile Dysfunction. 60 minutes prior to sexual activity/HOLD FOR 48 HOUR BEFORE SURGERY, Disp: , Rfl:      Current Facility-Administered Medications:     cyanocobalamin injection 1,000 mcg, 1,000 mcg, Intramuscular, Q28 Days, Lane Puri MD, 1,000 mcg at 12/14/23 1339    cyanocobalamin injection 1,000 mcg, 1,000 mcg, Intramuscular, Q7 Days, Lane Puri MD, 1,000 mcg at 01/24/24 1442        Allergy  Pristiq [desvenlafaxine], Prozac [fluoxetine], Viibryd [vilazodone hcl], Zoloft [sertraline], Keflex [cephalexin], and Augmentin [amoxicillin-pot clavulanate]     Review of Systems   Constitutional: Negative for decreased appetite and weight gain.   HENT:  Negative for congestion, hearing loss and hoarse voice.    Eyes:  Negative for blurred vision, discharge and visual disturbance.   Cardiovascular:  Negative for chest pain, cyanosis and leg swelling.   Respiratory:  Positive for shortness of breath. Negative for cough, sleep disturbances due to breathing and snoring.    Endocrine: Positive for polyuria. Negative for cold intolerance and heat intolerance.   Hematologic/Lymphatic: Does not bruise/bleed easily.   Skin:  Negative for itching, poor wound healing and skin cancer.   Musculoskeletal:  Negative for arthritis, back pain, joint pain and joint swelling.   Gastrointestinal:   "Positive for diarrhea. Negative for abdominal pain, change in bowel habit, bowel incontinence and constipation.   Genitourinary:  Positive for frequency. Negative for bladder incontinence, dysuria and hematuria.   Neurological:  Positive for disturbances in coordination and light-headedness. Negative for brief paralysis, excessive daytime sleepiness, dizziness, focal weakness, headaches and weakness.   Psychiatric/Behavioral:  Negative for altered mental status and hallucinations. The patient is nervous/anxious. The patient does not have insomnia.    Allergic/Immunologic: Negative for HIV exposure and persistent infections.   All other systems reviewed and are negative.    /97 (BP Location: Right arm, Patient Position: Lying)   Pulse 69   Temp 97.6 °F (36.4 °C) (Oral)   Resp 18   Ht 185.4 cm (73\")   Wt 130 kg (286 lb 13.1 oz)   SpO2 97%   BMI 37.84 kg/m² '     Physical Exam  Genitourinary:     Comments: Perianal anterior, he has a 3 cm nodule that is mobile that appears to be anal skin cancer. It is about 4 cm from the anal verge. Patient has good tone on digital rectal exam.        Review of Medical Record:  I reviewed medical records as detailed in HPI.      Assessment:  1. 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 This mass will be sent for pathology and further treatment can be determined at that time. I described the typical surgical time, postop recovery including pain management, and restrictions. I discussed with patient risks, benefits, and alternatives. The patient had an opportunity to ask questions, and all were answered. Patient understands and wishes to proceed with the procedure. He will need to discontinue Eliquis for a couple of days. He will take a stool softener for a few weeks after this procedure. He will use lidocaine jelly to numb the affected area.  "

## 2024-03-07 NOTE — ANESTHESIA PROCEDURE NOTES
Airway  Urgency: elective    Date/Time: 3/7/2024 8:06 AM  Airway not difficult    General Information and Staff    Patient location during procedure: OR  Anesthesiologist: Katie Dickson MD  CRNA/CAA: Nii Andrew CRNA    Indications and Patient Condition  Indications for airway management: airway protection    Preoxygenated: yes  MILS maintained throughout  Mask difficulty assessment: 2 - vent by mask + OA or adjuvant +/- NMBA    Final Airway Details  Final airway type: endotracheal airway      Successful airway: ETT  Cuffed: yes   Successful intubation technique: video laryngoscopy  Endotracheal tube insertion site: oral  Blade: CMAC  Blade size: D  ETT size (mm): 7.5  Cormack-Lehane Classification: grade I - full view of glottis  Measured from: teeth  ETT/EBT  to teeth (cm): 23  Number of attempts at approach: 1  Assessment: lips, teeth, and gum same as pre-op and atraumatic intubation

## 2024-03-07 NOTE — DISCHARGE INSTRUCTIONS
Dr. Dionna Rees  [address]  [place] [id number] [phone]      Discharge Instructions for excision of anal mass      Go home, rest and take it easy today; however, you should get up and move about several times today to reduce the risk of developing a clot in your legs.      You may experience some dizziness or memory loss from the anesthesia.  This may last for the next 24 hours.  Someone should plan on staying with you for the first 24 hours for your safety.    Do not make any important legal decisions or sign any legal papers for the next 24 hours.      Eat and drink lightly today.  Start off with liquids, jello, soup, crackers or other bland foods at first. Please drink plenty of fluids. You may advance your diet tomorrow as tolerated as long as you do not experience any nausea or vomiting.     Some patients will have packing in their rectum.  It should come out will your first bowel movement.  You may remove it sooner yourself if it bothers you.  If it comes out on its own before your first bowel movement, do not worry about it.  It does not need to be replaced.        Bleeding and drainage are to be expected and may persist for as long as 2-4 weeks.  Bleeding may occur with your bowel movements as well.  Wear a cotton liner such as a Kotex pad or a panty liner inside your underwear to protect your clothing.       You have received a prescription for a narcotic pain medicine, as you will have pain following surgery.   You will not be totally pain free, but your pain medicine should make the pain tolerable.  Please take your pain medicine as prescribed and always take your pills with food to prevent nausea. Your pain may persist for 1-3 weeks. If you are having severe pain that cannot be controlled by the pain medicine, please contact me.  Typically, patients with anal fissures will have less pain than those with hemorrhoids.      The goal is for your bowel movements to be soft which will  help to minimize pain.  The pain medicine used to keep your comfortable may also cause some constipation so I recommend the following:    Miralax (17 grams)--1 capfull every day starting the day after surgery.    Keep taking fiber everyday (Citrucel, Metamucil, or Fiber-con) as directed.    If you are unable to have a bowel movement by 2 days after surgery, try Milk of Magnesia, Magnesium Citrate, or Colace.  If still unable to have a bowel movement, call the office at [phone]      No driving for 24 hours and for as long as you are taking your prescription pain medicine.  You may resume your activities gradually.       You will need to call the office at [phone] to schedule a follow-up appointment in 10-21 days.    Remember to contact me for any of the following:    Fever > 100.5 degrees  Severe pain that cannot be controlled by taking your pain pills  Severe nausea or vomiting   Significant bleeding > 1/4 cup  Any other questions or concerns

## 2024-03-08 LAB
LAB AP CASE REPORT: NORMAL
PATH REPORT.FINAL DX SPEC: NORMAL
PATH REPORT.GROSS SPEC: NORMAL

## 2024-03-10 DIAGNOSIS — J30.2 SEASONAL ALLERGIES: ICD-10-CM

## 2024-03-11 RX ORDER — MONTELUKAST SODIUM 10 MG/1
10 TABLET ORAL EVERY EVENING
Qty: 90 TABLET | Refills: 1 | Status: SHIPPED | OUTPATIENT
Start: 2024-03-11

## 2024-03-11 NOTE — OP NOTE
Surgeon: Dionna Rees MD    Surgical  Assistant: Jessica Menjivar PA-C , Enrrique Gutiérrez MD resident    Preoperative diagnosis: Skin mass [R22.9]    Post-Op Diagnosis Codes:     * Skin mass [R22.9]    Procedure: excision of anal mass, * Panel 2 does not exist *    Estimated Blood Loss: 100ml    Specimens:   Specimens       ID Source Type Tests Collected By Collected At Frozen?    A Anus Tissue TISSUE PATHOLOGY EXAM   Dionna Rees MD 3/7/24 0835 No    Description: anterior anal mass 2 short proximal 2 long left lateral           Order Name Source Comment Collection Info Order Time   TISSUE PATHOLOGY EXAM Anus  Collected By: Dionna Rees MD 3/7/2024  8:36 AM     Release to patient   Routine Release            Indication:  Tony Max is a 73 y.o. male who comes in with Skin mass  Patient understands risks, benefits,and alternatives wishes to proceed.      Procedure Details:  The patient was brought to the operating room, SCD's in place, antibiotics infused.  After general anesthesia was achieved, the patient was placed prone on the operating room table.  Buttocks were effaced with tape and he was prepped and draped in sterile fashion.  In the anterior position, approximately 4 cm from the anal verge, was a 2.5 x 2 cm anal mass that appears to be squamous cell carcinoma.  I made an elliptical incision around it trying to get approximately 1 cm circumferentially around and then getting through the subcutaneous tissue, the distal end was just subcutaneous tissue in the anterior end.  Towards the anus, it started getting close and I had to take it off the external sphincter muscle.  I did all of this just using the sharp scalpel, using a #15 blade.  I marked the anterior anal mass, 2 short proximal and 2 long left lateral and sent it off as specimen.  I then made sure there was good hemostasis using electrocautery.  We closed the incision in layers using 2-0 Vicryl, closed the deep subcutaneous layer  in an interrupted fashion using 2-0 Vicryl and then deep dermal using 2-0 Vicryl in an interrupted fashion and then horizontal mattress using 2-0 Vicryl.  All instrument, lap counts, needle counts were correct.  The patient was stable throughout the entire case and was taken to recovery.         Assistant: Jessica Menjivar PA-C was responsible for performing the following activities: Retraction, Suction, Irrigation, Suturing, Closing and Placing Dressing and their skilled assistance was necessary for the success of this case

## 2024-03-16 ENCOUNTER — HOSPITAL ENCOUNTER (EMERGENCY)
Facility: HOSPITAL | Age: 74
Discharge: HOME | End: 2024-03-16
Attending: STUDENT IN AN ORGANIZED HEALTH CARE EDUCATION/TRAINING PROGRAM
Payer: MEDICARE

## 2024-03-16 VITALS
RESPIRATION RATE: 16 BRPM | OXYGEN SATURATION: 96 % | HEIGHT: 73 IN | SYSTOLIC BLOOD PRESSURE: 180 MMHG | BODY MASS INDEX: 37.98 KG/M2 | HEART RATE: 90 BPM | TEMPERATURE: 97.7 F | DIASTOLIC BLOOD PRESSURE: 109 MMHG | WEIGHT: 286.6 LBS

## 2024-03-16 DIAGNOSIS — K62.5 RECTAL BLEEDING: Primary | ICD-10-CM

## 2024-03-16 LAB
BASOPHILS # BLD AUTO: 0 10*3/MM3 (ref 0–0.2)
BASOPHILS NFR BLD AUTO: 0 % (ref 0–1.5)
DEPRECATED RDW RBC AUTO: 47.6 FL (ref 37–54)
EOSINOPHIL # BLD AUTO: 0.02 10*3/MM3 (ref 0–0.4)
EOSINOPHIL NFR BLD AUTO: 0.2 % (ref 0.3–6.2)
ERYTHROCYTE [DISTWIDTH] IN BLOOD BY AUTOMATED COUNT: 13 % (ref 12.3–15.4)
HCT VFR BLD AUTO: 47.3 % (ref 37.5–51)
HGB BLD-MCNC: 16.4 G/DL (ref 13–17.7)
IMM GRANULOCYTES # BLD AUTO: 0.02 10*3/MM3 (ref 0–0.05)
IMM GRANULOCYTES NFR BLD AUTO: 0.2 % (ref 0–0.5)
LYMPHOCYTES # BLD AUTO: 3.8 10*3/MM3 (ref 0.7–3.1)
LYMPHOCYTES NFR BLD AUTO: 34.5 % (ref 19.6–45.3)
MCH RBC QN AUTO: 33.8 PG (ref 26.6–33)
MCHC RBC AUTO-ENTMCNC: 34.7 G/DL (ref 31.5–35.7)
MCV RBC AUTO: 97.5 FL (ref 79–97)
MONOCYTES # BLD AUTO: 0.81 10*3/MM3 (ref 0.1–0.9)
MONOCYTES NFR BLD AUTO: 7.4 % (ref 5–12)
NEUTROPHILS NFR BLD AUTO: 57.7 % (ref 42.7–76)
NEUTROPHILS NFR BLD AUTO: 6.35 10*3/MM3 (ref 1.7–7)
PLATELET # BLD AUTO: 138 10*3/MM3 (ref 140–450)
PMV BLD AUTO: 11.9 FL (ref 6–12)
RBC # BLD AUTO: 4.85 10*6/MM3 (ref 4.14–5.8)
WBC NRBC COR # BLD AUTO: 11 10*3/MM3 (ref 3.4–10.8)

## 2024-03-16 PROCEDURE — 99283 EMERGENCY DEPT VISIT LOW MDM: CPT

## 2024-03-16 PROCEDURE — 85025 COMPLETE CBC W/AUTO DIFF WBC: CPT | Performed by: STUDENT IN AN ORGANIZED HEALTH CARE EDUCATION/TRAINING PROGRAM

## 2024-03-16 PROCEDURE — 99283 EMERGENCY DEPT VISIT LOW MDM: CPT | Performed by: STUDENT IN AN ORGANIZED HEALTH CARE EDUCATION/TRAINING PROGRAM

## 2024-03-16 RX ORDER — SODIUM CHLORIDE 0.9 % (FLUSH) 0.9 %
10 SYRINGE (ML) INJECTION AS NEEDED
Status: DISCONTINUED | OUTPATIENT
Start: 2024-03-16 | End: 2024-03-16 | Stop reason: HOSPADM

## 2024-03-17 NOTE — FSED PROVIDER NOTE
Subjective   History of Present Illness  Pt is a 73 y.o. male with PMH as listed who presents for   Chief Complaint   Patient presents with    Rectal Bleeding       Patient is a 73-year-old male who presents for rectal bleeding postoperatively after getting a rectal mass removed several days ago.   He has been going through multiple gauze pads today.  Initially had some slight bleeding on Wednesday none on Thursday and some slight bleeding on Friday yesterday.  Came today for further evaluation management.  Does not have any significant pain, no fever vomiting abdominal pain.  No other complaints at this time.    Review of Systems    Past Medical History:   Diagnosis Date    Actinic keratosis     Allergic rhinitis     Anesthesia complication     PER PT, DURING A SURGERY WAS TOLD HIS BREATHING CAPICITY WAS LOW    Atrial fibrillation     BCC (basal cell carcinoma) 02/2020    OF FACE    BCC (basal cell carcinoma) 03/2019    LEFT FOREARM    Benign essential hypertension     Bladder cancer 2016    LATERAL WALL, G1aTa, S/P TURBT    BPH (benign prostatic hyperplasia)     FOLLOWED BY DR. RAUL LONDONO    BPPV (benign paroxysmal positional vertigo) 09/23/2019    SEEN AT [facility] ER    CAD (coronary artery disease)     Cervical radiculopathy     Chronic anticoagulation 08/21/2018    CKD (chronic kidney disease)     STAGE 3    Colon polyps     FOLLOWED BY DR. COLTON BUENROSTRO    Cyst of right kidney 02/2020    Depression     Epididymitis 09/06/2023    SEEN AT     Erectile dysfunction 11/11/2022    JAIDA (generalized anxiety disorder)     Gilbert's syndrome 03/07/2022    Glaucoma     BILATERAL    Gross hematuria 05/2022    HLD (hyperlipidemia)     Hyperbilirubinemia 09/2019    Hypothyroidism, acquired     IBS (irritable bowel syndrome)     Kidney stones     Lumbar strain 06/16/2018    SEEN AT     Lung nodule     Mass of anus     Neuropathy     Non-ST elevated myocardial infarction (non-STEMI) 05/15/2011    PCI X2, ADMITTED TO [facility]     Ocular hypertension, bilateral     MIMI (obstructive sleep apnea)     C-PAP    Other bursal cyst, right hand 07/2017    Panic disorder     Rectal mass 01/2024    Right epididymitis 04/06/2021    SEEN AT     Seborrheic keratosis     Urge incontinence     Venous insufficiency     Vitamin B 12 deficiency     Vitreous degeneration of both eyes        Allergies   Allergen Reactions    Pristiq [Desvenlafaxine] Other (See Comments)     PT DID NOT LIKE      Prozac [Fluoxetine] Other (See Comments)     PT DID NOT LIKE      Viibryd [Vilazodone Hcl] Other (See Comments)     PT DID NOT LIKE    Zoloft [Sertraline] Other (See Comments)     PT DID NOT LIKE      Keflex [Cephalexin] Rash     ..Beta lactam allergy details  Antibiotic reaction: rash  Age at reaction: adult  Dose to reaction time: days  Reason for antibiotic: other  Epinephrine required for reaction?: no  Tolerated antibiotics: amoxicillin       Augmentin [Amoxicillin-Pot Clavulanate] Diarrhea       Past Surgical History:   Procedure Laterality Date    ANUS SURGERY N/A 3/7/2024    Procedure: excision of anal mass;  Surgeon: Dionna Rees MD;  Location: [facility];  Service: General;  Laterality: N/A;    APPENDECTOMY N/A 1963    CHOLECYSTECTOMY N/A 2004    LAPAROSCOPIC    COLONOSCOPY N/A 11/18/2005    ENTIRE COLON WNL, DR.RAYMOND CHOI AT [facility]    COLONOSCOPY N/A 05/30/2023    5 TUBULOVILLOUS ADENOMA POLYPS IN ASCENDING, 2 TUBULOVILLOUS ADENOMA POLYPS IN TRANSVERSE, 4 MM TUBULAR ADENOMA POLYP IN DESCENDING, 4 MM BENIGN POLYP IN RECTUM, RESCOPE IN 3 YRS, DR. COLTON BUENROSTRO AT [facility]    COLONOSCOPY N/A 2019    CORONARY ANGIOPLASTY WITH STENT PLACEMENT Left 05/14/2011    ION ANUJA X2 TO MID 1ST DIAGONAL BRANCH, DR.VIPUL MARINA AT [facility]    EYE SURGERY      KIDNEY STONE SURGERY N/A 2002    KNEE SURGERY Right 1975    TUMOR EXCISION    PROSTATE SURGERY N/A 03/19/2019    TRANSURETHRAL RESECTION OF PROSTATE WITH GREEN LIGHT LASER, DR. RALU LONDONO AT [place]    SKIN BIOPSY Left  08/19/2018    LEFT PROXIMAL FOREARM, (+) BCC, DR. MARIBELL HERRERA    SKIN BIOPSY N/A 08/19/2018    NASAL ROOT, PATH: ANGIOFIBROMA, LEFT ANTERIOR AXILLA, PATH: VERRUCA,  DR. MARIBELL HERRERA    TONSILLECTOMY Bilateral 1961    TRANSURETHRAL RESECTION OF BLADDER TUMOR N/A 11/21/2016    Procedure: CYSTOSCOPY TRANSURETHRAL RESECTION OF BLADDER TUMOR;  Surgeon: Piotr Villegas MD;  Location: [facility] OR;  Service:     VASECTOMY N/A 1982       Family History   Problem Relation Age of Onset    Hyperlipidemia Mother     Anxiety disorder Mother     Depression Mother     Diabetes Mother     Heart disease Mother     Hypertension Mother     Cancer Father     Prostate cancer Father     Cancer Sister     Depression Sister     Thyroid disease Sister     Esophageal cancer Sister     Cancer Sister     Breast cancer Sister     Malig Hyperthermia Neg Hx        Social History     Socioeconomic History    Marital status:    Tobacco Use    Smoking status: Every Day     Current packs/day: 1.50     Average packs/day: 1.5 packs/day for 40.0 years (60.0 ttl pk-yrs)     Types: Cigarettes     Passive exposure: Past    Smokeless tobacco: Never   Vaping Use    Vaping status: Never Used   Substance and Sexual Activity    Alcohol use: No    Drug use: No    Sexual activity: Defer     Birth control/protection: Vasectomy           Objective   Physical Exam  Constitutional:       Appearance: Normal appearance.   HENT:      Head: Normocephalic and atraumatic.      Mouth/Throat:      Mouth: Mucous membranes are moist.      Pharynx: Oropharynx is clear.   Eyes:      Conjunctiva/sclera: Conjunctivae normal.   Cardiovascular:      Rate and Rhythm: Normal rate.   Pulmonary:      Effort: Pulmonary effort is normal.   Abdominal:      General: Abdomen is flat.   Genitourinary:     Comments: Wound dehiscence to midline at site of surgical incision, hemostatic  Musculoskeletal:      Cervical back: Neck supple.   Skin:     General: Skin is warm and  dry.   Neurological:      Mental Status: He is alert.   Psychiatric:         Mood and Affect: Mood normal.         Procedures           ED Course                                           Medical Decision Making  Differential diagnosis: Rectal bleeding, wound dehiscence, postoperative complication.    Problems Addressed:  Rectal bleeding: complicated acute illness or injury    Amount and/or Complexity of Data Reviewed  Labs: ordered.     Details: Hemoglobin normal greater than 16  Discussion of management or test interpretation with external provider(s): Spoke with Kathy Borden with colorectal surgery who will put in a call to patient's surgeon in order to get him in for an appointment on Monday for reevaluation and further management.  No negation for further intervention at this time, patient to continue packing as needed for bleeding control.  Discussed with her if antibiotics eradicated given wound dehiscence to perineal area, she states not indicated at this time.  Will defer for now.    Risk  Prescription drug management.        Final diagnoses:   Rectal bleeding       ED Disposition  ED Disposition       ED Disposition   Discharge    Condition   Stable    Comment   --               Lane Puri MD  [address]  [facility] [id number] [phone]    Schedule an appointment as soon as possible for a visit in 3 days  For re-evaluation    Dionna Rees MD  [address]  [facility] [id number] [phone]    On 3/18/2024           Medication List      No changes were made to your prescriptions during this visit.

## 2024-03-19 ENCOUNTER — OFFICE VISIT (OUTPATIENT)
Dept: SURGERY | Facility: CLINIC | Age: 74
End: 2024-03-19
Payer: MEDICARE

## 2024-03-19 VITALS
SYSTOLIC BLOOD PRESSURE: 150 MMHG | HEART RATE: 73 BPM | WEIGHT: 277 LBS | HEIGHT: 73 IN | DIASTOLIC BLOOD PRESSURE: 80 MMHG | BODY MASS INDEX: 36.71 KG/M2 | OXYGEN SATURATION: 98 %

## 2024-03-19 DIAGNOSIS — C21.0 ANAL CANCER: Primary | ICD-10-CM

## 2024-03-19 PROCEDURE — 3079F DIAST BP 80-89 MM HG: CPT | Performed by: PHYSICIAN ASSISTANT

## 2024-03-19 PROCEDURE — 99024 POSTOP FOLLOW-UP VISIT: CPT | Performed by: PHYSICIAN ASSISTANT

## 2024-03-19 PROCEDURE — 1160F RVW MEDS BY RX/DR IN RCRD: CPT | Performed by: PHYSICIAN ASSISTANT

## 2024-03-19 PROCEDURE — 1159F MED LIST DOCD IN RCRD: CPT | Performed by: PHYSICIAN ASSISTANT

## 2024-03-19 PROCEDURE — 3077F SYST BP >= 140 MM HG: CPT | Performed by: PHYSICIAN ASSISTANT

## 2024-03-19 NOTE — PROGRESS NOTES
"Tony Max is a 73 y.o. male in for follow up of Anal cancer    Pt is S/P anal mass excision 03/07/2024.   He states he was healing well until he noticed a little bit of blood after a BM 6 days ago.   He denies any bleeding for the following 2 days.  Then 3 days ago, had 2 BMs with more blood.  He presented to the [facility] ED where he was told a suture came loose.  He presents today for further evaluation.     Pt is on Eliquis.  States bleeding is spotty on the gauze and stops shortly after a BM.  Denies any lightheaded/dizziness.     Pt also notes clots when voiding.     /80 (BP Location: Left arm, Patient Position: Sitting, Cuff Size: Large Adult)   Pulse 73   Ht 185.4 cm (73\")   Wt 126 kg (277 lb)   SpO2 98%   BMI 36.55 kg/m²   Body mass index is 36.55 kg/m².      PE:  Physical Exam  Exam conducted with a chaperone present.   Constitutional:       General: He is not in acute distress.     Appearance: He is well-developed.   HENT:      Head: Normocephalic and atraumatic.   Abdominal:      General: There is no distension.      Palpations: Abdomen is soft.   Genitourinary:     Comments: Perianal exam: There is a wound in the anterior position that has opened. There are no signs of infection or current bleeding.   Musculoskeletal:         General: Normal range of motion.   Neurological:      Mental Status: He is alert.   Psychiatric:         Thought Content: Thought content normal.         Assessment:   1. Anal cancer    - S/P anal mass excision 03/07/2024.   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 Pt to monitor bleeding. Wound may bleed when irritated (after a BM) until it is completely healed. Pt instructed to notify our office if the bleeding soaks through gauze, is not able to be controlled, or if he develops lightheadedness/dizziness. Strict ER precautions discussed. Pt expresses understanding.  - " Pt instructed to notify his urologist in regards to his hematuria.   - Follow up in 2 weeks.

## 2024-04-02 ENCOUNTER — OFFICE VISIT (OUTPATIENT)
Dept: SURGERY | Facility: CLINIC | Age: 74
End: 2024-04-02
Payer: MEDICARE

## 2024-04-02 VITALS
OXYGEN SATURATION: 99 % | DIASTOLIC BLOOD PRESSURE: 84 MMHG | HEIGHT: 73 IN | WEIGHT: 279 LBS | BODY MASS INDEX: 36.98 KG/M2 | SYSTOLIC BLOOD PRESSURE: 130 MMHG | HEART RATE: 76 BPM

## 2024-04-02 DIAGNOSIS — C21.0 ANAL CANCER: Primary | ICD-10-CM

## 2024-04-02 NOTE — PROGRESS NOTES
"Tony Max is a 73 y.o. male in for follow up of Anal cancer    Pt is S/P anal mass excision 03/07/2024 and presents today for a post-op evaluation.   Reports resolution of pain, bleeding, and drainage.   Having bowel function without difficulty.   Denies any concerns regarding the healing process.     /84 (BP Location: Left arm, Patient Position: Sitting, Cuff Size: Large Adult)   Pulse 76   Ht 185.4 cm (73\")   Wt 127 kg (279 lb)   SpO2 99%   BMI 36.81 kg/m²   Body mass index is 36.81 kg/m².      PE:   Physical Exam  Exam conducted with a chaperone present.   Constitutional:       General: He is not in acute distress.     Appearance: He is well-developed.   HENT:      Head: Normocephalic and atraumatic.   Abdominal:      General: There is no distension.      Palpations: Abdomen is soft.   Genitourinary:     Comments: Anterior incision measuring approximately 2 cm x 1 cm. Wound bed with pink granulation tissue with some loose sutures  Musculoskeletal:         General: Normal range of motion.   Neurological:      Mental Status: He is alert.   Psychiatric:         Thought Content: Thought content normal.         Assessment:  1. Anal cancer    - S/P anal mass excision 03/07/2024.   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 Continue local wound care.  - Follow up in 2 weeks for wound check  "

## 2024-04-06 ENCOUNTER — HOSPITAL ENCOUNTER (OUTPATIENT)
Facility: HOSPITAL | Age: 74
Discharge: HOME | End: 2024-04-06
Attending: EMERGENCY MEDICINE
Payer: MEDICARE

## 2024-04-06 VITALS
OXYGEN SATURATION: 98 % | HEART RATE: 80 BPM | RESPIRATION RATE: 16 BRPM | WEIGHT: 287 LBS | HEIGHT: 73 IN | DIASTOLIC BLOOD PRESSURE: 108 MMHG | SYSTOLIC BLOOD PRESSURE: 159 MMHG | TEMPERATURE: 98.4 F | BODY MASS INDEX: 38.04 KG/M2

## 2024-04-06 DIAGNOSIS — Z51.89 VISIT FOR WOUND CHECK: Primary | ICD-10-CM

## 2024-04-06 PROCEDURE — 99213 OFFICE O/P EST LOW 20 MIN: CPT | Performed by: PHYSICIAN ASSISTANT

## 2024-04-06 PROCEDURE — G0463 HOSPITAL OUTPT CLINIC VISIT: HCPCS | Performed by: PHYSICIAN ASSISTANT

## 2024-04-06 RX ORDER — TRIAMCINOLONE ACETONIDE 1 MG/G
1 CREAM TOPICAL 3 TIMES DAILY
Qty: 21 G | Refills: 0 | Status: SHIPPED | OUTPATIENT
Start: 2024-04-06 | End: 2024-04-13

## 2024-04-06 RX ORDER — DOXYCYCLINE 100 MG/1
100 CAPSULE ORAL 2 TIMES DAILY
Qty: 14 CAPSULE | Refills: 0 | Status: SHIPPED | OUTPATIENT
Start: 2024-04-06 | End: 2024-04-13

## 2024-04-06 NOTE — DISCHARGE INSTRUCTIONS
Please take the medications as prescribed.  Follow-up with your primary care doctor next week to recheck your wound.

## 2024-04-06 NOTE — FSED PROVIDER NOTE
Subjective   History of Present Illness    Patient reports that he went golfing 3 days ago and noticed a bug bite to his right lower leg 2 days ago.  Spouse used tweezers to remove a dark spot on the wound and since then the wound has gradually increased in size.  Patient denies any fever or bodyaches.  Denies any history of diabetes.  Patient denies any pain or itchiness to the area.  Denies any drainage.    Review of Systems   Constitutional:  Negative for activity change and appetite change.   Eyes:  Negative for pain.   Respiratory:  Negative for shortness of breath.    Gastrointestinal:  Negative for nausea and vomiting.   Musculoskeletal:  Negative for arthralgias.   Skin:  Positive for wound. Negative for color change.   Neurological:  Negative for dizziness.   All other systems reviewed and are negative.      Past Medical History:   Diagnosis Date    Actinic keratosis     Allergic rhinitis     Anesthesia complication     PER PT, DURING A SURGERY WAS TOLD HIS BREATHING CAPICITY WAS LOW    Atrial fibrillation     BCC (basal cell carcinoma) 02/2020    OF FACE    BCC (basal cell carcinoma) 03/2019    LEFT FOREARM    Benign essential hypertension     Bladder cancer 2016    LATERAL WALL, G1aTa, S/P TURBT    BPH (benign prostatic hyperplasia)     FOLLOWED BY DR. RAUL LONDONO    BPPV (benign paroxysmal positional vertigo) 09/23/2019    SEEN AT [facility] ER    CAD (coronary artery disease)     Cervical radiculopathy     Chronic anticoagulation 08/21/2018    CKD (chronic kidney disease)     STAGE 3    Colon polyps     FOLLOWED BY DR. COLTON BUENROSTRO    Cyst of right kidney 02/2020    Depression     Epididymitis 09/06/2023    SEEN AT     Erectile dysfunction 11/11/2022    JAIDA (generalized anxiety disorder)     Gilbert's syndrome 03/07/2022    Glaucoma     BILATERAL    Gross hematuria 05/2022    HLD (hyperlipidemia)     Hyperbilirubinemia 09/2019    Hypothyroidism, acquired     IBS (irritable bowel syndrome)     Kidney stones      Lumbar strain 06/16/2018    SEEN AT     Lung nodule     Mass of anus     Neuropathy     Non-ST elevated myocardial infarction (non-STEMI) 05/15/2011    PCI X2, ADMITTED TO [facility]    Ocular hypertension, bilateral     MIMI (obstructive sleep apnea)     C-PAP    Other bursal cyst, right hand 07/2017    Panic disorder     Rectal mass 01/2024    Right epididymitis 04/06/2021    SEEN AT     Seborrheic keratosis     Urge incontinence     Venous insufficiency     Vitamin B 12 deficiency     Vitreous degeneration of both eyes        Allergies   Allergen Reactions    Pristiq [Desvenlafaxine] Other (See Comments)     PT DID NOT LIKE      Prozac [Fluoxetine] Other (See Comments)     PT DID NOT LIKE      Viibryd [Vilazodone Hcl] Other (See Comments)     PT DID NOT LIKE    Zoloft [Sertraline] Other (See Comments)     PT DID NOT LIKE      Keflex [Cephalexin] Rash     ..Beta lactam allergy details  Antibiotic reaction: rash  Age at reaction: adult  Dose to reaction time: days  Reason for antibiotic: other  Epinephrine required for reaction?: no  Tolerated antibiotics: amoxicillin       Augmentin [Amoxicillin-Pot Clavulanate] Diarrhea       Past Surgical History:   Procedure Laterality Date    ANUS SURGERY N/A 3/7/2024    Procedure: excision of anal mass;  Surgeon: Dionna Rees MD;  Location: [facility] OR;  Service: General;  Laterality: N/A;    APPENDECTOMY N/A 1963    CHOLECYSTECTOMY N/A 2004    LAPAROSCOPIC    COLONOSCOPY N/A 11/18/2005    ENTIRE COLON WNL, DR.RAYMOND CHOI AT [facility]    COLONOSCOPY N/A 05/30/2023    5 TUBULOVILLOUS ADENOMA POLYPS IN ASCENDING, 2 TUBULOVILLOUS ADENOMA POLYPS IN TRANSVERSE, 4 MM TUBULAR ADENOMA POLYP IN DESCENDING, 4 MM BENIGN POLYP IN RECTUM, RESCOPE IN 3 YRS, DR. COLTON BUENROSTRO AT [facility]    COLONOSCOPY N/A 2019    CORONARY ANGIOPLASTY WITH STENT PLACEMENT Left 05/14/2011    ION ANUJA X2 TO MID 1ST DIAGONAL BRANCH, DR.VIPUL MARINA AT [facility]    EYE SURGERY      KIDNEY STONE SURGERY N/A 2002     KNEE SURGERY Right 1975    TUMOR EXCISION    PROSTATE SURGERY N/A 03/19/2019    TRANSURETHRAL RESECTION OF PROSTATE WITH GREEN LIGHT LASER, DR. RAUL LONDONO AT [facility]    SKIN BIOPSY Left 08/19/2018    LEFT PROXIMAL FOREARM, (+) BCC, DR. MARIBELL HERRERA    SKIN BIOPSY N/A 08/19/2018    NASAL ROOT, PATH: ANGIOFIBROMA, LEFT ANTERIOR AXILLA, PATH: VERRUCA,  DR. MARIBELL HERRERA    TONSILLECTOMY Bilateral 1961    TRANSURETHRAL RESECTION OF BLADDER TUMOR N/A 11/21/2016    Procedure: CYSTOSCOPY TRANSURETHRAL RESECTION OF BLADDER TUMOR;  Surgeon: Piotr Villegas MD;  Location: [facility] OR;  Service:     VASECTOMY N/A 1982       Family History   Problem Relation Age of Onset    Hyperlipidemia Mother     Anxiety disorder Mother     Depression Mother     Diabetes Mother     Heart disease Mother     Hypertension Mother     Cancer Father     Prostate cancer Father     Cancer Sister     Depression Sister     Thyroid disease Sister     Esophageal cancer Sister     Cancer Sister     Breast cancer Sister     Malig Hyperthermia Neg Hx        Social History     Socioeconomic History    Marital status:    Tobacco Use    Smoking status: Every Day     Current packs/day: 1.50     Average packs/day: 1.5 packs/day for 40.0 years (60.0 ttl pk-yrs)     Types: Cigarettes     Passive exposure: Past    Smokeless tobacco: Never   Vaping Use    Vaping status: Never Used   Substance and Sexual Activity    Alcohol use: No    Drug use: No    Sexual activity: Defer     Birth control/protection: Vasectomy           Objective   Physical Exam  Vitals and nursing note reviewed.   Constitutional:       Appearance: Normal appearance. He is normal weight.   HENT:      Head: Normocephalic and atraumatic.      Nose: Nose normal.      Mouth/Throat:      Mouth: Mucous membranes are moist.   Eyes:      Pupils: Pupils are equal, round, and reactive to light.   Cardiovascular:      Rate and Rhythm: Normal rate and regular rhythm.      Pulses: Normal  pulses.      Heart sounds: Normal heart sounds.   Pulmonary:      Effort: Pulmonary effort is normal.      Breath sounds: Normal breath sounds.   Musculoskeletal:         General: Normal range of motion.      Cervical back: Normal range of motion.      Right lower leg: No edema.      Left lower leg: No edema.   Skin:     General: Skin is warm.             Comments: Refer to the red marked area: There is a 3 cm x 2 cm raised indurated wound, no fluctuance, no drainage, no erythema, no pain   Neurological:      General: No focal deficit present.      Mental Status: He is alert.   Psychiatric:         Behavior: Behavior is cooperative.         Procedures           ED Course                                           Medical Decision Making  Problems Addressed:  Visit for wound check: complicated acute illness or injury    Risk  Prescription drug management.        Final diagnoses:   Visit for wound check       ED Disposition  ED Disposition       ED Disposition   Discharge    Condition   Stable    Comment   --               Lane Puri MD  [id number] [id number] [phone]               Medication List        New Prescriptions      doxycycline 100 MG capsule  Commonly known as: MONODOX  Take 1 capsule by mouth 2 (Two) Times a Day for 7 days.     triamcinolone 0.1 % cream  Commonly known as: KENALOG  Apply 1 Application topically to the appropriate area as directed 3 (Three) Times a Day for 7 days.               Where to Get Your Medications        These medications were sent to [address] [facility] - [phone]  - [phone]   [address] [facility] [address]      Phone: [phone]   doxycycline 100 MG capsule  triamcinolone 0.1 % cream

## 2024-05-01 ENCOUNTER — OFFICE VISIT (OUTPATIENT)
Dept: SURGERY | Facility: CLINIC | Age: 74
End: 2024-05-01
Payer: MEDICARE

## 2024-05-01 VITALS
WEIGHT: 279.9 LBS | OXYGEN SATURATION: 98 % | BODY MASS INDEX: 37.1 KG/M2 | HEART RATE: 73 BPM | HEIGHT: 73 IN | SYSTOLIC BLOOD PRESSURE: 130 MMHG | DIASTOLIC BLOOD PRESSURE: 90 MMHG

## 2024-05-01 DIAGNOSIS — C21.0 ANAL CANCER: Primary | ICD-10-CM

## 2024-05-01 PROCEDURE — 1160F RVW MEDS BY RX/DR IN RCRD: CPT | Performed by: PHYSICIAN ASSISTANT

## 2024-05-01 PROCEDURE — 3080F DIAST BP >= 90 MM HG: CPT | Performed by: PHYSICIAN ASSISTANT

## 2024-05-01 PROCEDURE — 99024 POSTOP FOLLOW-UP VISIT: CPT | Performed by: PHYSICIAN ASSISTANT

## 2024-05-01 PROCEDURE — 3075F SYST BP GE 130 - 139MM HG: CPT | Performed by: PHYSICIAN ASSISTANT

## 2024-05-01 PROCEDURE — 1159F MED LIST DOCD IN RCRD: CPT | Performed by: PHYSICIAN ASSISTANT

## 2024-05-01 NOTE — PROGRESS NOTES
"Tony Max is a 73 y.o. male in for follow up of Anal cancer    Pt is S/P anal mass excision 03/07/2024 and presents today for a wound check.  Denies any pain, bleeding, drainage, or concerns with healing.   States it \"does not bother him\".     /90 (BP Location: Left arm, Patient Position: Sitting, Cuff Size: Adult)   Pulse 73   Ht 185.4 cm (73\")   Wt 127 kg (279 lb 14.4 oz)   SpO2 98%   BMI 36.93 kg/m²   Body mass index is 36.93 kg/m².      PE:   Physical Exam  Exam conducted with a chaperone present.   Constitutional:       General: He is not in acute distress.     Appearance: He is well-developed.   HENT:      Head: Normocephalic and atraumatic.   Abdominal:      General: There is no distension.      Palpations: Abdomen is soft.   Genitourinary:     Comments: Perianal exam: Significant interval healing. Anterior incision has fully epithelialized except a small pinpoint area at the distal end of the incision. No surrounding erythema, edema, or purulent drainage.   Musculoskeletal:         General: Normal range of motion.   Neurological:      Mental Status: He is alert.   Psychiatric:         Thought Content: Thought content normal.         Assessment:  1. Anal cancer    - S/P anal mass excision 03/07/2024.   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 Rees in 4 weeks for surveillance.     "

## 2024-06-03 ENCOUNTER — OFFICE VISIT (OUTPATIENT)
Dept: SURGERY | Facility: CLINIC | Age: 74
End: 2024-06-03
Payer: MEDICARE

## 2024-06-03 VITALS
HEART RATE: 78 BPM | BODY MASS INDEX: 37.24 KG/M2 | HEIGHT: 73 IN | SYSTOLIC BLOOD PRESSURE: 150 MMHG | DIASTOLIC BLOOD PRESSURE: 74 MMHG | OXYGEN SATURATION: 96 % | WEIGHT: 281 LBS

## 2024-06-03 DIAGNOSIS — Z85.048 HISTORY OF ANAL CANCER: Primary | ICD-10-CM

## 2024-06-03 PROCEDURE — 46600 DIAGNOSTIC ANOSCOPY SPX: CPT | Performed by: COLON & RECTAL SURGERY

## 2024-06-03 PROCEDURE — 3078F DIAST BP <80 MM HG: CPT | Performed by: COLON & RECTAL SURGERY

## 2024-06-03 PROCEDURE — 1160F RVW MEDS BY RX/DR IN RCRD: CPT | Performed by: COLON & RECTAL SURGERY

## 2024-06-03 PROCEDURE — 99212 OFFICE O/P EST SF 10 MIN: CPT | Performed by: COLON & RECTAL SURGERY

## 2024-06-03 PROCEDURE — 3077F SYST BP >= 140 MM HG: CPT | Performed by: COLON & RECTAL SURGERY

## 2024-06-03 PROCEDURE — 1159F MED LIST DOCD IN RCRD: CPT | Performed by: COLON & RECTAL SURGERY

## 2024-06-03 NOTE — PROGRESS NOTES
"Tony Max is a 73 y.o. male in for follow up of History of anal cancer    History of Present Illness  The patient presents for a surveillance exam. The patient had a T2 anal cancer that was excised. The patient has advanced dementia, is living in an assisted living place.    The patient denies experiencing any rectal bleeding or associated pain. He underwent surgery in 03/2023. He also denies the presence of blood upon wiping.   His father was 92 years old, his maternal grandmother was 96 years old, his grandfather was 86 years old, and his other grandfather was 44 years old and had heart trouble. His other grandmother was in her 80s.       /74 (BP Location: Right arm, Patient Position: Sitting, Cuff Size: Adult)   Pulse 78   Ht 185.4 cm (73\")   Wt 127 kg (281 lb)   SpO2 96%   BMI 37.07 kg/m²   Body mass index is 37.07 kg/m².      PE:   Physical Exam    Physical Exam  Constitutional:       General: He is not in acute distress.     Appearance: He is well-developed.   HENT:      Head: Normocephalic and atraumatic.   Abdominal:      General: There is no distension.      Palpations: Abdomen is soft.   Genitourinary:     Comments: perianal examination reveals a healed area anteriorly in the perianal area. No evidence of disease.     Digital rectal exam reveals no masses, good tone.     Anoscopy performed shows no evidence of disease. He does have a posterior fissure that is old and I do not see any dysplasia.  Musculoskeletal:         General: Normal range of motion.   Neurological:      Mental Status: He is alert.   Psychiatric:         Thought Content: Thought content normal.             Assessment & Plan  1. Surveillance exam.    Follow-up  A follow-up appointment is scheduled for 3 months from now.     1. History of anal cancer           Patient or patient representative verbalized consent for the use of Ambient Listening during the visit with  Dionna Rees MD for chart documentation. 6/12/2024  " 11:03 EDT

## 2024-07-24 ENCOUNTER — OFFICE VISIT (OUTPATIENT)
Dept: FAMILY MEDICINE CLINIC | Facility: CLINIC | Age: 74
End: 2024-07-24
Payer: MEDICARE

## 2024-07-24 VITALS
HEART RATE: 74 BPM | HEIGHT: 73 IN | WEIGHT: 285 LBS | SYSTOLIC BLOOD PRESSURE: 146 MMHG | DIASTOLIC BLOOD PRESSURE: 88 MMHG | BODY MASS INDEX: 37.77 KG/M2 | OXYGEN SATURATION: 97 %

## 2024-07-24 DIAGNOSIS — E78.49 OTHER HYPERLIPIDEMIA: ICD-10-CM

## 2024-07-24 DIAGNOSIS — N18.31 STAGE 3A CHRONIC KIDNEY DISEASE: ICD-10-CM

## 2024-07-24 DIAGNOSIS — E53.8 B12 DEFICIENCY: ICD-10-CM

## 2024-07-24 DIAGNOSIS — J30.2 SEASONAL ALLERGIES: ICD-10-CM

## 2024-07-24 DIAGNOSIS — I25.2 HISTORY OF MI (MYOCARDIAL INFARCTION): ICD-10-CM

## 2024-07-24 DIAGNOSIS — I10 BENIGN ESSENTIAL HYPERTENSION: ICD-10-CM

## 2024-07-24 DIAGNOSIS — E03.9 HYPOTHYROIDISM, ACQUIRED: ICD-10-CM

## 2024-07-24 DIAGNOSIS — R73.01 IFG (IMPAIRED FASTING GLUCOSE): Primary | ICD-10-CM

## 2024-07-24 DIAGNOSIS — C20 RECTAL CARCINOMA: ICD-10-CM

## 2024-07-24 PROBLEM — R22.9 SKIN MASS: Status: RESOLVED | Noted: 2024-02-02 | Resolved: 2024-07-24

## 2024-07-24 PROBLEM — Z12.11 ENCOUNTER FOR SCREENING FOR MALIGNANT NEOPLASM OF COLON: Status: RESOLVED | Noted: 2023-02-03 | Resolved: 2024-07-24

## 2024-07-24 PROCEDURE — 1126F AMNT PAIN NOTED NONE PRSNT: CPT | Performed by: FAMILY MEDICINE

## 2024-07-24 PROCEDURE — 3079F DIAST BP 80-89 MM HG: CPT | Performed by: FAMILY MEDICINE

## 2024-07-24 PROCEDURE — 99214 OFFICE O/P EST MOD 30 MIN: CPT | Performed by: FAMILY MEDICINE

## 2024-07-24 PROCEDURE — G2211 COMPLEX E/M VISIT ADD ON: HCPCS | Performed by: FAMILY MEDICINE

## 2024-07-24 PROCEDURE — 3077F SYST BP >= 140 MM HG: CPT | Performed by: FAMILY MEDICINE

## 2024-07-24 RX ORDER — LEVOTHYROXINE SODIUM 0.15 MG/1
150 TABLET ORAL DAILY
Qty: 90 TABLET | Refills: 2 | Status: SHIPPED | OUTPATIENT
Start: 2024-07-24 | End: 2025-04-20

## 2024-07-24 RX ORDER — ATORVASTATIN CALCIUM 10 MG/1
10 TABLET, FILM COATED ORAL NIGHTLY
Qty: 90 TABLET | Refills: 2 | Status: SHIPPED | OUTPATIENT
Start: 2024-07-24 | End: 2025-04-20

## 2024-07-24 RX ORDER — FOLIC ACID 1 MG/1
1 TABLET ORAL DAILY
Qty: 90 TABLET | Refills: 3 | Status: CANCELLED | OUTPATIENT
Start: 2024-07-24

## 2024-07-24 RX ORDER — MONTELUKAST SODIUM 10 MG/1
10 TABLET ORAL EVERY EVENING
Qty: 90 TABLET | Refills: 2 | Status: SHIPPED | OUTPATIENT
Start: 2024-07-24 | End: 2025-04-20

## 2024-07-24 NOTE — PROGRESS NOTES
"Chief Complaint  Follow-up (6 month), Hyperlipidemia, Hypertension, Hypothyroidism, Stage 3a chronic kidney disease, and IFG    Subjective        Follow-up  Conditions present:  Hyperlipidemia    Hyperlipidemia: Exacerbating diseases: hypothyroidism.   Hyperlipidemia  Exacerbating diseases include hypothyroidism.   Hypertension    Hypothyroidism          The patient is a 73-year-old male who presents for evaluation of multiple medical concerns.    The patient has been under the care of Dr. Rees, with whom he undergoes frequent monitoring of his recently diagnosed rectal cancer.    The patient's recent blood work indicated elevated creatinine kinase levels. He denies experiencing abdominal pain, nausea, or vomiting. He also denies any skin color changes or jaundice.    The patient has successfully lost 14 pounds.    Supplemental Information  He has arthritis in his ankle where he broke it and it has been acting up, a little sore. He is on simvastatin for his cholesterol. He takes it at bedtime. He needs refills of his montelukast. He used to get a B12 injection. He is not taking B12 supplements.   He does not drink alcohol. He is still smoking.           Vital Signs:   Vitals:    07/24/24 1341   BP: 146/88   Pulse: 74   SpO2: 97%   Weight: 129 kg (285 lb)   Height: 185.4 cm (73\")            7/24/2024     1:47 PM   PHQ-2/PHQ-9 Depression Screening   Little Interest or Pleasure in Doing Things 0-->not at all   Feeling Down, Depressed or Hopeless 0-->not at all   PHQ-9: Brief Depression Severity Measure Score 0                 Physical Exam  Vitals reviewed.   Constitutional:       General: He is not in acute distress.     Appearance: He is obese.   Eyes:      General: Lids are normal.      Conjunctiva/sclera: Conjunctivae normal.   Neck:      Vascular: No carotid bruit.      Trachea: No tracheal deviation.   Cardiovascular:      Rate and Rhythm: Normal rate. Rhythm irregularly irregular.      Heart sounds: Normal " heart sounds. No murmur heard.  Pulmonary:      Effort: Pulmonary effort is normal.      Breath sounds: Normal breath sounds.   Skin:     General: Skin is warm and dry.   Neurological:      Mental Status: He is alert. He is not disoriented.   Psychiatric:         Speech: Speech normal.         Behavior: Behavior normal. Behavior is cooperative.          Heart has an irregular heartbeat.       The following data was reviewed by: Lane Puri MD on 07/24/2024:  MicroAlbumin, Urine, Random - Urine, Clean Catch (07/16/2024 09:03)  Hemoglobin A1c (07/16/2024 09:03)  TSH (07/16/2024 09:03)  CK (07/16/2024 09:03)  Lipid Panel (07/16/2024 09:03)  CBC & Differential (07/16/2024 09:03)  Comprehensive Metabolic Panel (07/16/2024 09:03)    Results  Laboratory Studies  White blood cell count is 8.1. Hemoglobin is 15.9. Platelets are 141,000. Glucose is 106. BUN is 18. Creatinine is 1.31. Sodium is 142, potassium is 4.4, chloride is 106, and calcium is 8.8. Proteins are normal. Liver tests are normal. Alkaline phosphatase is slightly elevated. Total cholesterol is 99, triglycerides are 89, HDL cholesterol is 36, LDL is 45. TSH is 2.98. Hemoglobin A1c was 5.9. Creatinine kinase is 435.                Assessment and Plan     Orders Placed This Encounter   Procedures    Comprehensive Metabolic Panel    CK    Lipid Panel With / Chol / HDL Ratio    TSH    Vitamin B12    Methylmalonic Acid, Serum    Folate    CBC & Differential     New Medications Ordered This Visit   Medications    montelukast (SINGULAIR) 10 MG tablet     Sig: Take 1 tablet by mouth Every Evening for 270 days.     Dispense:  90 tablet     Refill:  2    levothyroxine (SYNTHROID, LEVOTHROID) 150 MCG tablet     Sig: Take 1 tablet by mouth Daily for 270 days.     Dispense:  90 tablet     Refill:  2    atorvastatin (LIPITOR) 10 MG tablet     Sig: Take 1 tablet by mouth Every Night for 270 days.     Dispense:  90 tablet     Refill:  2        1. Hyperlipidemia.  The  patient's cholesterol levels are well-managed, however, there is an increase in creatinine kinase levels and a mild elevation in serum creatinine. The patient has been advised to increase his water intake to 6 to 8 glasses of water daily and to refrain from using nonsteroidal anti-inflammatories for pain control. He knows to use Tylenol for pain relief. Simvastatin will be discontinued and replaced with atorvastatin 10 mg at bedtime dosing. His LDL cholesterol goal remains less than 55, which will be monitored with upcoming labs at our next visit.    2. 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  The patient was recently diagnosed with an invasive contained rectal carcinoma and is being monitored every 3 months by Dr. Jaylyn Rees.    3. Hypertension.  The patient's blood pressure is slightly elevated in the office, indicative of white coat hypertension. The overall therapeutic plan will be maintained. The patient has been advised to adhere to a DASH diet, which is low in sodium, exercise as tolerated, and lose weight.    4. Impaired fasting glucose.  The patient has shown interval improvement with recent weight loss. The condition will continue to be monitored.    5. B12 deficiency and folic acid deficiency.  The patient's condition has improved without additional therapy. Folic acid will be discontinued and labs will be rechecked in 6 months.    6. Hypothyroidism.  The condition is stable. The patient will continue with the same medication.    7. Seasonal allergies.  The patient's condition is stable. The current medication regimen will be continued.    Follow-up  A follow-up visit is scheduled for 6 months from now for a Medicare visit.       Patient was given instructions and counseling regarding his condition or for health maintenance advice. Please see specific information pulled into the AVS if appropriate.     Patient or patient representative verbalized consent for the use of Ambient  Listening during the visit with  Lane Puri MD for chart documentation. 7/24/2024  14:21 EDT

## 2024-07-24 NOTE — PATIENT INSTRUCTIONS
"DASH Eating Plan  DASH stands for Dietary Approaches to Stop Hypertension. The DASH eating plan is a healthy eating plan that has been shown to:  Lower high blood pressure (hypertension).  Reduce your risk for type 2 diabetes, heart disease, and stroke.  Help with weight loss.  What are tips for following this plan?  Reading food labels  Check food labels for the amount of salt (sodium) per serving. Choose foods with less than 5 percent of the Daily Value (DV) of sodium. In general, foods with less than 300 milligrams (mg) of sodium per serving fit into this eating plan.  To find whole grains, look for the word \"whole\" as the first word in the ingredient list.  Shopping  Buy products labeled as \"low-sodium\" or \"no salt added.\"  Buy fresh foods. Avoid canned foods and pre-made or frozen meals.  Cooking  Try not to add salt when you cook. Use salt-free seasonings or herbs instead of table salt or sea salt. Check with your health care provider or pharmacist before using salt substitutes.  Do not santos foods. Cook foods in healthy ways, such as baking, boiling, grilling, roasting, or broiling.  Cook using oils that are good for your heart. These include olive, canola, avocado, soybean, and sunflower oil.  Meal planning    Eat a balanced diet. This should include:  4 or more servings of fruits and 4 or more servings of vegetables each day. Try to fill half of your plate with fruits and vegetables.  6-8 servings of whole grains each day.  6 or less servings of lean meat, poultry, or fish each day. 1 oz is 1 serving. A 3 oz (85 g) serving of meat is about the same size as the palm of your hand. One egg is 1 oz (28 g).  2-3 servings of low-fat dairy each day. One serving is 1 cup (237 mL).  1 serving of nuts, seeds, or beans 5 times each week.  2-3 servings of heart-healthy fats. Healthy fats called omega-3 fatty acids are found in foods such as walnuts, flaxseeds, fortified milks, and eggs. These fats are also found in " cold-water fish, such as sardines, salmon, and mackerel.  Limit how much you eat of:  Canned or prepackaged foods.  Food that is high in trans fat, such as fried foods.  Food that is high in saturated fat, such as fatty meat.  Desserts and other sweets, sugary drinks, and other foods with added sugar.  Full-fat dairy products.  Do not salt foods before eating.  Do not eat more than 4 egg yolks a week.  Try to eat at least 2 vegetarian meals a week.  Eat more home-cooked food and less restaurant, buffet, and fast food.  Lifestyle  When eating at a restaurant, ask if your food can be made with less salt or no salt.  If you drink alcohol:  Limit how much you have to:  0-1 drink a day if you are female.  0-2 drinks a day if you are male.  Know how much alcohol is in your drink. In the [place] one drink is one 12 oz bottle of beer (355 mL), one 5 oz glass of wine (148 mL), or one 1½ oz glass of hard liquor (44 mL).  General information  Avoid eating more than 2,300 mg of salt a day. If you have hypertension, you may need to reduce your sodium intake to 1,500 mg a day.  Work with your provider to stay at a healthy body weight or lose weight. Ask what the best weight range is for you.  On most days of the week, get at least 30 minutes of exercise that causes your heart to beat faster. This may include walking, swimming, or biking.  Work with your provider or dietitian to adjust your eating plan to meet your specific calorie needs.  What foods should I eat?  Fruits  All fresh, dried, or frozen fruit. Canned fruits that are in their natural juice and do not have sugar added to them.  Vegetables  Fresh or frozen vegetables that are raw, steamed, roasted, or grilled. Low-sodium or reduced-sodium tomato and vegetable juice. Low-sodium or reduced-sodium tomato sauce and tomato paste. Low-sodium or reduced-sodium canned vegetables.  Grains  Whole-grain or whole-wheat bread. Whole-grain or whole-wheat pasta. Brown rice. Oatmeal.  Quinoa. Bulgur. Whole-grain and low-sodium cereals. Sarah bread. Low-fat, low-sodium crackers. Whole-wheat flour tortillas.  Meats and other proteins  Skinless chicken or turkey. Ground chicken or turkey. Pork with fat trimmed off. Fish and seafood. Egg whites. Dried beans, peas, or lentils. Unsalted nuts, nut butters, and seeds. Unsalted canned beans. Lean cuts of beef with fat trimmed off. Low-sodium, lean precooked or cured meat, such as sausages or meat loaves.  Dairy  Low-fat (1%) or fat-free (skim) milk. Reduced-fat, low-fat, or fat-free cheeses. Nonfat, low-sodium ricotta or cottage cheese. Low-fat or nonfat yogurt. Low-fat, low-sodium cheese.  Fats and oils  Soft margarine without trans fats. Vegetable oil. Reduced-fat, low-fat, or light mayonnaise and salad dressings (reduced-sodium). Canola, safflower, olive, avocado, soybean, and sunflower oils. Avocado.  Seasonings and condiments  Herbs. Spices. Seasoning mixes without salt.  Other foods  Unsalted popcorn and pretzels. Fat-free sweets.  The items listed above may not be all the foods and drinks you can have. Talk to a dietitian to learn more.  What foods should I avoid?  Fruits  Canned fruit in a light or heavy syrup. Fried fruit. Fruit in cream or butter sauce.  Vegetables  Creamed or fried vegetables. Vegetables in a cheese sauce. Regular canned vegetables that are not marked as low-sodium or reduced-sodium. Regular canned tomato sauce and paste that are not marked as low-sodium or reduced-sodium. Regular tomato and vegetable juices that are not marked as low-sodium or reduced-sodium. Pickles. Olives.  Grains  Baked goods made with fat, such as croissants, muffins, or some breads. Dry pasta or rice meal packs.  Meats and other proteins  Fatty cuts of meat. Ribs. Fried meat. Macias. Bologna, salami, and other precooked or cured meats, such as sausages or meat loaves, that are not lean and low in sodium. Fat from the back of a pig (fatback). Sin.  Salted nuts and seeds. Canned beans with added salt. Canned or smoked fish. Whole eggs or egg yolks. Chicken or turkey with skin.  Dairy  Whole or 2% milk, cream, and half-and-half. Whole or full-fat cream cheese. Whole-fat or sweetened yogurt. Full-fat cheese. Nondairy creamers. Whipped toppings. Processed cheese and cheese spreads.  Fats and oils  Butter. Stick margarine. Lard. Shortening. Ghee. Macias fat. Tropical oils, such as coconut, palm kernel, or palm oil.  Seasonings and condiments  Onion salt, garlic salt, seasoned salt, table salt, and sea salt. Worcestershire sauce. Tartar sauce. Barbecue sauce. Teriyaki sauce. Soy sauce, including reduced-sodium soy sauce. Steak sauce. Canned and packaged gravies. Fish sauce. Oyster sauce. Cocktail sauce. Store-bought horseradish. Ketchup. Mustard. Meat flavorings and tenderizers. Bouillon cubes. Hot sauces. Pre-made or packaged marinades. Pre-made or packaged taco seasonings. Relishes. Regular salad dressings.  Other foods  Salted popcorn and pretzels.  The items listed above may not be all the foods and drinks you should avoid. Talk to a dietitian to learn more.  Where to find more information  National Heart, Lung, and Blood Strong (NHLBI): nhlbi.nih.gov  American Heart Association (AHA): heart.org  Academy of Nutrition and Dietetics: eatright.org  National Kidney Foundation (NKF): kidney.org  This information is not intended to replace advice given to you by your health care provider. Make sure you discuss any questions you have with your health care provider.  Document Revised: 01/04/2024 Document Reviewed: 01/04/2024  [employer] Patient Education © 2024 [employer]  Exercising to Lose Weight  Getting regular exercise is important for everyone. It is especially important if you are overweight. Being overweight increases your risk of heart disease, stroke, diabetes, high blood pressure, and several types of cancer. Exercising, and reducing the calories you  consume, can help you lose weight and improve fitness and health.  Exercise can be moderate or vigorous intensity. To lose weight, most people need to do a certain amount of moderate or vigorous-intensity exercise each week.  How can exercise affect me?  You lose weight when you exercise enough to burn more calories than you eat. Exercise also reduces body fat and builds muscle. The more muscle you have, the more calories you burn. Exercise also:  Improves mood.  Reduces stress and tension.  Improves your overall fitness, flexibility, and endurance.  Increases bone strength.  Moderate-intensity exercise    Moderate-intensity exercise is any activity that gets you moving enough to burn at least three times more energy (calories) than if you were sitting.  Examples of moderate exercise include:  Walking a mile in 15 minutes.  Doing light yard work.  Biking at an easy pace.  Most people should get at least 150 minutes of moderate-intensity exercise a week to maintain their body weight.  Vigorous-intensity exercise  Vigorous-intensity exercise is any activity that gets you moving enough to burn at least six times more calories than if you were sitting. When you exercise at this intensity, you should be working hard enough that you are not able to carry on a conversation.  Examples of vigorous exercise include:  Running.  Playing a team sport, such as football, basketball, and soccer.  Jumping rope.  Most people should get at least 75 minutes a week of vigorous exercise to maintain their body weight.  What actions can I take to lose weight?  The amount of exercise you need to lose weight depends on:  Your age.  The type of exercise.  Any health conditions you have.  Your overall physical ability.  Talk to your health care provider about how much exercise you need and what types of activities are safe for you.  Nutrition    Make changes to your diet as told by your health care provider or diet and nutrition specialist  (dietitian). This may include:  Eating fewer calories.  Eating more protein.  Eating less unhealthy fats.  Eating a diet that includes fresh fruits and vegetables, whole grains, low-fat dairy products, and lean protein.  Avoiding foods with added fat, salt, and sugar.  Drink plenty of water while you exercise to prevent dehydration or heat stroke.  Activity  Choose an activity that you enjoy and set realistic goals. Your health care provider can help you make an exercise plan that works for you.  Exercise at a moderate or vigorous intensity most days of the week.  The intensity of exercise may vary from person to person. You can tell how intense a workout is for you by paying attention to your breathing and heartbeat. Most people will notice their breathing and heartbeat get faster with more intense exercise.  Do resistance training twice each week, such as:  Push-ups.  Sit-ups.  Lifting weights.  Using resistance bands.  Getting short amounts of exercise can be just as helpful as long, structured periods of exercise. If you have trouble finding time to exercise, try doing these things as part of your daily routine:  Get up, stretch, and walk around every 30 minutes throughout the day.  Go for a walk during your lunch break.  Park your car farther away from your destination.  If you take public transportation, get off one stop early and walk the rest of the way.  Make phone calls while standing up and walking around.  Take the stairs instead of elevators or escalators.  Wear comfortable clothes and shoes with good support.  Do not exercise so much that you hurt yourself, feel dizzy, or get very short of breath.  Where to find more information  [place] Department of Health and Human Services: www.hhs.gov  Centers for Disease Control and Prevention: www.cdc.gov  Contact a health care provider:  Before starting a new exercise program.  If you have questions or concerns about your weight.  If you have a medical problem  that keeps you from exercising.  Get help right away if:  You have any of the following while exercising:  Injury.  Dizziness.  Difficulty breathing or shortness of breath that does not go away when you stop exercising.  Chest pain.  Rapid heartbeat.  These symptoms may represent a serious problem that is an emergency. Do not wait to see if the symptoms will go away. Get medical help right away. Call your local emergency services (911 in the [place] Do not drive yourself to the hospital.  Summary  Getting regular exercise is especially important if you are overweight.  Being overweight increases your risk of heart disease, stroke, diabetes, high blood pressure, and several types of cancer.  Losing weight happens when you burn more calories than you eat.  Reducing the amount of calories you eat, and getting regular moderate or vigorous exercise each week, helps you lose weight.  This information is not intended to replace advice given to you by your health care provider. Make sure you discuss any questions you have with your health care provider.  Document Revised: 02/13/2022 Document Reviewed: 02/13/2022  Elsevier Patient Education © 2024 [employer] Inc.

## 2024-08-14 ENCOUNTER — OFFICE VISIT (OUTPATIENT)
Dept: FAMILY MEDICINE CLINIC | Facility: CLINIC | Age: 74
End: 2024-08-14
Payer: MEDICARE

## 2024-08-14 VITALS
TEMPERATURE: 97.4 F | HEIGHT: 73 IN | HEART RATE: 81 BPM | WEIGHT: 288.4 LBS | OXYGEN SATURATION: 97 % | SYSTOLIC BLOOD PRESSURE: 146 MMHG | DIASTOLIC BLOOD PRESSURE: 86 MMHG | BODY MASS INDEX: 38.22 KG/M2

## 2024-08-14 DIAGNOSIS — I25.10 CORONARY ARTERY DISEASE INVOLVING NATIVE CORONARY ARTERY OF NATIVE HEART WITHOUT ANGINA PECTORIS: ICD-10-CM

## 2024-08-14 DIAGNOSIS — I25.2 HISTORY OF MI (MYOCARDIAL INFARCTION): ICD-10-CM

## 2024-08-14 DIAGNOSIS — Z78.9 STATIN INTOLERANCE: ICD-10-CM

## 2024-08-14 DIAGNOSIS — E78.49 OTHER HYPERLIPIDEMIA: Primary | ICD-10-CM

## 2024-08-14 DIAGNOSIS — N45.1 EPIDIDYMITIS, RIGHT: ICD-10-CM

## 2024-08-14 PROCEDURE — 1125F AMNT PAIN NOTED PAIN PRSNT: CPT | Performed by: FAMILY MEDICINE

## 2024-08-14 PROCEDURE — 99214 OFFICE O/P EST MOD 30 MIN: CPT | Performed by: FAMILY MEDICINE

## 2024-08-14 PROCEDURE — 3079F DIAST BP 80-89 MM HG: CPT | Performed by: FAMILY MEDICINE

## 2024-08-14 PROCEDURE — 3077F SYST BP >= 140 MM HG: CPT | Performed by: FAMILY MEDICINE

## 2024-08-14 PROCEDURE — G2211 COMPLEX E/M VISIT ADD ON: HCPCS | Performed by: FAMILY MEDICINE

## 2024-08-14 RX ORDER — DOXYCYCLINE HYCLATE 100 MG
100 TABLET ORAL 2 TIMES DAILY
Qty: 42 TABLET | Refills: 0 | Status: SHIPPED | OUTPATIENT
Start: 2024-08-14 | End: 2024-09-04

## 2024-08-14 NOTE — PROGRESS NOTES
"Chief Complaint  Diarrhea, Muscle Pain, and Blurred Vision    Subjective        Diarrhea     Muscle Pain  Associated symptoms include diarrhea.         The patient presents for evaluation of multiple medical concerns.    He reports an improvement in his condition. His medication was changed from simvastatin to atorvastatin 10 mg due to elevated creatinine kinase levels. He has been experiencing side effects such as muscle pain in his calves and shoulder, and severe lower back pain, which he describes as a knife-like sensation. He is not experiencing any chest pain.    He suspects a urinary tract infection or epididymitis, although no urine sample has been collected for confirmation. He believes he is suffering from epididymitis, a condition he has experienced several times before, usually triggered by heavy lifting. However, this time, the pain started on Saturday morning when he bent over to  a pair of shorts, without any heavy lifting involved. He has been avoiding lifting objects for several years due to this issue. He also reports tenderness in his right testicle.     Review of Systems   Gastrointestinal:  Positive for diarrhea.        Vital Signs:   Vitals:    08/14/24 1535   BP: 146/86   Pulse: 81   Temp: 97.4 °F (36.3 °C)   TempSrc: Oral   SpO2: 97%   Weight: 131 kg (288 lb 6.4 oz)   Height: 185.4 cm (73\")   PainSc:   5   PainLoc: Generalized            7/24/2024     1:47 PM   PHQ-2/PHQ-9 Depression Screening   Little Interest or Pleasure in Doing Things 0-->not at all   Feeling Down, Depressed or Hopeless 0-->not at all   PHQ-9: Brief Depression Severity Measure Score 0                 Physical Exam  Constitutional:       General: He is not in acute distress.  Abdominal:      Hernia: There is no hernia in the left inguinal area or right inguinal area.   Genitourinary:         Comments: Area of tenderness  Neurological:      Mental Status: He is alert.                       Results              "     Assessment and Plan     Orders Placed This Encounter   Procedures    Ambulatory Referral to [facility] For Infusion Treatment     New Medications Ordered This Visit   Medications    doxycycline (VIBRAMYICN) 100 MG tablet     Sig: Take 1 tablet by mouth 2 (Two) Times a Day for 21 days. No dairy products within 2 hours of a dose     Dispense:  42 tablet     Refill:  0        1. Hyperlipidemia.  He has evidence of multi-statin intolerance and elevated creatinine kinase with the most recent use of simvastatin. He experienced increased muscle and joint discomfort and leg discomfort since starting atorvastatin 10 mg. Given his known coronary artery disease and history of myocardial infarction, Leqvio injection/infusion plan will be initiated. The infusion will be done at the [facility]. The follow-up is scheduled for 01/16/2025 with repeat labs previously scheduled.    2. Right epididymitis.  Treatment with doxycycline 100 mg twice daily for 3 weeks. Warm soaks are recommended for symptom relief. Follow-up on an as-needed basis if the condition persists.       Return in 5 months (on 1/16/2025) for next scheduled follow up.     Patient was given instructions and counseling regarding his condition or for health maintenance advice. Please see specific information pulled into the AVS if appropriate.     Patient or patient representative verbalized consent for the use of Ambient Listening during the visit with  Lane Puri MD for chart documentation. 8/14/2024  16:08 EDT

## 2024-08-15 ENCOUNTER — TRANSCRIBE ORDERS (OUTPATIENT)
Dept: ADMINISTRATIVE | Facility: HOSPITAL | Age: 74
End: 2024-08-15
Payer: MEDICARE

## 2024-08-19 ENCOUNTER — LAB (OUTPATIENT)
Dept: LAB | Facility: HOSPITAL | Age: 74
End: 2024-08-19
Payer: MEDICARE

## 2024-08-19 DIAGNOSIS — R73.01 IFG (IMPAIRED FASTING GLUCOSE): ICD-10-CM

## 2024-08-19 DIAGNOSIS — I25.2 HISTORY OF MI (MYOCARDIAL INFARCTION): ICD-10-CM

## 2024-08-19 DIAGNOSIS — E53.8 B12 DEFICIENCY: ICD-10-CM

## 2024-08-19 DIAGNOSIS — E78.49 OTHER HYPERLIPIDEMIA: ICD-10-CM

## 2024-08-19 DIAGNOSIS — N18.31 STAGE 3A CHRONIC KIDNEY DISEASE: ICD-10-CM

## 2024-08-19 DIAGNOSIS — I25.10 CORONARY ARTERY DISEASE INVOLVING NATIVE CORONARY ARTERY OF NATIVE HEART WITHOUT ANGINA PECTORIS: ICD-10-CM

## 2024-08-19 DIAGNOSIS — I10 BENIGN ESSENTIAL HYPERTENSION: ICD-10-CM

## 2024-08-19 DIAGNOSIS — Z78.9 STATIN INTOLERANCE: ICD-10-CM

## 2024-08-19 LAB
ALBUMIN SERPL-MCNC: 4 G/DL (ref 3.5–5.2)
ALBUMIN/GLOB SERPL: 1.7 G/DL
ALP SERPL-CCNC: 120 U/L (ref 39–117)
ALT SERPL W P-5'-P-CCNC: 23 U/L (ref 1–41)
ANION GAP SERPL CALCULATED.3IONS-SCNC: 8 MMOL/L (ref 5–15)
AST SERPL-CCNC: 21 U/L (ref 1–40)
BASOPHILS # BLD AUTO: 0.01 10*3/MM3 (ref 0–0.2)
BASOPHILS NFR BLD AUTO: 0.1 % (ref 0–1.5)
BILIRUB SERPL-MCNC: 2.5 MG/DL (ref 0–1.2)
BUN SERPL-MCNC: 20 MG/DL (ref 8–23)
BUN/CREAT SERPL: 13.8 (ref 7–25)
CALCIUM SPEC-SCNC: 8.6 MG/DL (ref 8.6–10.5)
CHLORIDE SERPL-SCNC: 108 MMOL/L (ref 98–107)
CHOLEST SERPL-MCNC: 129 MG/DL (ref 0–200)
CK SERPL-CCNC: 237 U/L (ref 20–200)
CO2 SERPL-SCNC: 24 MMOL/L (ref 22–29)
CREAT SERPL-MCNC: 1.45 MG/DL (ref 0.76–1.27)
DEPRECATED RDW RBC AUTO: 43.9 FL (ref 37–54)
EGFRCR SERPLBLD CKD-EPI 2021: 50.6 ML/MIN/1.73
EOSINOPHIL # BLD AUTO: 0.15 10*3/MM3 (ref 0–0.4)
EOSINOPHIL NFR BLD AUTO: 1.6 % (ref 0.3–6.2)
ERYTHROCYTE [DISTWIDTH] IN BLOOD BY AUTOMATED COUNT: 12.5 % (ref 12.3–15.4)
FOLATE SERPL-MCNC: 11.7 NG/ML (ref 4.78–24.2)
GLOBULIN UR ELPH-MCNC: 2.3 GM/DL
GLUCOSE SERPL-MCNC: 106 MG/DL (ref 65–99)
HCT VFR BLD AUTO: 47.2 % (ref 37.5–51)
HDLC SERPL QL: 3.31
HDLC SERPL-MCNC: 39 MG/DL (ref 40–60)
HGB BLD-MCNC: 16.2 G/DL (ref 13–17.7)
IMM GRANULOCYTES # BLD AUTO: 0.04 10*3/MM3 (ref 0–0.05)
IMM GRANULOCYTES NFR BLD AUTO: 0.4 % (ref 0–0.5)
LDLC SERPL CALC-MCNC: 71 MG/DL (ref 0–100)
LYMPHOCYTES # BLD AUTO: 3.74 10*3/MM3 (ref 0.7–3.1)
LYMPHOCYTES NFR BLD AUTO: 40.3 % (ref 19.6–45.3)
MCH RBC QN AUTO: 33.3 PG (ref 26.6–33)
MCHC RBC AUTO-ENTMCNC: 34.3 G/DL (ref 31.5–35.7)
MCV RBC AUTO: 97.1 FL (ref 79–97)
MONOCYTES # BLD AUTO: 0.87 10*3/MM3 (ref 0.1–0.9)
MONOCYTES NFR BLD AUTO: 9.4 % (ref 5–12)
NEUTROPHILS NFR BLD AUTO: 4.46 10*3/MM3 (ref 1.7–7)
NEUTROPHILS NFR BLD AUTO: 48.2 % (ref 42.7–76)
NRBC BLD AUTO-RTO: 0 /100 WBC (ref 0–0.2)
PLATELET # BLD AUTO: 150 10*3/MM3 (ref 140–450)
PMV BLD AUTO: 12.2 FL (ref 6–12)
POTASSIUM SERPL-SCNC: 3.8 MMOL/L (ref 3.5–5.2)
PROT SERPL-MCNC: 6.3 G/DL (ref 6–8.5)
RBC # BLD AUTO: 4.86 10*6/MM3 (ref 4.14–5.8)
SODIUM SERPL-SCNC: 140 MMOL/L (ref 136–145)
TRIGL SERPL-MCNC: 100 MG/DL (ref 0–150)
TSH SERPL DL<=0.05 MIU/L-ACNC: 4.41 UIU/ML (ref 0.27–4.2)
VIT B12 BLD-MCNC: 312 PG/ML (ref 211–946)
VLDLC SERPL-MCNC: 19 MG/DL (ref 5–40)
WBC NRBC COR # BLD AUTO: 9.27 10*3/MM3 (ref 3.4–10.8)

## 2024-08-19 PROCEDURE — 83921 ORGANIC ACID SINGLE QUANT: CPT

## 2024-08-19 PROCEDURE — 85025 COMPLETE CBC W/AUTO DIFF WBC: CPT

## 2024-08-19 PROCEDURE — 84443 ASSAY THYROID STIM HORMONE: CPT

## 2024-08-19 PROCEDURE — 82746 ASSAY OF FOLIC ACID SERUM: CPT

## 2024-08-19 PROCEDURE — 82550 ASSAY OF CK (CPK): CPT

## 2024-08-19 PROCEDURE — 80061 LIPID PANEL: CPT

## 2024-08-19 PROCEDURE — 82607 VITAMIN B-12: CPT

## 2024-08-19 PROCEDURE — 80053 COMPREHEN METABOLIC PANEL: CPT

## 2024-08-19 PROCEDURE — 36415 COLL VENOUS BLD VENIPUNCTURE: CPT

## 2024-08-19 NOTE — PROGRESS NOTES
Please give the patient the following message:  Your test results have some minor abnormalities that do not require any change in your treatment at this time. Scribe Attestation (For Scribes USE Only)... Attending Attestation (For Attendings USE Only).../Scribe Attestation (For Scribes USE Only)...

## 2024-08-23 LAB — METHYLMALONATE SERPL-SCNC: 298 NMOL/L (ref 0–378)

## 2024-08-29 ENCOUNTER — INFUSION (OUTPATIENT)
Dept: ONCOLOGY | Facility: HOSPITAL | Age: 74
End: 2024-08-29
Payer: MEDICARE

## 2024-08-29 DIAGNOSIS — E78.49 OTHER HYPERLIPIDEMIA: ICD-10-CM

## 2024-08-29 DIAGNOSIS — I25.10 CORONARY ARTERY DISEASE INVOLVING NATIVE CORONARY ARTERY OF NATIVE HEART WITHOUT ANGINA PECTORIS: Primary | ICD-10-CM

## 2024-08-29 DIAGNOSIS — Z78.9 STATIN INTOLERANCE: ICD-10-CM

## 2024-08-29 DIAGNOSIS — I25.2 HISTORY OF MI (MYOCARDIAL INFARCTION): ICD-10-CM

## 2024-08-29 PROCEDURE — 96372 THER/PROPH/DIAG INJ SC/IM: CPT

## 2024-08-29 PROCEDURE — 25010000002 INCLISIRAN SODIUM 284 MG/1.5ML SOLUTION PREFILLED SYRINGE: Performed by: FAMILY MEDICINE

## 2024-08-29 RX ADMIN — INCLISIRAN 284 MG: 284 INJECTION, SOLUTION SUBCUTANEOUS at 15:35
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
## 2024-11-15 ENCOUNTER — OFFICE VISIT (OUTPATIENT)
Dept: SURGERY | Facility: CLINIC | Age: 74
End: 2024-11-15
Payer: MEDICARE

## 2024-11-15 VITALS
HEART RATE: 76 BPM | SYSTOLIC BLOOD PRESSURE: 138 MMHG | DIASTOLIC BLOOD PRESSURE: 96 MMHG | BODY MASS INDEX: 37.47 KG/M2 | HEIGHT: 74 IN | OXYGEN SATURATION: 97 % | WEIGHT: 292 LBS

## 2024-11-15 DIAGNOSIS — Z85.048 HISTORY OF ANAL CANCER: Primary | ICD-10-CM

## 2024-11-15 PROCEDURE — 3075F SYST BP GE 130 - 139MM HG: CPT | Performed by: COLON & RECTAL SURGERY

## 2024-11-15 PROCEDURE — 46600 DIAGNOSTIC ANOSCOPY SPX: CPT | Performed by: COLON & RECTAL SURGERY

## 2024-11-15 PROCEDURE — 3080F DIAST BP >= 90 MM HG: CPT | Performed by: COLON & RECTAL SURGERY

## 2024-11-15 PROCEDURE — 1159F MED LIST DOCD IN RCRD: CPT | Performed by: COLON & RECTAL SURGERY

## 2024-11-15 PROCEDURE — 99212 OFFICE O/P EST SF 10 MIN: CPT | Performed by: COLON & RECTAL SURGERY

## 2024-11-15 PROCEDURE — 1160F RVW MEDS BY RX/DR IN RCRD: CPT | Performed by: COLON & RECTAL SURGERY

## 2024-11-15 NOTE — PROGRESS NOTES
"Tony Max is a 74 y.o. male in for follow up of History of anal cancer    History of Present Illness  The patient is here for a perianal exam. He had an anal cancer that we removed. He has got advanced dementia. No further treatment has been recommended.    He reports no recurrence of the previously removed nodule. He also denies any presence of blood during bowel movements. His bowel movements have been regular, although he experienced a bout of diarrhea this morning.       /96 (BP Location: Left arm, Patient Position: Sitting, Cuff Size: Adult)   Pulse 76   Ht 188 cm (74\")   Wt 132 kg (292 lb)   SpO2 97%   BMI 37.49 kg/m²   Body mass index is 37.49 kg/m².      PE:   Physical Exam    Physical Exam  Constitutional:       General: He is not in acute distress.     Appearance: He is well-developed.   HENT:      Head: Normocephalic and atraumatic.   Abdominal:      General: There is no distension.      Palpations: Abdomen is soft.   Genitourinary:     Comments: Perianal exam:  He has a scar anteriorly. There is no evidence of disease.  GAYE: no masses  Anoscopy : ALICIA    Musculoskeletal:         General: Normal range of motion.   Neurological:      Mental Status: He is alert.   Psychiatric:         Thought Content: Thought content normal.             Assessment & Plan      Follow-up  Return in 3 months for continued surveillance.     1. History of anal cancer           Patient or patient representative verbalized consent for the use of Ambient Listening during the visit with  Dionna Rees MD for chart documentation. 12/2/2024  19:58 EST     "

## 2024-12-03 ENCOUNTER — INFUSION (OUTPATIENT)
Dept: ONCOLOGY | Facility: HOSPITAL | Age: 74
End: 2024-12-03
Payer: MEDICARE

## 2024-12-03 ENCOUNTER — TELEPHONE (OUTPATIENT)
Dept: FAMILY MEDICINE CLINIC | Facility: CLINIC | Age: 74
End: 2024-12-03
Payer: MEDICARE

## 2024-12-03 DIAGNOSIS — Z78.9 STATIN INTOLERANCE: ICD-10-CM

## 2024-12-03 DIAGNOSIS — I25.2 HISTORY OF MI (MYOCARDIAL INFARCTION): ICD-10-CM

## 2024-12-03 DIAGNOSIS — E78.49 OTHER HYPERLIPIDEMIA: ICD-10-CM

## 2024-12-03 DIAGNOSIS — I25.10 CORONARY ARTERY DISEASE INVOLVING NATIVE CORONARY ARTERY OF NATIVE HEART WITHOUT ANGINA PECTORIS: Primary | ICD-10-CM

## 2024-12-03 PROCEDURE — 25010000002 INCLISIRAN SODIUM 284 MG/1.5ML SOLUTION PREFILLED SYRINGE: Performed by: FAMILY MEDICINE

## 2024-12-03 PROCEDURE — 96372 THER/PROPH/DIAG INJ SC/IM: CPT

## 2024-12-03 RX ADMIN — INCLISIRAN 284 MG: 284 INJECTION, SOLUTION SUBCUTANEOUS at 11:10

## 2024-12-03 NOTE — NURSING NOTE
Pt. Has 1- appt.per [facility]; however no orders entered. This nurse informed him that b fasting lipid profile should be rechecked 4-12weeks after starting therapy and then q 3-12 months thereafter. Informed him that he should check with Dr. Puri as to his orders; v/u.

## 2024-12-03 NOTE — TELEPHONE ENCOUNTER
Patient just had his 2nd infusion of Liqvio and he was told that he needs to have his lipid panel checked in 1 week-2 weeks.    Please order, and then patient needs to be called to set up for the lab appointment in office.

## 2024-12-11 DIAGNOSIS — E78.49 OTHER HYPERLIPIDEMIA: ICD-10-CM

## 2024-12-11 DIAGNOSIS — I25.2 HISTORY OF MI (MYOCARDIAL INFARCTION): ICD-10-CM

## 2024-12-11 LAB
CHOLEST SERPL-MCNC: 115 MG/DL (ref 0–200)
CHOLEST/HDLC SERPL: 3.19 {RATIO}
HDLC SERPL-MCNC: 36 MG/DL (ref 40–60)
LDLC SERPL CALC-MCNC: 62 MG/DL (ref 0–100)
TRIGL SERPL-MCNC: 84 MG/DL (ref 0–150)
VLDLC SERPL CALC-MCNC: 17 MG/DL (ref 5–40)

## 2025-01-10 DIAGNOSIS — I25.2 HISTORY OF MI (MYOCARDIAL INFARCTION): ICD-10-CM

## 2025-01-10 DIAGNOSIS — E53.8 B12 DEFICIENCY: ICD-10-CM

## 2025-01-10 DIAGNOSIS — E78.49 OTHER HYPERLIPIDEMIA: Primary | ICD-10-CM

## 2025-01-10 DIAGNOSIS — R73.01 IFG (IMPAIRED FASTING GLUCOSE): ICD-10-CM

## 2025-01-11 LAB
ALBUMIN SERPL-MCNC: 4 G/DL (ref 3.5–5.2)
ALBUMIN/GLOB SERPL: 1.5 G/DL
ALP SERPL-CCNC: 110 U/L (ref 39–117)
ALT SERPL-CCNC: 33 U/L (ref 1–41)
AST SERPL-CCNC: 30 U/L (ref 1–40)
BASOPHILS # BLD AUTO: 0.01 10*3/MM3 (ref 0–0.2)
BASOPHILS NFR BLD AUTO: 0.1 % (ref 0–1.5)
BILIRUB SERPL-MCNC: 2.4 MG/DL (ref 0–1.2)
BUN SERPL-MCNC: 18 MG/DL (ref 8–23)
BUN/CREAT SERPL: 12.5 (ref 7–25)
CALCIUM SERPL-MCNC: 8.9 MG/DL (ref 8.6–10.5)
CHLORIDE SERPL-SCNC: 108 MMOL/L (ref 98–107)
CHOLEST SERPL-MCNC: 103 MG/DL (ref 0–200)
CHOLEST/HDLC SERPL: 2.64 {RATIO}
CK SERPL-CCNC: 404 U/L (ref 20–200)
CO2 SERPL-SCNC: 25.7 MMOL/L (ref 22–29)
CREAT SERPL-MCNC: 1.44 MG/DL (ref 0.76–1.27)
EGFRCR SERPLBLD CKD-EPI 2021: 50.7 ML/MIN/1.73
EOSINOPHIL # BLD AUTO: 0.03 10*3/MM3 (ref 0–0.4)
EOSINOPHIL NFR BLD AUTO: 0.4 % (ref 0.3–6.2)
ERYTHROCYTE [DISTWIDTH] IN BLOOD BY AUTOMATED COUNT: 13.2 % (ref 12.3–15.4)
FOLATE SERPL-MCNC: 2.78 NG/ML (ref 4.78–24.2)
GLOBULIN SER CALC-MCNC: 2.6 GM/DL
GLUCOSE SERPL-MCNC: 116 MG/DL (ref 65–99)
HBA1C MFR BLD: 5.8 % (ref 4.8–5.6)
HCT VFR BLD AUTO: 51.9 % (ref 37.5–51)
HDLC SERPL-MCNC: 39 MG/DL (ref 40–60)
HGB BLD-MCNC: 17.7 G/DL (ref 13–17.7)
IMM GRANULOCYTES # BLD AUTO: 0.02 10*3/MM3 (ref 0–0.05)
IMM GRANULOCYTES NFR BLD AUTO: 0.3 % (ref 0–0.5)
LDLC SERPL CALC-MCNC: 49 MG/DL (ref 0–100)
LYMPHOCYTES # BLD AUTO: 2.93 10*3/MM3 (ref 0.7–3.1)
LYMPHOCYTES NFR BLD AUTO: 37.4 % (ref 19.6–45.3)
MCH RBC QN AUTO: 34 PG (ref 26.6–33)
MCHC RBC AUTO-ENTMCNC: 34.1 G/DL (ref 31.5–35.7)
MCV RBC AUTO: 99.8 FL (ref 79–97)
MONOCYTES # BLD AUTO: 0.69 10*3/MM3 (ref 0.1–0.9)
MONOCYTES NFR BLD AUTO: 8.8 % (ref 5–12)
NEUTROPHILS # BLD AUTO: 4.15 10*3/MM3 (ref 1.7–7)
NEUTROPHILS NFR BLD AUTO: 53 % (ref 42.7–76)
NRBC BLD AUTO-RTO: 0 /100 WBC (ref 0–0.2)
PLATELET # BLD AUTO: 170 10*3/MM3 (ref 140–450)
POTASSIUM SERPL-SCNC: 4.7 MMOL/L (ref 3.5–5.2)
PROT SERPL-MCNC: 6.6 G/DL (ref 6–8.5)
RBC # BLD AUTO: 5.2 10*6/MM3 (ref 4.14–5.8)
SODIUM SERPL-SCNC: 142 MMOL/L (ref 136–145)
TRIGL SERPL-MCNC: 74 MG/DL (ref 0–150)
TSH SERPL DL<=0.005 MIU/L-ACNC: 2.78 UIU/ML (ref 0.27–4.2)
VIT B12 SERPL-MCNC: 262 PG/ML (ref 211–946)
VLDLC SERPL CALC-MCNC: 15 MG/DL (ref 5–40)
WBC # BLD AUTO: 7.83 10*3/MM3 (ref 3.4–10.8)

## 2025-01-16 ENCOUNTER — OFFICE VISIT (OUTPATIENT)
Dept: FAMILY MEDICINE CLINIC | Facility: CLINIC | Age: 75
End: 2025-01-16
Payer: MEDICARE

## 2025-01-16 VITALS
SYSTOLIC BLOOD PRESSURE: 128 MMHG | BODY MASS INDEX: 38.78 KG/M2 | HEIGHT: 74 IN | DIASTOLIC BLOOD PRESSURE: 94 MMHG | HEART RATE: 83 BPM | WEIGHT: 302.2 LBS | TEMPERATURE: 97.6 F | OXYGEN SATURATION: 95 %

## 2025-01-16 DIAGNOSIS — E66.812 CLASS 2 SEVERE OBESITY DUE TO EXCESS CALORIES WITH SERIOUS COMORBIDITY AND BODY MASS INDEX (BMI) OF 38.0 TO 38.9 IN ADULT: ICD-10-CM

## 2025-01-16 DIAGNOSIS — N18.31 STAGE 3A CHRONIC KIDNEY DISEASE: ICD-10-CM

## 2025-01-16 DIAGNOSIS — E03.9 HYPOTHYROIDISM, ACQUIRED: ICD-10-CM

## 2025-01-16 DIAGNOSIS — E78.49 OTHER HYPERLIPIDEMIA: ICD-10-CM

## 2025-01-16 DIAGNOSIS — R29.898 WEAKNESS OF BOTH LOWER EXTREMITIES: ICD-10-CM

## 2025-01-16 DIAGNOSIS — R73.01 IFG (IMPAIRED FASTING GLUCOSE): ICD-10-CM

## 2025-01-16 DIAGNOSIS — I48.0 PAROXYSMAL ATRIAL FIBRILLATION: ICD-10-CM

## 2025-01-16 DIAGNOSIS — I10 BENIGN ESSENTIAL HYPERTENSION: ICD-10-CM

## 2025-01-16 DIAGNOSIS — J30.2 SEASONAL ALLERGIES: ICD-10-CM

## 2025-01-16 DIAGNOSIS — D75.89 MACROCYTOSIS WITHOUT ANEMIA: ICD-10-CM

## 2025-01-16 DIAGNOSIS — F17.210 CIGARETTE SMOKER: ICD-10-CM

## 2025-01-16 DIAGNOSIS — E53.8 B12 DEFICIENCY: ICD-10-CM

## 2025-01-16 DIAGNOSIS — E53.8 FOLIC ACID DEFICIENCY: ICD-10-CM

## 2025-01-16 DIAGNOSIS — E66.01 CLASS 2 SEVERE OBESITY DUE TO EXCESS CALORIES WITH SERIOUS COMORBIDITY AND BODY MASS INDEX (BMI) OF 38.0 TO 38.9 IN ADULT: ICD-10-CM

## 2025-01-16 DIAGNOSIS — Z91.81 AT MODERATE RISK FOR FALL: ICD-10-CM

## 2025-01-16 DIAGNOSIS — Z00.00 MEDICARE ANNUAL WELLNESS VISIT, SUBSEQUENT: Primary | ICD-10-CM

## 2025-01-16 PROBLEM — N45.1 EPIDIDYMITIS, RIGHT: Status: RESOLVED | Noted: 2024-08-14 | Resolved: 2025-01-16

## 2025-01-16 PROCEDURE — 99214 OFFICE O/P EST MOD 30 MIN: CPT | Performed by: FAMILY MEDICINE

## 2025-01-16 PROCEDURE — 3080F DIAST BP >= 90 MM HG: CPT | Performed by: FAMILY MEDICINE

## 2025-01-16 PROCEDURE — 1160F RVW MEDS BY RX/DR IN RCRD: CPT | Performed by: FAMILY MEDICINE

## 2025-01-16 PROCEDURE — 1170F FXNL STATUS ASSESSED: CPT | Performed by: FAMILY MEDICINE

## 2025-01-16 PROCEDURE — 1126F AMNT PAIN NOTED NONE PRSNT: CPT | Performed by: FAMILY MEDICINE

## 2025-01-16 PROCEDURE — G0439 PPPS, SUBSEQ VISIT: HCPCS | Performed by: FAMILY MEDICINE

## 2025-01-16 PROCEDURE — 3074F SYST BP LT 130 MM HG: CPT | Performed by: FAMILY MEDICINE

## 2025-01-16 PROCEDURE — 1159F MED LIST DOCD IN RCRD: CPT | Performed by: FAMILY MEDICINE

## 2025-01-16 RX ORDER — FOLIC ACID 1 MG/1
1 TABLET ORAL DAILY
Qty: 90 TABLET | Refills: 4 | Status: SHIPPED | OUTPATIENT
Start: 2025-01-16 | End: 2026-04-11

## 2025-01-16 RX ORDER — LEVOTHYROXINE SODIUM 150 UG/1
150 TABLET ORAL DAILY
Qty: 90 TABLET | Refills: 4 | Status: SHIPPED | OUTPATIENT
Start: 2025-01-16 | End: 2026-04-11

## 2025-01-16 RX ORDER — MONTELUKAST SODIUM 10 MG/1
10 TABLET ORAL EVERY EVENING
Qty: 90 TABLET | Refills: 4 | Status: SHIPPED | OUTPATIENT
Start: 2025-01-16 | End: 2026-04-11

## 2025-01-16 RX ORDER — LANOLIN ALCOHOL/MO/W.PET/CERES
1000 CREAM (GRAM) TOPICAL DAILY
Qty: 90 TABLET | Refills: 4 | Status: SHIPPED | OUTPATIENT
Start: 2025-01-16 | End: 2026-04-11

## 2025-01-16 NOTE — ASSESSMENT & PLAN NOTE
Today's blood pressure is slightly elevated in the office.  Patient states his blood pressure is controlled at home.  Continue same medicine.  He will follow-up blood pressure at his next cardio vascular visit with Dr. No  Orders:    Comprehensive Metabolic Panel; Future    CBC & Differential; Future

## 2025-01-16 NOTE — ASSESSMENT & PLAN NOTE
Condition is stable. The current medical regimen is effective;  continue present plan and medications.

## 2025-01-16 NOTE — ASSESSMENT & PLAN NOTE
Recommendations to increase water intake 6 to 8 glasses of water daily.  Continue to monitor kidney dysfunction on yearly basis  Orders:    Comprehensive Metabolic Panel; Future

## 2025-01-16 NOTE — ASSESSMENT & PLAN NOTE
Patient's (Body mass index is 38.8 kg/m².) indicates that they are morbidly/severely obese (BMI > 40 or > 35 with obesity - related health condition) with health conditions that include hypertension, impaired fasting glucose, and dyslipidemias . Weight is worsening. BMI  is above average; BMI management plan is completed. We discussed low calorie, low carb based diet program, portion control, increasing exercise, Weight Watchers or other Commercial based weight reduction program, and an paul-based approach such as [employer] Pal or Lose It.

## 2025-01-16 NOTE — ASSESSMENT & PLAN NOTE
Lipids are much improved with Leqvio.  Continue this infusion twice yearly.  Orders:    Lipid Panel With / Chol / HDL Ratio; Future    CK; Future

## 2025-01-16 NOTE — ASSESSMENT & PLAN NOTE
Impaired fasting glucose is stable.  Continue with low carbohydrate diet.  Recommendation for 30 minutes of aerobic activity 30 minutes 5 days a week as tolerated.  Orders:    Comprehensive Metabolic Panel; Future    Hemoglobin A1c; Future    MicroAlbumin, Urine, Random - Urine, Clean Catch; Future    Microalbumin / Creatinine Urine Ratio - Urine, Clean Catch; Future

## 2025-01-16 NOTE — ASSESSMENT & PLAN NOTE
Condition is stable. The current medical regimen is effective;  continue present plan and medications.  Orders:    montelukast (SINGULAIR) 10 MG tablet; Take 1 tablet by mouth Every Evening.

## 2025-01-16 NOTE — PROGRESS NOTES
Subjective   The ABCs of the Annual Wellness Visit  Medicare Wellness Visit      Tony Max is a 74 y.o. patient who presents for a Medicare Wellness Visit.    The following portions of the patient's history were reviewed and   updated as appropriate: allergies, current medications, past family history, past medical history, past social history, past surgical history, and problem list.    Compared to one year ago, the patient's physical   health is the same.  Compared to one year ago, the patient's mental   health is the same.    Recent Hospitalizations:  This patient has had a [facility], [place], operated by [facility] admission record on file within the last 365 days.  Current Medical Providers:  Patient Care Team:  Lane Puri MD as PCP - General (Family Medicine)  Ayaz Shah MD (Dermatology)  Eliot Hassan MD as Consulting Physician (Gastroenterology)  Nick Rodrigues MD (Psychiatry)  Tanja Pineda DO (Optometry)  Elissa Escalante APRN as Nurse Practitioner (Nurse Practitioner)  Juan José Palma MD as Consulting Physician (Urology)  Dionna Rees MD as Consulting Physician (Colon and Rectal Surgery)  Rosy Valenzuela PA-C as Physician Assistant (Colon and Rectal Surgery)  Bryan No MD as Consulting Physician (Cardiology)  Piotr Villegas MD as Consulting Physician (Urology)    Outpatient Medications Prior to Visit   Medication Sig Dispense Refill    apixaban (ELIQUIS) 5 MG tablet tablet Take 1 tablet by mouth 2 (Two) Times a Day. PT IS HOLDING FOR 2 DAYS BEFORE SURGERY PER CARDIOLOGY      aspirin 81 MG EC tablet Take 1 tablet by mouth Daily. INSTRUCTED PT TO FOLLOW MD INSTRUCTIONS REGARDING SURGERY      busPIRone (BUSPAR) 10 MG tablet Take 1 tablet by mouth Every 12 (Twelve) Hours.      LORazepam (ATIVAN) 1 MG tablet Take 1 tablet by mouth 3 times a day.      metoprolol succinate XL (TOPROL-XL) 25 MG 24 hr tablet Take 1 tablet by mouth 2 (Two) Times a Day.      tadalafil (CIALIS)  5 MG tablet Take 1 tablet by mouth Daily As Needed for Erectile Dysfunction. 60 minutes prior to sexual activity/HOLD FOR 48 HOUR BEFORE SURGERY      levothyroxine (SYNTHROID, LEVOTHROID) 150 MCG tablet Take 1 tablet by mouth Daily for 270 days. 90 tablet 2    montelukast (SINGULAIR) 10 MG tablet Take 1 tablet by mouth Every Evening for 270 days. 90 tablet 2    finasteride (PROSCAR) 5 MG tablet Take 1 tablet by mouth Daily. (Patient not taking: Reported on 1/16/2025)      nitroglycerin (NITROSTAT) 0.4 MG SL tablet Place 1 tablet under the tongue Every 5 (Five) Minutes As Needed. (Patient not taking: Reported on 1/16/2025)       No facility-administered medications prior to visit.     No opioid medication identified on active medication list. I have reviewed chart for other potential  high risk medication/s and harmful drug interactions in the elderly.      Aspirin is on active medication list. Aspirin use is indicated based on review of current medical condition/s. Pros and cons of this therapy have been discussed today. Benefits of this medication outweigh potential harm.  Patient has been encouraged to continue taking this medication.  .      Patient Active Problem List   Diagnosis    JAIDA (generalized anxiety disorder)    Paroxysmal atrial fibrillation    Depression    GERD (gastroesophageal reflux disease)    History of MI (myocardial infarction)    Other hyperlipidemia    Benign essential hypertension    Hypothyroidism, acquired    Sleep apnea    History of BPH    IFG (impaired fasting glucose)    Seasonal allergies    UTI (urinary tract infection)    Class 2 severe obesity due to excess calories with serious comorbidity and body mass index (BMI) of 38.0 to 38.9 in adult    Chronic anticoagulation    Gilbert's syndrome    Cigarette smoker    Stage 3a chronic kidney disease    Erectile dysfunction    Rectal carcinoma    Coronary artery disease involving native coronary artery of native heart without angina  "pectoris    Statin intolerance     Advance Care Planning Advance Directive is not on file.  ACP discussion was held with the patient during this visit. Patient does not have an advance directive, information provided.            Objective   Vitals:    25 1100   BP: 128/94   BP Location: Left arm   Patient Position: Sitting   Cuff Size: Large Adult   Pulse: 83   Temp: 97.6 °F (36.4 °C)   TempSrc: Oral   SpO2: 95%   Weight: (!) 137 kg (302 lb 3.2 oz)   Height: 188 cm (74\")   PainSc: 0-No pain       Estimated body mass index is 38.8 kg/m² as calculated from the following:    Height as of this encounter: 188 cm (74\").    Weight as of this encounter: 137 kg (302 lb 3.2 oz).                Does the patient have evidence of cognitive impairment? No  Lab Results   Component Value Date    CHLPL 103 01/10/2025    TRIG 74 01/10/2025    HDL 39 (L) 01/10/2025    LDL 49 01/10/2025    VLDL 15 01/10/2025    HGBA1C 5.80 (H) 01/10/2025                                                                                                Health  Risk Assessment    Smoking Status:  Social History     Tobacco Use   Smoking Status Every Day    Current packs/day: 1.50    Average packs/day: 1.5 packs/day for 40.0 years (60.0 ttl pk-yrs)    Types: Cigarettes    Passive exposure: Past   Smokeless Tobacco Never     Alcohol Consumption:  Social History     Substance and Sexual Activity   Alcohol Use No       Fall Risk Screen  STEADI Fall Risk Assessment was completed, and patient is at MODERATE risk for falls. Assessment completed on:2025    Depression Screening   Little interest or pleasure in doing things? Not at all   Feeling down, depressed, or hopeless? Not at all   PHQ-2 Total Score 0      Health Habits and Functional and Cognitive Screenin/16/2025    11:03 AM   Functional & Cognitive Status   Do you have difficulty preparing food and eating? No   Do you have difficulty bathing yourself, getting dressed or grooming yourself? " No   Do you have difficulty using the toilet? No   Do you have difficulty moving around from place to place? No   Do you have trouble with steps or getting out of a bed or a chair? No   Current Diet Frequent Junk Food   Dental Exam Up to date   Eye Exam Not up to date   Exercise (times per week) 3 times per week   Current Exercises Include Other   Do you need help using the phone?  No   Are you deaf or do you have serious difficulty hearing?  No   Do you need help to go to places out of walking distance? No   Do you need help shopping? No   Do you need help preparing meals?  No   Do you need help with housework?  No   Do you need help with laundry? No   Do you need help taking your medications? No   Do you need help managing money? No   Do you ever drive or ride in a car without wearing a seat belt? No   Have you felt unusual stress, anger or loneliness in the last month? No   Who do you live with? Spouse   If you need help, do you have trouble finding someone available to you? No   Have you been bothered in the last four weeks by sexual problems? No   Do you have difficulty concentrating, remembering or making decisions? No           Age-appropriate Screening Schedule:  Refer to the list below for future screening recommendations based on patient's age, sex and/or medical conditions. Orders for these recommended tests are listed in the plan section. The patient has been provided with a written plan.    Health Maintenance List  Health Maintenance   Topic Date Due    TDAP/TD VACCINES (1 - Tdap) Never done    ZOSTER VACCINE (2 of 3) 04/15/2013    LUNG CANCER SCREENING  12/01/2021    INFLUENZA VACCINE  07/01/2024    COVID-19 Vaccine (7 - 2024-25 season) 09/01/2024    RSV Vaccine - Adults (1 - 1-dose 75+ series) Never done    LIPID PANEL  01/10/2026    ANNUAL WELLNESS VISIT  01/16/2026    BMI FOLLOWUP  01/16/2026    HEPATITIS C SCREENING  Completed    Pneumococcal Vaccine 65+  Completed    AAA SCREEN ONCE  Completed     COLORECTAL CANCER SCREENING  Discontinued                                                                                                                                                CMS Preventative Services Quick Reference  Risk Factors Identified During Encounter  Fall Risk-High or Moderate: Information on Fall Prevention Shared in After Visit Summary and Sit to Stand Exercise Information Shared in After Visit Summary  Immunizations Discussed/Encouraged: Tdap, Influenza, Shingrix, COVID19, and RSV (Respiratory Syncytial Virus)  Inactivity/Sedentary: Patient was advised to exercise at least 150 minutes a week per CDC recommendations.    The above risks/problems have been discussed with the patient.  Pertinent information has been shared with the patient in the After Visit Summary.  An After Visit Summary and PPPS were made available to the patient.    Follow Up:   Next Medicare Wellness visit to be scheduled in 1 year.         Additional E&M Note during same encounter follows:  Patient has additional, significant, and separately identifiable condition(s)/problem(s) that require work above and beyond the Medicare Wellness Visit     Chief Complaint  Medicare Wellness-subsequent    Subjective    HPI  Tony is also being seen today for additional medical problem/s.       The patient presents for a medication refill visit.    He has been receiving Leqvio injections every 6 months, with the most recent one administered on 01/05/2025. He reports no significant changes in his physical or mental health compared to the previous year. He has experienced weight gain, which he attributes to a lack of physical activity due to inclement weather. He has reduced his smoking habit to half a pack per day and is making efforts to further decrease it. He has previously tried Chantix for smoking cessation without success. He has received the first dose of the shingles vaccine. He has an upcoming appointment with his urologist,   Nyla, on 01/28/2025, for bladder cancer surveillance. He has a history of Gilbert's syndrome. He has been diagnosed with anxiety and is currently on BuSpar and lorazepam, prescribed by a healthcare provider at [facility]. He is also on Eliquis and aspirin, prescribed by Dr. Castellano, with a follow-up appointment scheduled for 02/06/2025. He is requesting refills for Synthroid and metoprolol. He reports occasional morning anxiety, which resolves spontaneously. He experiences memory lapses, such as forgetting the purpose of entering a room, but these have not caused any significant issues. He has a history of falls, including a recent incident on a golf course.    He has a history of elevated creatinine kinase levels, which he attributes to his Leqvio injections. He has a history of Gilbert's syndrome.    He has a history of folic acid deficiency and is requesting a prescription for folic acid supplements. He does not take any vitamin B supplements.    He has a history of hypertension, with home readings typically ranging from 110 to 115 systolic and 80 to 89 diastolic. He recalls a previous episode of severe dizziness and bed rest for 3 days following the administration of a second dose of antihypertensive medication.    He has a history of chronic kidney disease, stage 3a. He is concerned that his kidney disease may be due to inadequate water intake. He has an upcoming appointment with his urologist, Dr. Redmond, on 01/28/2025, for bladder cancer surveillance. He reports frequent urination following water intake.    He has a history of impaired fasting glucose, with a blood glucose level of 116 and a hemoglobin A1c of 5.8. He has never been diagnosed with diabetes.    He has a history of atrial fibrillation, with a heart rate of 128/94. He is currently on Eliquis and aspirin, prescribed by Dr. Castellano, with a follow-up appointment scheduled for 02/06/2025.    He has a history of thyroid  "dysfunction, with a TSH level of 2.78. He is requesting a refill for Synthroid.    He has a history of anxiety, with occasional morning anxiety that resolves spontaneously. He is currently on BuSpar and lorazepam, prescribed by a healthcare provider at [facility].    SOCIAL HISTORY  - Smoking: Down to about half a pack of cigarettes a day, trying to cut down further    MEDICATIONS  - BuSpar  - Lorazepam  - Eliquis  - Aspirin  - Levothyroxine  - Metoprolol  - Montelukast  - Tadalafil  - Nitroglycerin    IMMUNIZATIONS  - Shingles vaccine: First dose received a long time ago          Objective   Vital Signs:  /94 (BP Location: Left arm, Patient Position: Sitting, Cuff Size: Large Adult)   Pulse 83   Temp 97.6 °F (36.4 °C) (Oral)   Ht 188 cm (74\")   Wt (!) 137 kg (302 lb 3.2 oz)   SpO2 95%   BMI 38.80 kg/m²   Physical Exam      General Appearance: No Acute Distress, normal appearance, well developed, well nourished.  Vital signs: Blood pressure is 120/94. BMI is 38.80.  Respiratory: Lungs are clear to auscultation. No wheezes, rhonchi, crackles, or rales heard. Normal respiratory effort.  Cardiovascular: Heart shows a little irregularity with atrial fibrillation, but no murmur, rub, or gallop.  Extremities: No pretibial edema, bilaterally.  Skin: Warm and dry, no rash.  Psychiatric: patient cooperative, normal affect.    The following data was reviewed by: Lane Puri MD on 01/16/2025:        Results  - Laboratory Studies:    - Sodium: 142    - Potassium: 4.7    - Chloride: 108    - Calcium: 8.9    - BUN: 18    - Creatinine: 1.44    - eGFR: 50.7    - Blood glucose: 116    - Hemoglobin A1c: 5.8    - Total bilirubin: 2.4    - TSH: 2.78    - B12 level: 262    - Folic acid level: Low    - Cholesterol: 103    - HDL: 39    - LDL cholesterol: Dropped from 62 to 49    - Triglycerides: Dropped from 84 to 74    - White blood cell count: 7.83    - Hemoglobin: 17.7              Assessment and Plan "        Medicare annual wellness visit, subsequent  Patient is due for several immunizations.  This information has been shared.  He would like to get these at his pharmacy.  Exercise and weight loss recommendations have been shared in the after visit summary.       Stage 3a chronic kidney disease    Recommendations to increase water intake 6 to 8 glasses of water daily.  Continue to monitor kidney dysfunction on yearly basis  Orders:    Comprehensive Metabolic Panel; Future    IFG (impaired fasting glucose)  Impaired fasting glucose is stable.  Continue with low carbohydrate diet.  Recommendation for 30 minutes of aerobic activity 30 minutes 5 days a week as tolerated.  Orders:    Comprehensive Metabolic Panel; Future    Hemoglobin A1c; Future    MicroAlbumin, Urine, Random - Urine, Clean Catch; Future    Microalbumin / Creatinine Urine Ratio - Urine, Clean Catch; Future    Hypothyroidism, acquired  Thyroid has shown interval improvement.  Continue same dose of medication.  Check labs at yearly.  Orders:    levothyroxine (SYNTHROID, LEVOTHROID) 150 MCG tablet; Take 1 tablet by mouth Daily.    TSH+Free T4; Future    Class 2 severe obesity due to excess calories with serious comorbidity and body mass index (BMI) of 38.0 to 38.9 in adult  Patient's (Body mass index is 38.8 kg/m².) indicates that they are morbidly/severely obese (BMI > 40 or > 35 with obesity - related health condition) with health conditions that include hypertension, impaired fasting glucose, and dyslipidemias . Weight is worsening. BMI  is above average; BMI management plan is completed. We discussed low calorie, low carb based diet program, portion control, increasing exercise, Weight Watchers or other Commercial based weight reduction program, and an paul-based approach such as [employer] Pal or Lose It.          Cigarette smoker  Recommendations to quit smoking.       Benign essential hypertension    Today's blood pressure is slightly elevated in  the office.  Patient states his blood pressure is controlled at home.  Continue same medicine.  He will follow-up blood pressure at his next cardio vascular visit with Dr. No  Orders:    Comprehensive Metabolic Panel; Future    CBC & Differential; Future    Paroxysmal atrial fibrillation  Condition is stable. The current medical regimen is effective;  continue present plan and medications.       Other hyperlipidemia     Lipids are much improved with Leqvio.  Continue this infusion twice yearly.  Orders:    Lipid Panel With / Chol / HDL Ratio; Future    CK; Future    Seasonal allergies  Condition is stable. The current medical regimen is effective;  continue present plan and medications.  Orders:    montelukast (SINGULAIR) 10 MG tablet; Take 1 tablet by mouth Every Evening.    Folic acid deficiency  Folic acid deficiency noted.  Start folic acid 1 mg tablet daily  Orders:    folic acid (FOLVITE) 1 MG tablet; Take 1 tablet by mouth Daily.    CBC & Differential; Future    Folate; Future    B12 deficiency  B12 levels are low normal.  Recommended starting with B12 tablet daily  Orders:    vitamin B-12 (CYANOCOBALAMIN) 1000 MCG tablet; Take 1 tablet by mouth Daily.    CBC & Differential; Future    Methylmalonic Acid, Serum; Future    Vitamin B12; Future    Macrocytosis without anemia  See above discussion  Orders:    folic acid (FOLVITE) 1 MG tablet; Take 1 tablet by mouth Daily.    vitamin B-12 (CYANOCOBALAMIN) 1000 MCG tablet; Take 1 tablet by mouth Daily.    CBC & Differential; Future    Methylmalonic Acid, Serum; Future    Vitamin B12; Future    Folate; Future    At moderate risk for fall  Information shared in the after visit summary       Weakness of both lower extremities    Orders:    PT Consult: Eval & Treat; Future              Follow Up   Return in about 1 year (around 1/21/2026) for Medicare Wellness & regular visit, [id number] min.  Patient was given instructions and counseling regarding his condition or  for health maintenance advice. Please see specific information pulled into the AVS if appropriate.  Patient or patient representative verbalized consent for the use of Ambient Listening during the visit with  Lane Puri MD for chart documentation. 1/16/2025  11:53 EST

## 2025-01-16 NOTE — PATIENT INSTRUCTIONS
Advance Care Planning and Advance Directives     You make decisions on a daily basis - decisions about where you want to live, your career, your home, your life. Perhaps one of the most important decisions you face is your choice for future medical care. Take time to talk with your family and your healthcare team and start planning today.  Advance Care Planning is a process that can help you:  Understand possible future healthcare decisions in light of your own experiences  Reflect on those decision in light of your goals and values  Discuss your decisions with those closest to you and the healthcare professionals that care for you  Make a plan by creating a document that reflects your wishes    Surrogate Decision Maker  In the event of a medical emergency, which has left you unable to communicate or to make your own decisions, you would need someone to make decisions for you.  It is important to discuss your preferences for medical treatment with this person while you are in good health.     Qualities of a surrogate decision maker:  Willing to take on this role and responsibility  Knows what you want for future medical care  Willing to follow your wishes even if they don't agree with them  Able to make difficult medical decisions under stressful circumstances    Advance Directives  These are legal documents you can create that will guide your healthcare team and decision maker(s) when needed. These documents can be stored in the electronic medical record.    Living Will - a legal document to guide your care if you have a terminal condition or a serious illness and are unable to communicate. States vary by statute in document names/types, but most forms may include one or more of the following:        -  Directions regarding life-prolonging treatments        -  Directions regarding artificially provided nutrition/hydration        -  Choosing a healthcare decision maker        -  Direction regarding organ/tissue  donation    Durable Power of  for Healthcare - this document names an -in-fact to make medical decisions for you, but it may also allow this person to make personal and financial decisions for you. Please seek the advice of an  if you need this type of document.    **Advance Directives are not required and no one may discriminate against you if you do not sign one.    Medical Orders  Many states allow specific forms/orders signed by your physician to record your wishes for medical treatment in your current state of health. This form, signed in personal communication with your physician, addresses resuscitation and other medical interventions that you may or may not want.      For more information or to schedule a time with a [facility] Advance Care Planning Facilitator contact: [facility] or call [phone] and someone will contact you directly.  Fall Prevention in the Home, Adult  Falls can cause injuries and affect people of all ages. There are many simple things that you can do to make your home safe and to help prevent falls.  If you need it, ask for help making these changes.  What actions can I take to prevent falls?  General information  Use good lighting in all rooms. Make sure to:  Replace any light bulbs that burn out.  Turn on lights if it is dark and use night-lights.  Keep items that you use often in easy-to-reach places. Lower the shelves around your home if needed.  Move furniture so that there are clear paths around it.  Do not keep throw rugs or other things on the floor that can make you trip.  If any of your floors are uneven, fix them.  Add color or contrast paint or tape to clearly fredi and help you see:  Grab bars or handrails.  First and last steps of staircases.  Where the edge of each step is.  If you use a ladder or stepladder:  Make sure that it is fully opened. Do not climb a closed ladder.  Make sure the sides of the ladder are locked in  place.  Have someone hold the ladder while you use it.  Know where your pets are as you move through your home.  What can I do in the bathroom?         Keep the floor dry. Clean up any water that is on the floor right away.  Remove soap buildup in the bathtub or shower. Buildup makes bathtubs and showers slippery.  Use non-skid mats or decals on the floor of the bathtub or shower.  Attach bath mats securely with double-sided, non-slip rug tape.  If you need to sit down while you are in the shower, use a non-slip stool.  Install grab bars by the toilet and in the bathtub and shower. Do not use towel bars as grab bars.  What can I do in the bedroom?  Make sure that you have a light by your bed that is easy to reach.  Do not use any sheets or blankets on your bed that hang to the floor.  Have a firm bench or chair with side arms that you can use for support when you get dressed.  What can I do in the kitchen?  Clean up any spills right away.  If you need to reach something above you, use a sturdy step stool that has a grab bar.  Keep electrical cables out of the way.  Do not use floor polish or wax that makes floors slippery.  What can I do with my stairs?  Do not leave anything on the stairs.  Make sure that you have a light switch at the top and the bottom of the stairs. Have them installed if you do not have them.  Make sure that there are handrails on both sides of the stairs. Fix handrails that are broken or loose. Make sure that handrails are as long as the staircases.  Install non-slip stair treads on all stairs in your home if they do not have carpet.  Avoid having throw rugs at the top or bottom of stairs, or secure the rugs with carpet tape to prevent them from moving.  Choose a carpet design that does not hide the edge of steps on the stairs. Make sure that carpet is firmly attached to the stairs. Fix any carpet that is loose or worn.  What can I do on the outside of my home?  Use bright outdoor  lighting.  Repair the edges of walkways and driveways and fix any cracks. Clear paths of anything that can make you trip, such as tools or rocks.  Add color or contrast paint or tape to clearly fredi and help you see high doorway thresholds.  Trim any bushes or trees on the main path into your home.  Check that handrails are securely fastened and in good repair. Both sides of all steps should have handrails.  Install guardrails along the edges of any raised decks or porches.  Have leaves, snow, and ice cleared regularly. Use sand, salt, or ice melt on walkways during winter months if you live where there is ice and snow.  In the garage, clean up any spills right away, including grease or oil spills.  What other actions can I take?  Review your medicines with your health care provider. Some medicines can make you confused or feel dizzy. This can increase your chance of falling.  Wear closed-toe shoes that fit well and support your feet. Wear shoes that have rubber soles and low heels.  Use a cane, walker, scooter, or crutches that help you move around if needed.  Talk with your provider about other ways that you can decrease your risk of falls. This may include seeing a physical therapist to learn to do exercises to improve movement and strength.  Where to find more information  Centers for Disease Control and PreventionMOMO: cdc.gov  National [place] on Aging: humphrey.nih.gov  National [place] on Aging: humph[place].nih.gov  Contact a health care provider if:  You are afraid of falling at home.  You feel weak, drowsy, or dizzy at home.  You fall at home.  Get help right away if you:  Lose consciousness or have trouble moving after a fall.  Have a fall that causes a head injury.  These symptoms may be an emergency. Get help right away. Call 911.  Do not wait to see if the symptoms will go away.  Do not drive yourself to the hospital.  This information is not intended to replace advice given to you by your health care  provider. Make sure you discuss any questions you have with your health care provider.  Document Revised: 08/21/2023 Document Reviewed: 08/21/2023  Elsevier Patient Education © 2024 [employer]  Sit-to-Stand Exercise    The sit-to-stand exercise (also known as the chair stand or chair rise exercise) strengthens your lower body and helps you maintain or improve your mobility and independence. The end goal is to do the sit-to-stand exercise without using your hands. This will be easier as you become stronger. You should always talk with your health care provider before starting any exercise program, especially if you have had recent surgery.  Do the exercise exactly as told by your health care provider and adjust it as directed. It is normal to feel mild stretching, pulling, tightness, or discomfort as you do this exercise, but you should stop right away if you feel sudden pain or your pain gets worse. Do not begin doing this exercise until told by your health care provider.  What the sit-to-stand exercise does  The sit-to-stand exercise helps to strengthen the muscles in your thighs and the muscles in the center of your body that give you stability (core muscles). This exercise is especially helpful if:  You have had knee or hip surgery.  You have trouble getting up from a chair, out of a car, or off the toilet due to muscle weakness.  How to do the sit-to-stand exercise  Sit toward the front edge of a sturdy chair without armrests. Your knees should be bent and your feet should be flat on the floor and shoulder-width apart and underneath your hips.  Place your hands lightly on each side of the seat. Keep your back and neck as straight as possible, with your chest slightly forward.  Breathe in slowly. Lean forward and slightly shift your weight to the front of your feet.  Breathe out as you slowly stand up. Try not to support any weight with your hands.  Stand and pause for a full breath in and out.  Breathe in as  you sit down slowly. Tighten your core and abdominal muscles to control your lowering as much as possible. You should lower yourself back to the chair slowly, not just drop back into the seat.  Breathe out slowly.  Do this exercise 10-15 times. If needed, do it fewer times until you build up strength.  Rest for 1 minute, then do another set of 10-15 repetitions.  To change the difficulty of the sit-to-stand exercise  If the exercise is too difficult, use a chair with sturdy armrests, and push off the armrests to help you come to the standing position. You can also use the armrests to help slowly lower yourself back to sitting. As this gets easier, try to use your arms less. You can also place a firm cushion or pillow on the chair to make the surface higher.  If this exercise is too easy, do not use your arms to help raise or lower yourself. You can also wear a weighted vest, use hand weights, increase your repetitions, or try a lower chair.  General tips  You may feel tired when starting an exercise routine. This is normal.  You may have muscle soreness that lasts a few days. This is normal. As you get stronger, you may not feel muscle soreness.  Use smooth, steady movements.  Do not  hold your breath during strength exercises. This can cause unsafe changes in your blood pressure.  Breathe in slowly through your nose, and breathe out slowly through your mouth.  Summary  Strengthening your lower body is an important step to help you move safely and independently.  The sit-to-stand exercise helps strengthen the muscles in your thighs and core.  You should always talk with your health care provider before starting any exercise program, especially if you have had recent surgery.  This information is not intended to replace advice given to you by your health care provider. Make sure you discuss any questions you have with your health care provider.  Document Revised: 04/10/2022 Document Reviewed: 04/10/2022  [place]  Patient Education © 2024 [employer]  You should get an annual flu vaccination every fall. (This will help to protect you against viral influenza illness) Please  get the immunization at your local pharmacy or schedule at our office at your earliest convenience.  Please click on the link for more information about this vaccine.   https://www.cdc.gov/flu/prevent/flushot.htm      You are due for adacel Tdap vaccination. (provides protection against tetanus, diptheria and whooping cough) Please  get the immunization at your local pharmacy at your earliest convenience.  Please click on the link for more information about this vaccine.    https://www.cdc.gov/vaccines/vpd/dtap-tdap-td/public/index.html    You are due for Shingrix vaccination series ( the newest shingles vaccine).  It is a two shot series spaced 2-6 months apart. Please get this vaccine series started at your earliest convenience at your local pharmacy to help avoid shingles outbreak. It is more effective than the old Zostavax vaccine and is recommended even if you have had the Zostavax vaccine in the past.  Once the Shingrix series is completed, it does not need to be repeated.   For more information, please look at the website below:  https://www.cdc.gov/vaccines/vpd/shingles/public/shingrix/index.html    You are due for RSV vaccination. (provides protection against Respiratory Syncytial Virus Infection) Please  get the immunization at your local pharmacy at your earliest convenience. This immunization is currently a one time vaccine only. Please click on the link for more information about this vaccine.   https://www.cdc.gov/rsv/vaccines/older-adults.html    You are due for a Covid 19 vaccination. (provides protection against Covid 19 Viral Infection) Please  talk to your pharmacist and get the immunization at your local pharmacy at your earliest convenience. Please click on the link for more information about this vaccine.  "  https://www.cdc.gov/coronavirus/2019-ncov/vaccines/stay-up-to-date.html      You are due for the adacel Tdap(tetanus plus whooping cough vaccine).  Please get the immunization at your local pharmacy. (Once every 10 Years).    Tdap (Tetanus, Diphtheria, Pertussis) Vaccine: What You Need to Know  1. Why get vaccinated?  Tdap vaccine can prevent tetanus, diphtheria, and pertussis.  Diphtheria and pertussis spread from person to person. Tetanus enters the body through cuts or wounds.  TETANUS (T) causes painful stiffening of the muscles. Tetanus can lead to serious health problems, including being unable to open the mouth, having trouble swallowing and breathing, or death.  DIPHTHERIA (D) can lead to difficulty breathing, heart failure, paralysis, or death.  PERTUSSIS (aP), also known as \"whooping cough,\" can cause uncontrollable, violent coughing that makes it hard to breathe, eat, or drink. Pertussis can be extremely serious especially in babies and young children, causing pneumonia, convulsions, brain damage, or death. In teens and adults, it can cause weight loss, loss of bladder control, passing out, and rib fractures from severe coughing.  2. Tdap vaccine  Tdap is only for children 7 years and older, adolescents, and adults.   Adolescents should receive a single dose of Tdap, preferably at age 11 or 12 years.  Pregnant people should get a dose of Tdap during every pregnancy, preferably during the early part of the third trimester, to help protect the  from pertussis. Infants are most at risk for severe, life-threatening complications from pertussis.  Adults who have never received Tdap should get a dose of Tdap.  Also, adults should receive a booster dose of either Tdap or Td (a different vaccine that protects against tetanus and diphtheria but not pertussis) every 10 years, or after 5 years in the case of a severe or dirty wound or burn.  Tdap may be given at the same time as other vaccines.  3. Talk " "with your health care provider  Tell your vaccine provider if the person getting the vaccine:  Has had an allergic reaction after a previous dose of any vaccine that protects against tetanus, diphtheria, or pertussis, or has any severe, life-threatening allergies  Has had a coma, decreased level of consciousness, or prolonged seizures within 7 days after a previous dose of any pertussis vaccine (DTP, DTaP, or Tdap)  Has seizures or another nervous system problem  Has ever had Guillain-Barré Syndrome (also called \"GBS\")  Has had severe pain or swelling after a previous dose of any vaccine that protects against tetanus or diphtheria  In some cases, your health care provider may decide to postpone Tdap vaccination until a future visit.  People with minor illnesses, such as a cold, may be vaccinated. People who are moderately or severely ill should usually wait until they recover before getting Tdap vaccine.   Your health care provider can give you more information.  4. Risks of a vaccine reaction  Pain, redness, or swelling where the shot was given, mild fever, headache, feeling tired, and nausea, vomiting, diarrhea, or stomachache sometimes happen after Tdap vaccination.  People sometimes faint after medical procedures, including vaccination. Tell your provider if you feel dizzy or have vision changes or ringing in the ears.   As with any medicine, there is a very remote chance of a vaccine causing a severe allergic reaction, other serious injury, or death.  5. What if there is a serious problem?  An allergic reaction could occur after the vaccinated person leaves the clinic. If you see signs of a severe allergic reaction (hives, swelling of the face and throat, difficulty breathing, a fast heartbeat, dizziness, or weakness), call 9-1-1 and get the person to the nearest hospital.  For other signs that concern you, call your health care provider.   Adverse reactions should be reported to the Vaccine Adverse Event " Reporting System (VAERS). Your health care provider will usually file this report, or you can do it yourself. Visit the VAERS website at www.vaers.[facility].gov or call 1-423.748.7904. VAERS is only for reporting reactions, and VAERS staff members do not give medical advice.  6. The National Vaccine Injury Compensation Program  The National Vaccine Injury Compensation Program (VICP) is a federal program that was created to compensate people who may have been injured by certain vaccines. Claims regarding alleged injury or death due to vaccination have a time limit for filing, which may be as short as two years. Visit the VICP website at www.[facility].gov/vaccinecompensation or call 1-158.451.3980 to learn about the program and about filing a claim.  7. How can I learn more?  Ask your health care provider.  Call your local or state health department.  Visit the website of the Food and Drug Administration (FDA) for vaccine package inserts and additional information at www.fda.gov/vaccines-blood-biologics/vaccines.  Contact the Centers for Disease Control and Prevention (CDC):  Call 1-987.330.6314 (4-712-ZRD-INFO) or  Visit CDC's website at www.cdc.gov/vaccines.  Source: CDC Vaccine Information Statement Tdap (Tetanus, Diphtheria, Pertussis) Vaccine (08/06/2021)  This same material is available at www.cdc.gov for no charge.  This information is not intended to replace advice given to you by your health care provider. Make sure you discuss any questions you have with your health care provider.  Document Revised: 11/15/2022 Document Reviewed: 09/19/2022  Elsevier Patient Education © 2023 Elsevier Inc.     You are due for Shingrix vaccination series to prevent Shingles. Please get the immunization at your local pharmacy. It is more effective than the old Zostavax vaccine and is recommended even if you have had the Zostavax vaccine in the past. For more information, please look at the website  below:  https://www.cdc.gov/vaccines/vpd/shingles/public/shingrix/index.html    Shingrix Vaccine Injection  What is this medication?  ZOSTER VACCINE (ZOS ter vak SEEN) reduces the risk of herpes zoster (shingles). It does not treat shingles. It is still possible to get shingles after receiving the vaccine, but the symptoms may be less severe or not last as long. It works by helping your immune system learn how to fight off a future infection.  This medicine may be used for other purposes; ask your health care provider or pharmacist if you have questions.  COMMON BRAND NAME(S): SHINGRIX  What should I tell my care team before I take this medication?  They need to know if you have any of these conditions:  Cancer  Immune system problems  An unusual or allergic reaction to Zoster vaccine, other medications, foods, dyes, or preservatives  Pregnant or trying to get pregnant  Breastfeeding  How should I use this medication?  This vaccine is injected into a muscle. It is given by your care team.  This vaccine requires 2 doses to get the full benefit. Set a reminder for when your next dose is due.  A copy of Vaccine Information Statements will be given before each vaccination. Be sure to read this information carefully each time. This sheet may change often.  Talk to your care team about the use of this vaccine in children. This vaccine is not approved for use in children.  Overdosage: If you think you have taken too much of this medicine contact a poison control center or emergency room at once.  NOTE: This medicine is only for you. Do not share this medicine with others.  What if I miss a dose?  Keep appointments for follow-up (booster) doses. It is important not to miss your dose. Call your care team if you are unable to keep an appointment.  What may interact with this medication?  Medications that suppress your immune system  Medications to treat cancer  Steroid medications, such as prednisone or cortisone  This list  may not describe all possible interactions. Give your health care provider a list of all the medicines, herbs, non-prescription drugs, or dietary supplements you use. Also tell them if you smoke, drink alcohol, or use illegal drugs. Some items may interact with your medicine.  What should I watch for while using this medication?  Visit your care team regularly.  This vaccine, like all vaccines, may not fully protect everyone.  What side effects may I notice from receiving this medication?  Side effects that you should report to your care team as soon as possible:  Allergic reactions--skin rash, itching, hives, swelling of the face, lips, tongue, or throat  Side effects that usually do not require medical attention (report these to your care team if they continue or are bothersome):  Chills  Fatigue  Feeling faint or lightheaded  Fever  Headache  Muscle pain  Pain, redness, or irritation at injection site  This list may not describe all possible side effects. Call your doctor for medical advice about side effects. You may report side effects to FDA at 0-902-HKR-8666.  Where should I keep my medication?  This vaccine is only given by your care team. It will not be stored at home.  NOTE: This sheet is a summary. It may not cover all possible information. If you have questions about this medicine, talk to your doctor, pharmacist, or health care provider.  © 2023 Elsevier/Gold Standard (2023-06-08 00:00:00)     Annual flu shot has been recommended to patient. If you are age 65 or greater, then get the high dose influenza vaccination. Optimal timing of this vaccination is in mid October of each year.    Influenza (Flu) Vaccine (Inactivated or Recombinant): What You Need to Know  1. Why get vaccinated?  Influenza vaccine can prevent influenza (flu).  Flu is a contagious disease that spreads around the [place] every year, usually between October and May. Anyone can get the flu, but it is more dangerous for some  "people. Infants and young children, people 65 years and older, pregnant people, and people with certain health conditions or a weakened immune system are at greatest risk of flu complications.  Pneumonia, bronchitis, sinus infections, and ear infections are examples of flu-related complications. If you have a medical condition, such as heart disease, cancer, or diabetes, flu can make it worse.  Flu can cause fever and chills, sore throat, muscle aches, fatigue, cough, headache, and runny or stuffy nose. Some people may have vomiting and diarrhea, though this is more common in children than adults.  In an average year, thousands of people in the [place] die from flu, and many more are hospitalized. Flu vaccine prevents millions of illnesses and flu-related visits to the doctor each year.  2. Influenza vaccines  CDC recommends everyone 6 months and older get vaccinated every flu season. Children 6 months through 8 years of age may need 2 doses during a single flu season. Everyone else needs only 1 dose each flu season.  It takes about 2 weeks for protection to develop after vaccination.  There are many flu viruses, and they are always changing. Each year a new flu vaccine is made to protect against the influenza viruses believed to be likely to cause disease in the upcoming flu season. Even when the vaccine doesn't exactly match these viruses, it may still provide some protection.  Influenza vaccine does not cause flu.  Influenza vaccine may be given at the same time as other vaccines.  3. Talk with your health care provider  Tell your vaccination provider if the person getting the vaccine:  Has had an allergic reaction after a previous dose of influenza vaccine, or has any severe, life-threatening allergies  Has ever had Guillain-Barré Syndrome (also called \"GBS\")  In some cases, your health care provider may decide to postpone influenza vaccination until a future visit.  Influenza vaccine can be administered " at any time during pregnancy. People who are or will be pregnant during influenza season should receive inactivated influenza vaccine.  People with minor illnesses, such as a cold, may be vaccinated. People who are moderately or severely ill should usually wait until they recover before getting influenza vaccine.  Your health care provider can give you more information.  4. Risks of a vaccine reaction  Soreness, redness, and swelling where the shot is given, fever, muscle aches, and headache can happen after influenza vaccination.  There may be a very small increased risk of Guillain-Barré Syndrome (GBS) after inactivated influenza vaccine (the flu shot).  Young children who get the flu shot along with pneumococcal vaccine (PCV13) and/or DTaP vaccine at the same time might be slightly more likely to have a seizure caused by fever. Tell your health care provider if a child who is getting flu vaccine has ever had a seizure.  People sometimes faint after medical procedures, including vaccination. Tell your provider if you feel dizzy or have vision changes or ringing in the ears.  As with any medicine, there is a very remote chance of a vaccine causing a severe allergic reaction, other serious injury, or death.  5. What if there is a serious problem?  An allergic reaction could occur after the vaccinated person leaves the clinic. If you see signs of a severe allergic reaction (hives, swelling of the face and throat, difficulty breathing, a fast heartbeat, dizziness, or weakness), call 9-1-1 and get the person to the nearest hospital.  For other signs that concern you, call your health care provider.  Adverse reactions should be reported to the Vaccine Adverse Event Reporting System (VAERS). Your health care provider will usually file this report, or you can do it yourself. Visit the VAERS website at www.vaers.hhs.gov or call 1-483.844.6574. VAERS is only for reporting reactions, and VAERS staff members do not give  medical advice.  6. The National Vaccine Injury Compensation Program  The National Vaccine Injury Compensation Program (VICP) is a federal program that was created to compensate people who may have been injured by certain vaccines. Claims regarding alleged injury or death due to vaccination have a time limit for filing, which may be as short as two years. Visit the VICP website at www.hrsa.gov/vaccinecompensation or call 1-887.808.1795 to learn about the program and about filing a claim.  7. How can I learn more?  Ask your health care provider.  Call your local or state health department.  Visit the website of the Food and Drug Administration (FDA) for vaccine package inserts and additional information at www.fda.gov/vaccines-blood-biologics/vaccines.  Contact the Centers for Disease Control and Prevention (CDC):  Call 1-165.283.3487 (0-950-HJF-INFO) or  Visit CDC's website at www.cdc.gov/flu.  Source: CDC Vaccine Information Statement Inactivated Influenza Vaccine (8/6/2021)  This same material is available at www.cdc.gov for no charge.  This information is not intended to replace advice given to you by your health care provider. Make sure you discuss any questions you have with your health care provider.  Document Revised: 11/15/2022 Document Reviewed: 09/08/2022  Elsevier Patient Education © 2023 [employer]     RSV vaccine is recommended to prevent Respiratory syncytial virus infection. It is recommended for adults over age 60. Please get this vaccination at your local pharmacy.  I have included some information about this infection for your consideration.     Respiratory Syncytial Virus Infection, Adult  Respiratory syncytial virus (RSV) infection is an infection caused by RSV, a common virus. This virus is similar to viruses that cause the common cold and the flu. RSV infection can affect the nose, throat, windpipe, and lungs (respiratory system). When the infection is severe, it can cause:  Bronchiolitis.  This condition causes inflammation of the air passages in the lungs (bronchioles).  Pneumonia. This condition causes inflammation of the air sacs in the lungs.  RSV infection spreads from person to person (is contagious) through droplets from coughs and sneezes (respiratory secretions). This condition is rarely serious when it occurs in adults.  What are the causes?  This condition is caused by contact with RSV. This can happen by:  Breathing respiratory secretions from someone who has the infection.  Touching something that has been exposed to the virus (is contaminated) and then touching your mouth, nose, or eyes.  Coming in close contact with someone who has this infection. This may happen if you:  Hug or kiss.  Shake or hold hands.  Eat or drink using the same dishes or utensils.  What increases the risk?  The following factors may make you more likely to develop this condition:  Being 65 years of age or older.  Having certain health conditions, including:  A long-term (chronic) lung condition, such as chronic obstructive pulmonary disease (COPD).  An immune system that is weak. This is your body's defense system.  Down syndrome.  Heart disease.  Working in a hospital or other health care facility.  Living in a long-term health care facility.  RSV infections are most common from the months of November to April, but they can happen any time of year.  What are the signs or symptoms?  Symptoms of this condition include:  Having a runny nose.  Coughing. You may have a cough that brings up mucus (productive cough).  Sneezing.  Having a fever.  Wanting to eat less than usual.  Breathing loudly (wheezing).  Having shortness of breath.  Having fluid build up in the lungs (respiratory distress).  How is this diagnosed?  This condition may be diagnosed based on:  Your symptoms.  Your medical history.  A physical exam.  A chest X-ray to rule out pneumonia.  Blood tests or tests of mucus from your lungs (sputum). These  tests may be done for older adults.  A test of a sample of your respiratory secretions.  How is this treated?  In most cases, the RSV infection will go away after 1-2 weeks of caring for yourself at home.   Sometimes, RSV infection is severe and can cause bronchiolitis or pneumonia. If you develop one or both of these conditions, you may need to be treated in the hospital. You may be given:  Oxygen therapy.  Antiviral medicine.  Medicines to open your bronchioles (bronchodilators).  Follow these instructions at home:  Medicines  Take over-the-counter and prescription medicines only as told by your health care provider.  If you were prescribed an antiviral medicine, take it as told by your health care provider. Do not stop using the antiviral even if you start to feel better.  Lifestyle    Eat a healthy diet.  Do not drink alcohol.  Do not use any products that contain nicotine or tobacco, such as cigarettes, e-cigarettes, and chewing tobacco. If you need help quitting, ask your health care provider.  Rest at home until your symptoms go away.  Return to your normal activities as told by your health care provider. Ask your health care provider what activities are safe for you.  General instructions    Drink enough fluid to keep your urine pale yellow.  Gargle with a salt-water mixture 3-4 times a day or as needed. To make a salt-water mixture, completely dissolve ½-1 tsp (3-6 g) of salt in 1 cup (237 mL) of warm water.  Keep all follow-up visits as told by your health care provider. This is important.  How is this prevented?  To prevent catching and spreading RSV:  Wash your hands often with soap and water for at least 20 seconds. If soap and water are not available, use hand . Do not touch your face without first cleaning your hands.  Stay home if you have symptoms of the common cold or the flu.  Cover your nose and mouth when you cough or sneeze.  Avoid large groups of people.  Keep a safe distance of about  6 feet (1.8 m) from people who are coughing or sneezing.  Where to find more information  Centers for Disease Control and Prevention: www.cdc.gov  Contact a health care provider if:  Your symptoms get worse or have not changed after 2 weeks.  You have:  A fever.  Hot flashes, sweating, or chills that keep happening.  A cough that brings up much more mucus than usual.  A cough that brings up blood.  You feel:  Very tired (lethargic).  Confused.  Get help right away if:  You have increased or severe trouble breathing.  You lose consciousness.  These symptoms may represent a serious problem that is an emergency. Do not wait to see if the symptoms will go away. Get medical help right away. Call your local emergency services (911 in the [place] Do not drive yourself to the hospital.  Summary  Respiratory syncytial virus (RSV) infection is an infection caused by RSV, a common virus. RSV infection can affect the nose, throat, windpipe, and lungs (respiratory system).  When the infection is severe, it can cause bronchiolitis or pneumonia.  Take over-the-counter and prescription medicines only as told by your health care provider.  Contact a health care provider if your symptoms get worse or have not changed after 2 weeks.  This information is not intended to replace advice given to you by your health care provider. Make sure you discuss any questions you have with your health care provider.  Document Revised: 09/30/2020 Document Reviewed: 10/07/2020  [employer] Patient Education © 2022 [employer]     You are due for Covid booster. Please get this vaccine at your local pharmacy. COVID-19 vaccine may be administered without regards to timing of other vaccines.  If administering on the same day as another vaccine, administer in a different site (arm).  COVID-19 VACCINE reduces the risk of COVID-19. It does not treat COVID-19. It is still possible to get COVID-19 after receiving this vaccine, but the symptoms may be less severe  or not last as long. It works by helping your immune system learn how to fight off a future infection.     What side effects may I notice from receiving this medication?  Side effects that you should report to your care team as soon as possible:  Allergic reactions--skin rash, itching, hives, swelling of the face, lips, tongue, or throat  Heart muscle inflammation--unusual weakness or fatigue, shortness of breath, chest pain, fast or irregular heartbeat, dizziness, swelling of the ankles, feet, or hands  Side effects that usually do not require medical attention (report these to your care team if they continue or are bothersome):  Chills  Fatigue  Fever  Headache  Joint pain  Muscle pain  Nausea  Pain, redness, or irritation at injection site  Swollen lymph nodes  Vomiting  This list may not describe all possible side effects. Call your doctor for medical advice about side effects. You may report side effects to FDA at 2-825-FDA-3516.     Exercising to Lose Weight  Getting regular exercise is important for everyone. It is especially important if you are overweight. Being overweight increases your risk of heart disease, stroke, diabetes, high blood pressure, and several types of cancer. Exercising, and reducing the calories you consume, can help you lose weight and improve fitness and health.  Exercise can be moderate or vigorous intensity. To lose weight, most people need to do a certain amount of moderate or vigorous-intensity exercise each week.  How can exercise affect me?  You lose weight when you exercise enough to burn more calories than you eat. Exercise also reduces body fat and builds muscle. The more muscle you have, the more calories you burn. Exercise also:  Improves mood.  Reduces stress and tension.  Improves your overall fitness, flexibility, and endurance.  Increases bone strength.  Moderate-intensity exercise    Moderate-intensity exercise is any activity that gets you moving enough to burn at least  three times more energy (calories) than if you were sitting.  Examples of moderate exercise include:  Walking a mile in 15 minutes.  Doing light yard work.  Biking at an easy pace.  Most people should get at least 150 minutes of moderate-intensity exercise a week to maintain their body weight.  Vigorous-intensity exercise  Vigorous-intensity exercise is any activity that gets you moving enough to burn at least six times more calories than if you were sitting. When you exercise at this intensity, you should be working hard enough that you are not able to carry on a conversation.  Examples of vigorous exercise include:  Running.  Playing a team sport, such as football, basketball, and soccer.  Jumping rope.  Most people should get at least 75 minutes a week of vigorous exercise to maintain their body weight.  What actions can I take to lose weight?  The amount of exercise you need to lose weight depends on:  Your age.  The type of exercise.  Any health conditions you have.  Your overall physical ability.  Talk to your health care provider about how much exercise you need and what types of activities are safe for you.  Nutrition    Make changes to your diet as told by your health care provider or diet and nutrition specialist (dietitian). This may include:  Eating fewer calories.  Eating more protein.  Eating less unhealthy fats.  Eating a diet that includes fresh fruits and vegetables, whole grains, low-fat dairy products, and lean protein.  Avoiding foods with added fat, salt, and sugar.  Drink plenty of water while you exercise to prevent dehydration or heat stroke.  Activity  Choose an activity that you enjoy and set realistic goals. Your health care provider can help you make an exercise plan that works for you.  Exercise at a moderate or vigorous intensity most days of the week.  The intensity of exercise may vary from person to person. You can tell how intense a workout is for you by paying attention to your  breathing and heartbeat. Most people will notice their breathing and heartbeat get faster with more intense exercise.  Do resistance training twice each week, such as:  Push-ups.  Sit-ups.  Lifting weights.  Using resistance bands.  Getting short amounts of exercise can be just as helpful as long, structured periods of exercise. If you have trouble finding time to exercise, try doing these things as part of your daily routine:  Get up, stretch, and walk around every 30 minutes throughout the day.  Go for a walk during your lunch break.  Park your car farther away from your destination.  If you take public transportation, get off one stop early and walk the rest of the way.  Make phone calls while standing up and walking around.  Take the stairs instead of elevators or escalators.  Wear comfortable clothes and shoes with good support.  Do not exercise so much that you hurt yourself, feel dizzy, or get very short of breath.  Where to find more information  [place] Department of Health and Human Services: www.hhs.gov  Centers for Disease Control and Prevention: www.cdc.gov  Contact a health care provider:  Before starting a new exercise program.  If you have questions or concerns about your weight.  If you have a medical problem that keeps you from exercising.  Get help right away if:  You have any of the following while exercising:  Injury.  Dizziness.  Difficulty breathing or shortness of breath that does not go away when you stop exercising.  Chest pain.  Rapid heartbeat.  These symptoms may represent a serious problem that is an emergency. Do not wait to see if the symptoms will go away. Get medical help right away. Call your local emergency services (911 in the [place] Do not drive yourself to the hospital.  Summary  Getting regular exercise is especially important if you are overweight.  Being overweight increases your risk of heart disease, stroke, diabetes, high blood pressure, and several types of cancer.  Losing  weight happens when you burn more calories than you eat.  Reducing the amount of calories you eat, and getting regular moderate or vigorous exercise each week, helps you lose weight.  This information is not intended to replace advice given to you by your health care provider. Make sure you discuss any questions you have with your health care provider.  Document Revised: 02/13/2022 Document Reviewed: 02/13/2022  [employer] Patient Education © 2024 [employer]Carbohydrate Counting for Diabetes Mellitus, Adult  Carbohydrate counting is a method of keeping track of how many carbohydrates you eat. Eating carbohydrates increases the amount of sugar (glucose) in the blood. Counting how many carbohydrates you eat improves how well you manage your blood glucose. This, in turn, helps you manage your diabetes.  Carbohydrates are measured in grams (g) per serving. It is important to know how many carbohydrates (in grams or by serving size) you can have in each meal. This is different for every person. A dietitian can help you make a meal plan and calculate how many carbohydrates you should have at each meal and snack.  What foods contain carbohydrates?  Carbohydrates are found in the following foods:  Grains, such as breads and cereals.  Dried beans and soy products.  Starchy vegetables, such as potatoes, peas, and corn.  Fruit and fruit juices.  Milk and yogurt.  Sweets and snack foods, such as cake, cookies, candy, chips, and soft drinks.  How do I count carbohydrates in foods?  There are two ways to count carbohydrates in food. You can read food labels or learn standard serving sizes of foods. You can use either of these methods or a combination of both.  Using the Nutrition Facts label  The Nutrition Facts list is included on the labels of almost all packaged foods and beverages in the [place]. It includes:  The serving size.  Information about nutrients in each serving, including the grams of carbohydrate per  serving.  To use the Nutrition Facts, decide how many servings you will have. Then, multiply the number of servings by the number of carbohydrates per serving. The resulting number is the total grams of carbohydrates that you will be having.  Learning the standard serving sizes of foods  When you eat carbohydrate foods that are not packaged or do not include Nutrition Facts on the label, you need to measure the servings in order to count the grams of carbohydrates.  Measure the foods that you will eat with a food scale or measuring cup, if needed.  Decide how many standard-size servings you will eat.  Multiply the number of servings by 15. For foods that contain carbohydrates, one serving equals 15 g of carbohydrates.  For example, if you eat 2 cups or 10 oz (300 g) of strawberries, you will have eaten 2 servings and 30 g of carbohydrates (2 servings x 15 g = 30 g).  For foods that have more than one food mixed, such as soups and casseroles, you must count the carbohydrates in each food that is included.  The following list contains standard serving sizes of common carbohydrate-rich foods. Each of these servings has about 15 g of carbohydrates:  1 slice of bread.  1 six-inch (15 cm) tortilla.  ? cup or 2 oz (53 g) cooked rice or pasta.  ½ cup or 3 oz (85 g) cooked or canned, drained and rinsed beans or lentils.  ½ cup or 3 oz (85 g) starchy vegetable, such as peas, corn, or squash.  ½ cup or 4 oz (120 g) hot cereal.  ½ cup or 3 oz (85 g) boiled or mashed potatoes, or ¼ or 3 oz (85 g) of a large baked potato.  ½ cup or 4 fl oz (118 mL) fruit juice.  1 cup or 8 fl oz (237 mL) milk.  1 small or 4 oz (106 g) apple.  ½ or 2 oz (63 g) of a medium banana.  1 cup or 5 oz (150 g) strawberries.  3 cups or 1 oz (28.3 g) popped popcorn.  What is an example of carbohydrate counting?  To calculate the grams of carbohydrates in this sample meal, follow the steps shown below.  Sample meal  3 oz (85 g) chicken breast.  ? cup or 4 oz  (106 g) brown rice.  ½ cup or 3 oz (85 g) corn.  1 cup or 8 fl oz (237 mL) milk.  1 cup or 5 oz (150 g) strawberries with sugar-free whipped topping.  Carbohydrate calculation  Identify the foods that contain carbohydrates:  Rice.  Corn.  Milk.  Strawberries.  Calculate how many servings you have of each food:  2 servings rice.  1 serving corn.  1 serving milk.  1 serving strawberries.  Multiply each number of servings by 15  servings rice x 15 g = 30 g.  1 serving corn x 15 g = 15 g.  1 serving milk x 15 g = 15 g.  1 serving strawberries x 15 g = 15 g.  Add together all of the amounts to find the total grams of carbohydrates eaten:  30 g + 15 g + 15 g + 15 g = 75 g of carbohydrates total.  What are tips for following this plan?  Shopping  Develop a meal plan and then make a shopping list.  Buy fresh and frozen vegetables, fresh and frozen fruit, dairy, eggs, beans, lentils, and whole grains.  Look at food labels. Choose foods that have more fiber and less sugar.  Avoid processed foods and foods with added sugars.  Meal planning  Aim to have the same number of grams of carbohydrates at each meal and for each snack time.  Plan to have regular, balanced meals and snacks.  Where to find more information  American Diabetes Association: diabetes.org  Centers for Disease Control and Prevention: cdc.gov  Academy of Nutrition and Dietetics: eatright.org  Association of Diabetes Care & Education Specialists: diabeteseducator.org  Summary  Carbohydrate counting is a method of keeping track of how many carbohydrates you eat.  Eating carbohydrates increases the amount of sugar (glucose) in your blood.  Counting how many carbohydrates you eat improves how well you manage your blood glucose. This helps you manage your diabetes.  A dietitian can help you make a meal plan and calculate how many carbohydrates you should have at each meal and snack.  This information is not intended to replace advice given to you by your health  care provider. Make sure you discuss any questions you have with your health care provider.  Document Revised: 2021 Document Reviewed: 2021  [employer]vier Patient Education ©  [employer]  Medicare Wellness  Personal Prevention Plan of Service     Date of Office Visit:    Encounter Provider:  Lane Puri MD  Place of Service:  [facility]  Patient Name: Tony Max  :  1950    As part of the Medicare Wellness portion of your visit today, we are providing you with this personalized preventive plan of services (PPPS). This plan is based upon recommendations of the [place] Preventive Services Task Force (USPSTF) and the Advisory Committee on Immunization Practices (ACIP).    This lists the preventive care services that should be considered, and provides dates of when you are due. Items listed as completed are up-to-date and do not require any further intervention.    Health Maintenance   Topic Date Due    TDAP/TD VACCINES (1 - Tdap) Never done    ZOSTER VACCINE (2 of 3) 04/15/2013    LUNG CANCER SCREENING  2021    INFLUENZA VACCINE  2024    COVID-19 Vaccine ( - -25 season) 2024    RSV Vaccine - Adults (1 - 1-dose 75+ series) Never done    BMI FOLLOWUP  2025    LIPID PANEL  01/10/2026    ANNUAL WELLNESS VISIT  2026    HEPATITIS C SCREENING  Completed    Pneumococcal Vaccine 65+  Completed    AAA SCREEN ONCE  Completed    COLORECTAL CANCER SCREENING  Discontinued       No orders of the defined types were placed in this encounter.      No follow-ups on file.      For more information:    Quit Now [place]  1-800-QUIT-NOW  https://[email or web address].quitlogix.org/en-US/  Steps to Quit Smoking  Smoking tobacco can be harmful to your health and can affect almost every organ in your body. Smoking puts you, and those around you, at risk for developing many serious chronic diseases. Quitting smoking is difficult, but it is one of the best  things that you can do for your health. It is never too late to quit.  What are the benefits of quitting smoking?  When you quit smoking, you lower your risk of developing serious diseases and conditions, such as:  Lung cancer or lung disease, such as COPD.  Heart disease.  Stroke.  Heart attack.  Infertility.  Osteoporosis and bone fractures.  Additionally, symptoms such as coughing, wheezing, and shortness of breath may get better when you quit. You may also find that you get sick less often because your body is stronger at fighting off colds and infections. If you are pregnant, quitting smoking can help to reduce your chances of having a baby of low birth weight.  How do I get ready to quit?  When you decide to quit smoking, create a plan to make sure that you are successful. Before you quit:  Pick a date to quit. Set a date within the next two weeks to give you time to prepare.  Write down the reasons why you are quitting. Keep this list in places where you will see it often, such as on your bathroom mirror or in your car or wallet.  Identify the people, places, things, and activities that make you want to smoke (triggers) and avoid them. Make sure to take these actions:  Throw away all cigarettes at home, at work, and in your car.  Throw away smoking accessories, such as ashtrays and lighters.  Clean your car and make sure to empty the ashtray.  Clean your home, including curtains and carpets.  Tell your family, friends, and coworkers that you are quitting. Support from your loved ones can make quitting easier.  Talk with your health care provider about your options for quitting smoking.  Find out what treatment options are covered by your health insurance.  What strategies can I use to quit smoking?  Talk with your healthcare provider about different strategies to quit smoking. Some strategies include:  Quitting smoking altogether instead of gradually lessening how much you smoke over a period of time.  Research shows that quitting “cold turkey” is more successful than gradually quitting.  Attending in-person counseling to help you build problem-solving skills. You are more likely to have success in quitting if you attend several counseling sessions. Even short sessions of 10 minutes can be effective.  Finding resources and support systems that can help you to quit smoking and remain smoke-free after you quit. These resources are most helpful when you use them often. They can include:  Online chats with a counselor.  Telephone quitlines.  Printed self-help materials.  Support groups or group counseling.  Text messaging programs.  Mobile phone applications.  Taking medicines to help you quit smoking. (If you are pregnant or breastfeeding, talk with your health care provider first.) Some medicines contain nicotine and some do not. Both types of medicines help with cravings, but the medicines that include nicotine help to relieve withdrawal symptoms. Your health care provider may recommend:  Nicotine patches, gum, or lozenges.  Nicotine inhalers or sprays.  Non-nicotine medicine that is taken by mouth.  Talk with your health care provider about combining strategies, such as taking medicines while you are also receiving in-person counseling. Using these two strategies together makes you more likely to succeed in quitting than if you used either strategy on its own.  If you are pregnant or breastfeeding, talk with your health care provider about finding counseling or other support strategies to quit smoking. Do not take medicine to help you quit smoking unless told to do so by your health care provider.  What things can I do to make it easier to quit?  Quitting smoking might feel overwhelming at first, but there is a lot that you can do to make it easier. Take these important actions:  Reach out to your family and friends and ask that they support and encourage you during this time. Call telephone quitlines, reach out  to support groups, or work with a counselor for support.  Ask people who smoke to avoid smoking around you.  Avoid places that trigger you to smoke, such as bars, parties, or smoke-break areas at work.  Spend time around people who do not smoke.  Lessen stress in your life, because stress can be a smoking trigger for some people. To lessen stress, try:  Exercising regularly.  Deep-breathing exercises.  Yoga.  Meditating.  Performing a body scan. This involves closing your eyes, scanning your body from head to toe, and noticing which parts of your body are particularly tense. Purposefully relax the muscles in those areas.  Download or purchase mobile phone or tablet apps (applications) that can help you stick to your quit plan by providing reminders, tips, and encouragement. There are many free apps, such as QuitGuide from the CDC (Centers for Disease Control and Prevention). You can find other support for quitting smoking (smoking cessation) through smokefree.gov and other websites.  How will I feel when I quit smoking?  Within the first 24 hours of quitting smoking, you may start to feel some withdrawal symptoms. These symptoms are usually most noticeable 2-3 days after quitting, but they usually do not last beyond 2-3 weeks. Changes or symptoms that you might experience include:  Mood swings.  Restlessness, anxiety, or irritation.  Difficulty concentrating.  Dizziness.  Strong cravings for sugary foods in addition to nicotine.  Mild weight gain.  Constipation.  Nausea.  Coughing or a sore throat.  Changes in how your medicines work in your body.  A depressed mood.  Difficulty sleeping (insomnia).  After the first 2-3 weeks of quitting, you may start to notice more positive results, such as:  Improved sense of smell and taste.  Decreased coughing and sore throat.  Slower heart rate.  Lower blood pressure.  Clearer skin.  The ability to breathe more easily.  Fewer sick days.  Quitting smoking is very challenging for  most people. Do not get discouraged if you are not successful the first time. Some people need to make many attempts to quit before they achieve long-term success. Do your best to stick to your quit plan, and talk with your health care provider if you have any questions or concerns.  This information is not intended to replace advice given to you by your health care provider. Make sure you discuss any questions you have with your health care provider.  Document Released: 12/12/2002 Document Revised: 08/15/2017 Document Reviewed: 05/03/2016  Elsevier Interactive Patient Education © 2017 Elsevier Inc.

## 2025-01-16 NOTE — ASSESSMENT & PLAN NOTE
Thyroid has shown interval improvement.  Continue same dose of medication.  Check labs at yearly.  Orders:    levothyroxine (SYNTHROID, LEVOTHROID) 150 MCG tablet; Take 1 tablet by mouth Daily.    TSH+Free T4; Future

## 2025-02-21 ENCOUNTER — OFFICE VISIT (OUTPATIENT)
Dept: SURGERY | Facility: CLINIC | Age: 75
End: 2025-02-21
Payer: MEDICARE

## 2025-02-21 VITALS
HEART RATE: 89 BPM | BODY MASS INDEX: 38.63 KG/M2 | DIASTOLIC BLOOD PRESSURE: 92 MMHG | OXYGEN SATURATION: 97 % | WEIGHT: 301 LBS | HEIGHT: 74 IN | SYSTOLIC BLOOD PRESSURE: 140 MMHG

## 2025-02-21 DIAGNOSIS — Z85.048 HISTORY OF ANAL CANCER: Primary | ICD-10-CM

## 2025-02-21 RX ORDER — ATORVASTATIN CALCIUM 10 MG/1
10 TABLET, FILM COATED ORAL
COMMUNITY
End: 2025-02-21

## 2025-02-21 NOTE — PROGRESS NOTES
"Tony Max is a 74 y.o. male in for follow up of History of anal cancer    History of Present Illness  The patient is a 74-year-old male status post anal mass excision that was anal squamous.    He underwent a CT scan on Monday, as ordered by his urologist, Dr. Olivas, at [facility]. The purpose of the scan was to monitor a small lesion on his kidney and to assess his prostate health. He has a history of nephrolithiasis, which occurred several years ago. Additionally, he has a known diagnosis of benign prostatic hyperplasia (BPH), for which he underwent GreenLight laser therapy to reduce the size of the prostate. He was informed that the prostate may regrow post-procedure.       /92 (BP Location: Left arm, Patient Position: Sitting, Cuff Size: Adult)   Pulse 89   Ht 188 cm (74\")   Wt (!) 137 kg (301 lb)   SpO2 97%   BMI 38.65 kg/m²   Body mass index is 38.65 kg/m².      PE:   Physical Exam    Physical Exam  Constitutional:       General: He is not in acute distress.     Appearance: He is well-developed.   HENT:      Head: Normocephalic and atraumatic.   Abdominal:      General: There is no distension.      Palpations: Abdomen is soft.   Genitourinary:     Comments: On perianal exam, there is a scar anteriorly but no evidence of disease. Digital rectal exam reveals no masses and good tone. Anoscopy shows no evidence of disease.  Musculoskeletal:         General: Normal range of motion.   Neurological:      Mental Status: He is alert.   Psychiatric:         Thought Content: Thought content normal.             Assessment & Plan  1. Postoperative status following anal mass excision.  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 On perianal exam, there is a scar anteriorly but no evidence of disease. Digital rectal exam reveals no masses and good tone. Anoscopy shows no evidence of disease.    Follow-up  The patient will follow up in 3 months.       1. History of anal cancer     "       Patient or patient representative verbalized consent for the use of Ambient Listening during the visit with  Dionna Rees MD for chart documentation. 2/26/2025  14:36 EST

## 2025-03-05 ENCOUNTER — TRANSCRIBE ORDERS (OUTPATIENT)
Dept: ADMINISTRATIVE | Facility: HOSPITAL | Age: 75
End: 2025-03-05
Payer: MEDICARE

## 2025-03-05 DIAGNOSIS — R97.20 ELEVATED PSA: Primary | ICD-10-CM

## 2025-03-17 ENCOUNTER — HOSPITAL ENCOUNTER (OUTPATIENT)
Facility: HOSPITAL | Age: 75
Discharge: HOME | End: 2025-03-17
Admitting: UROLOGY
Payer: MEDICARE

## 2025-03-17 DIAGNOSIS — R97.20 ELEVATED PSA: ICD-10-CM

## 2025-03-17 PROCEDURE — A9577 INJ MULTIHANCE: HCPCS | Performed by: UROLOGY

## 2025-03-17 PROCEDURE — 25510000002 GADOBENATE DIMEGLUMINE 529 MG/ML SOLUTION: Performed by: UROLOGY

## 2025-03-17 PROCEDURE — 72197 MRI PELVIS W/O & W/DYE: CPT

## 2025-03-17 RX ADMIN — GADOBENATE DIMEGLUMINE 20 ML: 529 INJECTION, SOLUTION INTRAVENOUS at 15:14

## 2025-06-05 ENCOUNTER — INFUSION (OUTPATIENT)
Dept: ONCOLOGY | Facility: HOSPITAL | Age: 75
End: 2025-06-05
Payer: MEDICARE

## 2025-06-05 DIAGNOSIS — I25.10 CORONARY ARTERY DISEASE INVOLVING NATIVE CORONARY ARTERY OF NATIVE HEART WITHOUT ANGINA PECTORIS: Primary | ICD-10-CM

## 2025-06-05 DIAGNOSIS — E78.49 OTHER HYPERLIPIDEMIA: ICD-10-CM

## 2025-06-05 DIAGNOSIS — Z78.9 STATIN INTOLERANCE: ICD-10-CM

## 2025-06-05 DIAGNOSIS — I25.2 HISTORY OF MI (MYOCARDIAL INFARCTION): ICD-10-CM

## 2025-06-05 PROCEDURE — 96372 THER/PROPH/DIAG INJ SC/IM: CPT

## 2025-06-05 PROCEDURE — 25010000002 INCLISIRAN SODIUM 284 MG/1.5ML SOLUTION PREFILLED SYRINGE: Performed by: FAMILY MEDICINE

## 2025-06-05 RX ADMIN — INCLISIRAN 284 MG: 284 INJECTION, SOLUTION SUBCUTANEOUS at 11:22

## 2025-06-11 ENCOUNTER — OFFICE VISIT (OUTPATIENT)
Dept: FAMILY MEDICINE CLINIC | Facility: CLINIC | Age: 75
End: 2025-06-11
Payer: MEDICARE

## 2025-06-11 VITALS
HEIGHT: 74 IN | OXYGEN SATURATION: 98 % | BODY MASS INDEX: 38.46 KG/M2 | SYSTOLIC BLOOD PRESSURE: 130 MMHG | TEMPERATURE: 98.4 F | HEART RATE: 77 BPM | WEIGHT: 299.7 LBS | DIASTOLIC BLOOD PRESSURE: 98 MMHG

## 2025-06-11 DIAGNOSIS — Z87.891 PERSONAL HISTORY OF NICOTINE DEPENDENCE: ICD-10-CM

## 2025-06-11 DIAGNOSIS — R29.898 LEG WEAKNESS, BILATERAL: Primary | ICD-10-CM

## 2025-06-11 DIAGNOSIS — Z87.891 PERSONAL HISTORY OF TOBACCO USE, PRESENTING HAZARDS TO HEALTH: ICD-10-CM

## 2025-06-11 DIAGNOSIS — Z12.2 ENCOUNTER FOR SCREENING FOR LUNG CANCER: ICD-10-CM

## 2025-06-11 DIAGNOSIS — R53.81 DEBILITY: ICD-10-CM

## 2025-06-11 NOTE — PROGRESS NOTES
"Chief Complaint  Extremity Weakness (-Would like to get a referral)    Subjective        HPI      The patient presents for evaluation of leg weakness, prostate enlargement, and lung cancer screening.    He reports persistent leg weakness without pain, difficulty rising from a seated position without using his arms, compromised balance on uneven surfaces, and occasional foot shuffling and tripping. He has a history of Osgood-Schlatter disease since childhood and leads a sedentary lifestyle. Upon waking, he must stabilize his legs before moving.    Approximately 5 to 6 years ago, he underwent GreenLight laser surgery for prostate reduction. A recent MRI showed mild prostate enlargement without malignancy. His urologist recommended arterial embolization to reduce blood flow to the prostate, causing him concern.    He is overdue for lung cancer screening, having not had one in over a year. He has significantly reduced smoking but has not quit.    SOCIAL HISTORY  Current smoker, has cut back significantly but not quit.           Vital Signs:   Vitals:    06/11/25 1541   BP: 130/98   Pulse: 77   Temp: 98.4 °F (36.9 °C)   TempSrc: Temporal   SpO2: 98%   Weight: 136 kg (299 lb 11.2 oz)   Height: 188 cm (74.02\")   PainSc: 0-No pain            6/11/2025     3:42 PM   PHQ-2/PHQ-9 Depression Screening   Little interest or pleasure in doing things Not at all   Feeling down, depressed, or hopeless Not at all   How difficult have these problems made it for you to do your work, take care of things at home, or get along with other people? Not difficult at all                 Physical Exam     General Appearance: Normal appearance, No acute distress.  Vital signs:      Extremities: no pretibial edema bilaterally.  Skin:   Neurological: Patella and Achilles reflexes 2+. Evidence of leg weakness when rising from a chair without assistance.  Psychiatric: normal affect, pleasant, cooperative with exam.               Results  - MRI of " prostate:    - Mild enlargement    - No signs of cancer                Assessment and Plan      1. Debility.  Exhibits leg weakness, particularly when rising from a seated position. Reflexes are normal (patella 2+ and Achilles 2+). Likely due to muscle atrophy from decreased physical activity. Referred to physical therapy for an exercise regimen to strengthen leg muscles. Advised to incorporate resistance and weight training.    2. Prostate enlargement.  Had GreenLight surgery 5-6 years ago. Recent MRI indicates mild prostate enlargement without cancer. Urologist recommended arterial embolization. Advised to seek a second opinion.    3. Lung cancer screening.  Overdue for follow-up lung cancer screening. Continues to smoke at a reduced rate. Ordered CT scan. Recommendations to quit smoking shared in after-visit summary.    PROCEDURE  Underwent GreenLight laser surgery for prostate reduction 5 to 6 years ago.         Leg weakness, bilateral  Referral to physical therapy.  Orders:    Ambulatory Referral to Physical Therapy for Evaluation & Treatment    Debility  See above  Orders:    Ambulatory Referral to Physical Therapy for Evaluation & Treatment    Encounter for screening for lung cancer  Patient is overdue for follow-up lung cancer screening.  The CT scan has been ordered.  Orders:     CT Chest Low Dose Cancer Screening WO; Future       Return if symptoms worsen or fail to improve.     Patient was given instructions and counseling regarding his condition or for health maintenance advice. Please see specific information pulled into the AVS if appropriate.     Patient or patient representative verbalized consent for the use of Ambient Listening during the visit with  Lane Puri MD for chart documentation. 6/11/2025  15:59 EDT

## 2025-06-11 NOTE — ASSESSMENT & PLAN NOTE
Referral to physical therapy.  Orders:    Ambulatory Referral to Physical Therapy for Evaluation & Treatment

## 2025-06-26 ENCOUNTER — HOSPITAL ENCOUNTER (OUTPATIENT)
Dept: CT IMAGING | Facility: HOSPITAL | Age: 75
Discharge: HOME | End: 2025-06-26
Admitting: FAMILY MEDICINE
Payer: MEDICARE

## 2025-06-26 DIAGNOSIS — Z87.891 PERSONAL HISTORY OF NICOTINE DEPENDENCE: ICD-10-CM

## 2025-06-26 DIAGNOSIS — Z12.2 ENCOUNTER FOR SCREENING FOR LUNG CANCER: ICD-10-CM

## 2025-06-26 PROCEDURE — 71271 CT THORAX LUNG CANCER SCR C-: CPT

## 2025-07-01 ENCOUNTER — TREATMENT (OUTPATIENT)
Dept: PHYSICAL THERAPY | Facility: CLINIC | Age: 75
End: 2025-07-01
Payer: MEDICARE

## 2025-07-01 DIAGNOSIS — R53.81 DEBILITY: ICD-10-CM

## 2025-07-01 DIAGNOSIS — R29.898 LEG WEAKNESS, BILATERAL: Primary | ICD-10-CM

## 2025-07-01 DIAGNOSIS — R26.89 DECREASED FUNCTIONAL MOBILITY: ICD-10-CM

## 2025-07-01 NOTE — PROGRESS NOTES
"    Physical Therapy Initial Evaluation and Plan of Care  [facility]  [place] - [address], [address]     [place] [address]   Phone: [phone]   Fax: [phone]    Patient: Tony Max   : 1950  Diagnosis/ICD-10 Code:  Leg weakness, bilateral [R29.898]  Referring practitioner: Lane Puri MD  Date of Initial Visit: 2025  Today's Date: 2025  Patient seen for 1 session         Visit Diagnoses:    ICD-10-CM ICD-9-CM   1. Leg weakness, bilateral  R29.898 729.89   2. Debility  R53.81 799.3   3. Decreased functional mobility  R26.89 781.99         Subjective Questionnaire: LEFS: 47/80      Subjective Evaluation    History of Present Illness  Mechanism of injury: Patient is a 75 y/o male who presents to physical therapy with the diagnosis of bilateral lower extremity weakness. \"My leg's just feel weak. It's been going on for quite a while, but I just ignored it.\" He denies any specific onset or trauma, but reports his legs have gradually worsened to the point it's become difficult to walk, especially on uneven terrain, navigating stairs, and being able to participate in golf. He struggles with walking up/down hills and with navigating curbs. He lives in a multi level home with one step to enter, and handrails on the steps to his basement, and reports mild difficulty with navigating stairs. 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 He denies any specific pain at the moment. He denies any falls, and currently does not use anything to help him ambulate. He lives with his spouse. \"I want to get my legs stronger, so I can play golf.\"      Patient Occupation: retired - [employer]  Current pain ratin    Social Support  Lives in: multiple-level home         Pertinent PMH: [place], [place] in , venus insufficiency, basal cell carcinoma, hypertension, bladder cancer, BPPV, CAD, cervical radiculopathy, CKD, depression, anxiety, hyperlipidemia, " hypothyroidism, chronic LBP    Objective          Postural Observations    Additional Postural Observation Details  Patient sits/stands with reduced lumbar lordosis and mild decrease in trunk extension. Discoloration/brawniness noted of bilateral calves.     Passive Range of Motion     Additional Passive Range of Motion Details  Mild limitations into bilateral hip ROM, with greatest deficits into bilateral hip IR and extensions.     Hamstrings - moderately limited bilaterally    Knee ROM - WFL bilaterally    Strength/Myotome Testing     Left Hip   Planes of Motion   Flexion: 4+  Extension: 4+  Abduction: 4+  Adduction: 4+    Right Hip   Planes of Motion   Flexion: 4+  Extension: 4+  Abduction: 4+  Adduction: 4+    Left Knee   Flexion: 4+  Extension: 4+    Right Knee   Flexion: 4+  Extension: 4+    Left Ankle/Foot   Dorsiflexion: 4+  Plantar flexion: 4+    Right Ankle/Foot   Dorsiflexion: 4+  Plantar flexion: 4+    Ambulation     Ambulation: Stairs   Ascend stairs: independent  Pattern: non-reciprocal  Railings: two rails  Descend stairs: independent  Pattern: non-reciprocal  Railings: two rails    Comments   Patient ambulates on level surface with decreased lumbopelvic rotation, and widened base of support with mid foot contact, and externally rotated lower extremities.       Balance:   30 second sit to stand: 7 reps without UE support  6 minute walk test: 792 feet without assistive device    Assessment & Plan       Assessment  Impairments: abnormal coordination, abnormal gait, activity intolerance, impaired balance, impaired physical strength, lacks appropriate home exercise program and safety issue   Functional limitations: walking, standing, stooping and reaching overhead   Assessment details: Patient is a 73 y/o male who presents to physical therapy with diagnosis of debility with lower extremity weakness. He reports no falls in the past year, but is concerned of a future fall. Patient currently ambulates  without the need for an assistive device. Patient demonstrates decreased muscle power of bilateral lower extremities, decreased ROM and mobility of bilateral LEs, is a moderate fall risk according to multiple fall screens, and demonstrates decreased strength/endurance of lower extremities according to 30 second sit to stand, and 6 MWT. Due to deficits, patient exhibits difficulty with ambulating on even and uneven surface, negotiating curbs/stairs, navigating the community, and has difficulty with transitional movements. Secondary to limitations, patient has difficulty participating in typical ADLs, such as house chores, and expresses difficulty participating in community events such as grocery shopping, and spending time with family and friends. Patient is a good candidate to initiate outpatient physical therapy to improve LE strength, improve functional mobility, and decrease risk for future falls.     Prognosis: good    Goals  Plan Goals: SHORT TERM GOALS:   4 weeks  1. Pt will be compliant with HEP.  2.  Pt to perform 8 x STS in 30 sec to demonstrate improve safety with positional changes.  3. Pt will be able to ambulate for 850 ft without rest break due to improved endurance and strength.  4. Pt will demonstrate 5/5 B LE strength in order to improve gait, transfers and stair climbing ability.      LONG TERM GOALS:  10 weeks  1.Pt will be able to ambulate for 1000 ft or greater with improve step length and heel strike in order to decrease fall risk.  2. Pt will report being able to navigate uneven terrain such as curbs and hills without fear of falling in order to safely participate in golf.  3. Pt will be able to perform 10 sit-stand without use of UE due to improved strength.  4. Pt will be independent with progress HEP, demonstrating readiness for d/c from skilled PT.      Plan  Therapy options: will be seen for skilled therapy services  Planned modality interventions: cryotherapy and thermotherapy  (hydrocollator packs)  Planned therapy interventions: abdominal trunk stabilization, ADL retraining, body mechanics training, balance/weight-bearing training, flexibility, functional ROM exercises, gait training, home exercise program, neuromuscular re-education, motor coordination training, postural training, soft tissue mobilization, strengthening, stretching, therapeutic activities, transfer training and IADL retraining  Frequency: 2x week  Duration in weeks: 8  Treatment plan discussed with: patient        History # of Personal Factors and/or Comorbidities: MODERATE (1-2)  Examination of Body System(s): # of elements: MODERATE (3)  Clinical Presentation: EVOLVING  Clinical Decision Making: MODERATE      Timed:         Manual Therapy:         mins  89563;     Therapeutic Exercise:    20     mins  31156;     Neuromuscular Nam:        mins  43952;    Therapeutic Activity:    10     mins  68458;     Gait Training:           mins  82945;     Ultrasound:          mins  55464;    Ionto                                   mins   14928  Self Care                            mins   50713      Un-Timed:  Electrical Stimulation:         mins  78680 ( );  Dry Needling          mins self-pay  Traction          mins 96492  Low Eval          Mins  47347  Mod Eval     30     Mins  22862  High Eval                            Mins  38660  Canalith Repos         mins 94753      Timed Treatment:   30   mins   Total Treatment:     60   mins          PT: Lee Rodrigues PT     License Number: [place] [id number]# [id number]. [place] LIC# [id number]  Electronically signed by Lee Rodrigues PT, 07/01/25, 2:39 PM EDT    Certification Period: 7/1/2025 thru 9/28/2025  I certify that the therapy services are furnished while this patient is under my care.  The services outlined above are required by this patient, and will be reviewed every 90 days.         Physician Signature:__________________________________________________    PHYSICIAN: Jaxson  MD Lane  NPI: [id number]                                      DATE:      Please sign and return via fax to [phone]. Thank you, [facility].

## 2025-07-08 ENCOUNTER — TREATMENT (OUTPATIENT)
Dept: PHYSICAL THERAPY | Facility: CLINIC | Age: 75
End: 2025-07-08
Payer: MEDICARE

## 2025-07-08 DIAGNOSIS — R26.89 DECREASED FUNCTIONAL MOBILITY: ICD-10-CM

## 2025-07-08 DIAGNOSIS — R53.81 DEBILITY: ICD-10-CM

## 2025-07-08 DIAGNOSIS — R29.898 LEG WEAKNESS, BILATERAL: Primary | ICD-10-CM

## 2025-07-08 NOTE — PROGRESS NOTES
Physical Therapy Daily Treatment Note    [facility] [place]  [address], [address]  [place] [id number]    Visit # 2        Patient: Tony Max   : 1950  Referring practitioner: Lane Puri MD  Date of Initial Evaluation:  Type: THERAPY  Noted: 2025  Today's Date: 2025           ICD-10-CM ICD-9-CM   1. Leg weakness, bilateral  R29.898 729.89   2. Debility  R53.81 799.3   3. Decreased functional mobility  R26.89 781.99       Subjective  Tony Max reports:   Feeling some soreness in the groin/thigh area on his (R) LE, feels probably from starting some of the exercises.    Objective   See Exercise, Manual, and Modality Logs for complete treatment       Pt Education:  HEP review  Exercise rationale/ pain free exercise performance  Anatomy and structure of affected musculature  Posture/Postural awareness  Alternate exercise positions  Verbal/Tactile cues to ensure correct exercise performance/technique      Assessment/Plan  Tolerated continued progression of therapeutic exercise/therapeutic activity/neuromuscular re-ed well today, no increased pain reported during or after session.      Did require some review and cueing of previously issued HEP for proper performance. Pt demonstrates improved performance of HEP after review and practice.     Would continue to benefit from skilled PT progressing with ROM / functional UE strengthening, with progression toward functional WB as tolerated.     Progress per Plan of Care and Progress strengthening /stabilization /functional activity             Timed:         Manual Therapy:         mins  56294     Therapeutic Exercise:     20    mins  07615     Neuromuscular Nam:    10    mins  29219    Therapeutic Activity:          mins  62688     Gait Training:           mins  25346     Ultrasound:          mins  28324    Ionto                                   mins  97006  Self Care                            mins   58490    Un-Timed:  Electrical Stimulation:         mins 59225 ( )  Traction          mins 78590    Timed Treatment:   30   mins   Total Treatment:     45   mins       MADIE Kaur License #[id number]  Physical Therapist Assistant

## 2025-07-14 ENCOUNTER — OFFICE VISIT (OUTPATIENT)
Dept: SURGERY | Facility: CLINIC | Age: 75
End: 2025-07-14
Payer: MEDICARE

## 2025-07-14 VITALS
HEART RATE: 60 BPM | BODY MASS INDEX: 38.63 KG/M2 | DIASTOLIC BLOOD PRESSURE: 90 MMHG | OXYGEN SATURATION: 98 % | HEIGHT: 74 IN | SYSTOLIC BLOOD PRESSURE: 132 MMHG | WEIGHT: 301 LBS

## 2025-07-14 DIAGNOSIS — Z85.048 HISTORY OF ANAL CANCER: Primary | ICD-10-CM

## 2025-07-15 ENCOUNTER — TREATMENT (OUTPATIENT)
Dept: PHYSICAL THERAPY | Facility: CLINIC | Age: 75
End: 2025-07-15
Payer: MEDICARE

## 2025-07-15 DIAGNOSIS — R53.81 DEBILITY: ICD-10-CM

## 2025-07-15 DIAGNOSIS — R26.89 DECREASED FUNCTIONAL MOBILITY: ICD-10-CM

## 2025-07-15 DIAGNOSIS — R29.898 LEG WEAKNESS, BILATERAL: Primary | ICD-10-CM

## 2025-07-15 NOTE — PROGRESS NOTES
Physical Therapy Daily Treatment Note    [facility] Physical Therapy [place]  [address], [address]  [place] [address]    Visit # 3        Patient: Tony Max   : 1950  Referring practitioner: Lane Puri MD  Date of Initial Evaluation:  Type: THERAPY  Noted: 2025  Today's Date: 7/15/2025           ICD-10-CM ICD-9-CM   1. Leg weakness, bilateral  R29.898 729.89   2. Debility  R53.81 799.3   3. Decreased functional mobility  R26.89 781.99         Subjective  Tony Max reports:   Noticing some improvements overall, less soreness in the groin/thigh area of his (R) LE.    Objective   See Exercise, Manual, and Modality Logs for complete treatment     Pt Education:  HEP review  Exercise rationale/ pain free exercise performance  Anatomy and structure of affected musculature  Posture/Postural awareness  Alternate exercise positions    Assessment/Plan  Tolerated continued progression of therapeutic exercise/therapeutic activity/neuromuscular re-ed well today, improved tolerance to rocker board activities and improved tolerance to and performance of SLR exercise today.     Would continue to benefit from skilled PT progressing with ROM / functional UE strengthening, with progression toward functional WB as tolerated.     Progress per Plan of Care and Progress strengthening /stabilization /functional activity             Timed:         Manual Therapy:         mins  53997     Therapeutic Exercise:     20    mins  96237     Neuromuscular Nam:    10    mins  82424    Therapeutic Activity:          mins  82728     Gait Training:           mins  03410     Ultrasound:          mins  01044    Ionto                                   mins  53297  Self Care                            mins  74874    Un-Timed:  Electrical Stimulation:         mins 22472 ( )  Traction          mins 44493    Timed Treatment:   30   mins   Total Treatment:     40   mins       MADIE Kaur  License #[id number]  Physical Therapist Assistant

## 2025-07-17 ENCOUNTER — TREATMENT (OUTPATIENT)
Dept: PHYSICAL THERAPY | Facility: CLINIC | Age: 75
End: 2025-07-17
Payer: MEDICARE

## 2025-07-17 DIAGNOSIS — R29.898 LEG WEAKNESS, BILATERAL: Primary | ICD-10-CM

## 2025-07-17 DIAGNOSIS — R53.81 DEBILITY: ICD-10-CM

## 2025-07-17 DIAGNOSIS — R26.89 DECREASED FUNCTIONAL MOBILITY: ICD-10-CM

## 2025-07-17 NOTE — PROGRESS NOTES
Physical Therapy Daily Treatment Note    [facility] Physical Therapy [place]  [address], [address]  [place] [address]    Visit # 4        Patient: Tony Max   : 1950  Referring practitioner: Lane Puri MD  Date of Initial Evaluation:  Type: THERAPY  Noted: 2025  Today's Date: 2025           ICD-10-CM ICD-9-CM   1. Leg weakness, bilateral  R29.898 729.89   2. Debility  R53.81 799.3   3. Decreased functional mobility  R26.89 781.99         Subjective  Tony Max reports:   Noticing some improvements overall, less soreness in the groin/thigh area of his (R) LE.    Objective   See Exercise, Manual, and Modality Logs for complete treatment     Pt Education:  HEP review  Exercise rationale/ pain free exercise performance  Anatomy and structure of affected musculature  Posture/Postural awareness  Alternate exercise positions    Assessment/Plan  Tolerated continued progression of therapeutic exercise/therapeutic activity/neuromuscular re-ed well today, demos continued improved tolerance to rocker board activities with less HHA needed during, and good tolerance to and performance of SLR exercise with addition of 1# cuff weight today.     Would continue to benefit from skilled PT progressing with ROM /  LE strengthening, with continued progression into functional WB as tolerated.     Progress per Plan of Care and Progress strengthening /stabilization /functional activity             Timed:         Manual Therapy:         mins  81639     Therapeutic Exercise:     21    mins  41888     Neuromuscular Nam:    15    mins  28167    Therapeutic Activity:      9    mins  30612     Gait Training:           mins  18268     Ultrasound:          mins  72779    Ionto                                   mins  56249  Self Care                            mins  07788    Un-Timed:  Electrical Stimulation:         mins 24788 ( )  Traction          mins 08740    Timed Treatment:   45    mins   Total Treatment:     55   mins       Armin Pruitt, MADIE  [place] License #[id number]  Physical Therapist Assistant

## 2025-07-22 ENCOUNTER — TREATMENT (OUTPATIENT)
Dept: PHYSICAL THERAPY | Facility: CLINIC | Age: 75
End: 2025-07-22
Payer: MEDICARE

## 2025-07-22 DIAGNOSIS — R53.81 DEBILITY: ICD-10-CM

## 2025-07-22 DIAGNOSIS — R29.898 LEG WEAKNESS, BILATERAL: Primary | ICD-10-CM

## 2025-07-22 DIAGNOSIS — R26.89 DECREASED FUNCTIONAL MOBILITY: ICD-10-CM

## 2025-07-22 PROCEDURE — 97110 THERAPEUTIC EXERCISES: CPT

## 2025-07-22 PROCEDURE — 97112 NEUROMUSCULAR REEDUCATION: CPT

## 2025-07-22 NOTE — PROGRESS NOTES
"Physical Therapy Daily Treatment Note  [facility] Physical Therapy  [place] - [address], [address]     [place] [address]   Phone: [phone]   Fax: [phone]      Patient: Tony Max   : 1950  Referring practitioner: Lane Puri MD  Date of Initial Visit: Type: THERAPY  Noted: 2025  Today's Date: 2025  Patient seen for 5 sessions       Visit Diagnoses:    ICD-10-CM ICD-9-CM   1. Leg weakness, bilateral  R29.898 729.89   2. Debility  R53.81 799.3   3. Decreased functional mobility  R26.89 781.99         Subjective:  Tony Max reports: having a \"bit of a mistep [place] yesterday.\" Reports hyperextending the knee, and feels like he \"tweaked his hamstring.\" Denies falling, but is feeling sore through the R hamstring. No evidence of swelling or bruising upon observation.       Objective   See Exercise, Manual, and Modality Logs for complete treatment.       Assessment:  Patient tolerates today's movement interventions well without adverse effects. Held several exercises due to complaints of R hamstring pain. Continued to focus exercises into lower extremity strengthening and balance training on dynamic surfaces. Continues to be a moderate fall risk. Would continue to benefit from skilled PT to address remaining deficits in balance, lower extremity strength, and lower extremity mobility training.       Plan:   Would continue to benefit from skilled PT progressing with ROM /  LE strengthening, with continued progression into functional WB as tolerated.       Timed:         Manual Therapy:         mins  66193;     Therapeutic Exercise:    18     mins  53591;     Neuromuscular Nam:    10    mins  37740;    Therapeutic Activity:     4     mins  54173;     Gait Training:           mins  57254;     Ultrasound:          mins  87163;    Ionto                                   mins  68786  Self Care                            mins  61872  Traction          mins " 90536      Un-Timed:  Canalith Repos         mins 15220  Electrical Stimulation:         mins  24031 ( );  Dry Needling          mins self-pay  Traction          mins 14751        Timed Treatment:   32   mins   Total Treatment:     48   mins    Lee Rodrigues PT  License Number: [place] [id number]# [id number]. [place] LIC# [id number]    Physical Therapist

## 2025-07-24 NOTE — PROGRESS NOTES
"Tony Max is a 74 y.o. male in for follow up of History of anal cancer    History of Present Illness  The patient is a 74-year-old male who had anal cancer and underwent an excision. There was a T2 excised, but no phototherapy was administered.    He has been engaging in physical activities such as fishing and golfing, typically starting around 9:30 or 10:00 AM. He ensures to stay hydrated by consuming Gatorade, water, and occasionally beer. His last colonoscopy was performed a couple of years ago, during which a few polyps were removed. He was advised to return for a follow-up in 3 years.       /90 (BP Location: Left arm, Patient Position: Sitting, Cuff Size: Large Adult)   Pulse 60   Ht 188 cm (74\")   Wt (!) 137 kg (301 lb)   SpO2 98%   BMI 38.65 kg/m²   Body mass index is 38.65 kg/m².      PE:   Physical Exam    Physical Exam  Constitutional:       General: He is not in acute distress.     Appearance: He is well-developed.   HENT:      Head: Normocephalic and atraumatic.   Abdominal:      General: There is no distension.      Palpations: Abdomen is soft.   Genitourinary:     Comments: Perianal exam shows no external evidence of disease. Digital rectal exam reveals good tone. Anoscopy performed with no internal evidence of disease. Internal hemorrhoids are grade 1 x 2.  Musculoskeletal:         General: Normal range of motion.   Neurological:      Mental Status: He is alert.   Psychiatric:         Thought Content: Thought content normal.             Assessment & Plan  1. History of anal cancer.  He had an excision of a T2 tumor and no phototherapy. The perianal exam showed no evidence of disease externally. A digital rectal exam revealed good tone, and anoscopy showed no evidence of disease with grade 1 internal hemorrhoids x2. He is advised to maintain adequate hydration, especially during hot weather, to prevent constipation and other complications. Follow-up exams will continue every 3 months for " the first 2 years, then switch to every 6 months thereafter.     1. History of anal cancer           Patient or patient representative verbalized consent for the use of Ambient Listening during the visit with  Dionna Rees MD for chart documentation. 7/24/2025  12:07 EDT

## 2025-07-29 ENCOUNTER — TREATMENT (OUTPATIENT)
Dept: PHYSICAL THERAPY | Facility: CLINIC | Age: 75
End: 2025-07-29
Payer: MEDICARE

## 2025-07-29 DIAGNOSIS — R29.898 LEG WEAKNESS, BILATERAL: Primary | ICD-10-CM

## 2025-07-29 DIAGNOSIS — R53.81 DEBILITY: ICD-10-CM

## 2025-07-29 DIAGNOSIS — R26.89 DECREASED FUNCTIONAL MOBILITY: ICD-10-CM

## 2025-07-29 PROCEDURE — 97112 NEUROMUSCULAR REEDUCATION: CPT

## 2025-07-29 PROCEDURE — 97530 THERAPEUTIC ACTIVITIES: CPT

## 2025-07-29 NOTE — PROGRESS NOTES
Physical Therapy Daily Treatment Note  [facility] Physical Therapy  [place] - [address], [address]     [place] [id number]   Phone: [phone]   Fax: [phone]      Patient: Tony Max   : 1950  Referring practitioner: Lane Puri MD  Date of Initial Visit: Type: THERAPY  Noted: 2025  Today's Date: 2025  Patient seen for 6 sessions       Visit Diagnoses:    ICD-10-CM ICD-9-CM   1. Leg weakness, bilateral  R29.898 729.89   2. Debility  R53.81 799.3   3. Decreased functional mobility  R26.89 781.99         Subjective:  Tony Max reports: generally feeling improving. Right hamstring is feeling improved. Having some difficulty with navigating hills on the golf course, especially downhill. No pain reported at the start of today's treatment session.       Objective   See Exercise, Manual, and Modality Logs for complete treatment.       Assessment:  Patient tolerates today's movement interventions well without adverse effects. Continued to progress balance interventions with the inclusion of cable walk outs fwd/backwards to mimic managing hills. Demonstrates improving ankle strategy balance reactions. Increased resistance with table exercises with no pain reported, but does report fatigue. Good response to today's treatment session.       Plan:   Would continue to benefit from skilled PT progressing with ROM /  LE strengthening, with continued progression into functional WB as tolerated.       Timed:         Manual Therapy:         mins  06884;     Therapeutic Exercise:    4     mins  00602;     Neuromuscular Nam:    18    mins  41955;    Therapeutic Activity:     10     mins  61122;     Gait Training:           mins  95506;     Ultrasound:          mins  50063;    Ionto                                   mins  14122  Self Care                            mins  82382  Traction          mins 29482      Un-Timed:  Canalith Repos         mins 22816  Electrical  Stimulation:         mins  12482 ( );  Dry Needling          mins self-pay  Traction          mins 44707        Timed Treatment:   32   mins   Total Treatment:     48   mins    Lee Rodrigues PT  License Number: [id number]# [id number]. [place] LIC# [id number]    Physical Therapist

## 2025-07-31 ENCOUNTER — TREATMENT (OUTPATIENT)
Dept: PHYSICAL THERAPY | Facility: CLINIC | Age: 75
End: 2025-07-31
Payer: MEDICARE

## 2025-07-31 DIAGNOSIS — R26.89 DECREASED FUNCTIONAL MOBILITY: ICD-10-CM

## 2025-07-31 DIAGNOSIS — R53.81 DEBILITY: ICD-10-CM

## 2025-07-31 DIAGNOSIS — R29.898 LEG WEAKNESS, BILATERAL: Primary | ICD-10-CM

## 2025-07-31 NOTE — PROGRESS NOTES
"Physical Therapy Daily Treatment Note  [facility] Physical Therapy  [place] - [address], [address]     [place] [address]   Phone: [phone]   Fax: [phone]      Patient: Tony Max   : 1950  Referring practitioner: Lane Puri MD  Date of Initial Visit: Type: THERAPY  Noted: 2025  Today's Date: 2025  Patient seen for 7 sessions       Visit Diagnoses:    ICD-10-CM ICD-9-CM   1. Leg weakness, bilateral  R29.898 729.89   2. Debility  R53.81 799.3   3. Decreased functional mobility  R26.89 781.99         Subjective:  Tony Max reports: no falls since the start of PT. \"This feels like it has been helping.\" No complaint of pain at the start of physical therapy.       Objective   See Exercise, Manual, and Modality Logs for complete treatment.       Assessment:  Patient tolerates today's movement interventions well without adverse effects. Demonstrates improving standing balance with lessened about of CGA required with standing balance drills. Continues to be fatigued with therex to bilateral lower extremities, but able to progress to 2# with side-lying hip abd. He continues to progress well with physical therapy services.       Plan:   Would continue to benefit from skilled PT progressing with ROM /  LE strengthening, with continued progression into functional WB as tolerated.         Timed:         Manual Therapy:         mins  45025;     Therapeutic Exercise:    14     mins  19583;     Neuromuscular Nam:    2    mins  36057;    Therapeutic Activity:     14     mins  67923;     Gait Training:           mins  84673;     Ultrasound:          mins  54275;    Ionto                                   mins  17556  Self Care                            mins  75383  Traction          mins 35391      Un-Timed:  Canalith Repos         mins 54807  Electrical Stimulation:         mins  64952 ( );  Dry Needling          mins self-pay  Traction          mins " 98684        Timed Treatment:   30   mins   Total Treatment:     52   mins    Lee Rodrigues PT  License Number: [place] [id number]# [id number]. [place] LIC# [id number]    Physical Therapist

## 2025-08-05 ENCOUNTER — TREATMENT (OUTPATIENT)
Dept: PHYSICAL THERAPY | Facility: CLINIC | Age: 75
End: 2025-08-05
Payer: MEDICARE

## 2025-08-05 DIAGNOSIS — R26.89 DECREASED FUNCTIONAL MOBILITY: ICD-10-CM

## 2025-08-05 DIAGNOSIS — R29.898 LEG WEAKNESS, BILATERAL: Primary | ICD-10-CM

## 2025-08-05 DIAGNOSIS — R53.81 DEBILITY: ICD-10-CM

## 2025-08-05 PROCEDURE — 97530 THERAPEUTIC ACTIVITIES: CPT

## 2025-08-05 PROCEDURE — 97110 THERAPEUTIC EXERCISES: CPT

## (undated) DEVICE — TUBING, SUCTION, 1/4" X 10', STRAIGHT: Brand: MEDLINE

## (undated) DEVICE — THE SINGLE USE ETRAP – POLYP TRAP IS USED FOR SUCTION RETRIEVAL OF ENDOSCOPICALLY REMOVED POLYPS.: Brand: ETRAP

## (undated) DEVICE — SENSR O2 OXIMAX FNGR A/ 18IN NONSTR

## (undated) DEVICE — GOWN,SIRUS,NON REINFRCD,LARGE,SET IN SL: Brand: MEDLINE

## (undated) DEVICE — ADAPT CLN BIOGUARD AIR/H2O DISP

## (undated) DEVICE — SPNG LAP 18X18IN LF STRL PK/5

## (undated) DEVICE — PREP SOL POVIDONE/IODINE BT 4OZ

## (undated) DEVICE — SINGLE-USE BIOPSY FORCEPS: Brand: RADIAL JAW 4

## (undated) DEVICE — GOWN SURG AERO CHROME XL

## (undated) DEVICE — DRAPE,UTILITY,TAPE,15X26,STERILE: Brand: MEDLINE

## (undated) DEVICE — PENCL ES MEGADINE EZ/CLEAN BUTN W/HOLSTR 10FT

## (undated) DEVICE — CANN O2 ETCO2 FITS ALL CONN CO2 SMPL A/ 7IN DISP LF

## (undated) DEVICE — LN SMPL CO2 SHTRM SD STREAM W/M LUER

## (undated) DEVICE — HYPODERMIC SAFETY NEEDLE: Brand: MONOJECT

## (undated) DEVICE — PREMIUM WET SKIN PREP TRAY: Brand: MEDLINE INDUSTRIES, INC.

## (undated) DEVICE — LOU MINOR PROCEDURE: Brand: MEDLINE INDUSTRIES, INC.

## (undated) DEVICE — SNAR POLYP CAPTIVATOR RND STFF 2.4 240CM 10MM 1P/U

## (undated) DEVICE — GLV SURG SENSICARE PI LF PF 7.5 GRN STRL

## (undated) DEVICE — KT ORCA ORCAPOD DISP STRL

## (undated) DEVICE — ANTIBACTERIAL UNDYED BRAIDED (POLYGLACTIN 910), SYNTHETIC ABSORBABLE SUTURE: Brand: COATED VICRYL

## (undated) DEVICE — STERILE LATEX POWDER-FREE SURGICAL GLOVESWITH NITRILE COATING: Brand: PROTEXIS

## (undated) DEVICE — PATIENT RETURN ELECTRODE, SINGLE-USE, CONTACT QUALITY MONITORING, ADULT, WITH 9FT CORD, FOR PATIENTS WEIGING OVER 33LBS. (15KG): Brand: MEGADYNE

## (undated) DEVICE — SNAR POLYP SENSATION STDOVL 27 240 BX40

## (undated) DEVICE — SMOKE EVACUATION TUBING WITH 8 IN INTEGRAL WAND AND SPONGE GUARD: Brand: BUFFALO FILTER